# Patient Record
Sex: FEMALE | Race: WHITE | NOT HISPANIC OR LATINO | Employment: FULL TIME | URBAN - METROPOLITAN AREA
[De-identification: names, ages, dates, MRNs, and addresses within clinical notes are randomized per-mention and may not be internally consistent; named-entity substitution may affect disease eponyms.]

---

## 2017-04-06 ENCOUNTER — HOSPITAL ENCOUNTER (OUTPATIENT)
Dept: LAB | Facility: MEDICAL CENTER | Age: 42
End: 2017-04-06
Attending: OBSTETRICS & GYNECOLOGY
Payer: COMMERCIAL

## 2017-04-06 LAB
25(OH)D3 SERPL-MCNC: 16 NG/ML (ref 30–100)
ALBUMIN SERPL BCP-MCNC: 4.5 G/DL (ref 3.2–4.9)
ALBUMIN/GLOB SERPL: 1.4 G/DL
ALP SERPL-CCNC: 82 U/L (ref 30–99)
ALT SERPL-CCNC: 22 U/L (ref 2–50)
ANION GAP SERPL CALC-SCNC: 5 MMOL/L (ref 0–11.9)
AST SERPL-CCNC: 21 U/L (ref 12–45)
BASOPHILS # BLD AUTO: 0.8 % (ref 0–1.8)
BASOPHILS # BLD: 0.05 K/UL (ref 0–0.12)
BILIRUB SERPL-MCNC: 0.4 MG/DL (ref 0.1–1.5)
BUN SERPL-MCNC: 24 MG/DL (ref 8–22)
CALCIUM SERPL-MCNC: 9.7 MG/DL (ref 8.5–10.5)
CHLORIDE SERPL-SCNC: 107 MMOL/L (ref 96–112)
CHOLEST SERPL-MCNC: 227 MG/DL (ref 100–199)
CO2 SERPL-SCNC: 27 MMOL/L (ref 20–33)
CREAT SERPL-MCNC: 0.9 MG/DL (ref 0.5–1.4)
EOSINOPHIL # BLD AUTO: 0.23 K/UL (ref 0–0.51)
EOSINOPHIL NFR BLD: 3.6 % (ref 0–6.9)
ERYTHROCYTE [DISTWIDTH] IN BLOOD BY AUTOMATED COUNT: 43.7 FL (ref 35.9–50)
ESTRADIOL SERPL-MCNC: 23 PG/ML
FSH SERPL-ACNC: 35.5 MIU/ML
GFR SERPL CREATININE-BSD FRML MDRD: >60 ML/MIN/1.73 M 2
GLOBULIN SER CALC-MCNC: 3.3 G/DL (ref 1.9–3.5)
GLUCOSE SERPL-MCNC: 100 MG/DL (ref 65–99)
HCT VFR BLD AUTO: 47.2 % (ref 37–47)
HDLC SERPL-MCNC: 57 MG/DL
HGB BLD-MCNC: 15.1 G/DL (ref 12–16)
IMM GRANULOCYTES # BLD AUTO: 0.03 K/UL (ref 0–0.11)
IMM GRANULOCYTES NFR BLD AUTO: 0.5 % (ref 0–0.9)
LDLC SERPL CALC-MCNC: 148 MG/DL
LYMPHOCYTES # BLD AUTO: 2.08 K/UL (ref 1–4.8)
LYMPHOCYTES NFR BLD: 32.8 % (ref 22–41)
MCH RBC QN AUTO: 30.6 PG (ref 27–33)
MCHC RBC AUTO-ENTMCNC: 32 G/DL (ref 33.6–35)
MCV RBC AUTO: 95.5 FL (ref 81.4–97.8)
MONOCYTES # BLD AUTO: 0.53 K/UL (ref 0–0.85)
MONOCYTES NFR BLD AUTO: 8.3 % (ref 0–13.4)
NEUTROPHILS # BLD AUTO: 3.43 K/UL (ref 2–7.15)
NEUTROPHILS NFR BLD: 54 % (ref 44–72)
NRBC # BLD AUTO: 0 K/UL
NRBC BLD AUTO-RTO: 0 /100 WBC
PLATELET # BLD AUTO: 256 K/UL (ref 164–446)
PMV BLD AUTO: 12.1 FL (ref 9–12.9)
POTASSIUM SERPL-SCNC: 4.4 MMOL/L (ref 3.6–5.5)
PROT SERPL-MCNC: 7.8 G/DL (ref 6–8.2)
RBC # BLD AUTO: 4.94 M/UL (ref 4.2–5.4)
SODIUM SERPL-SCNC: 139 MMOL/L (ref 135–145)
T4 FREE SERPL-MCNC: 0.73 NG/DL (ref 0.53–1.43)
TRIGL SERPL-MCNC: 111 MG/DL (ref 0–149)
TSH SERPL DL<=0.005 MIU/L-ACNC: 1.61 UIU/ML (ref 0.3–3.7)
WBC # BLD AUTO: 6.4 K/UL (ref 4.8–10.8)

## 2017-04-06 PROCEDURE — 36415 COLL VENOUS BLD VENIPUNCTURE: CPT

## 2017-04-06 PROCEDURE — 82306 VITAMIN D 25 HYDROXY: CPT

## 2017-04-06 PROCEDURE — 83001 ASSAY OF GONADOTROPIN (FSH): CPT

## 2017-04-06 PROCEDURE — 80061 LIPID PANEL: CPT

## 2017-04-06 PROCEDURE — 84443 ASSAY THYROID STIM HORMONE: CPT

## 2017-04-06 PROCEDURE — 82670 ASSAY OF TOTAL ESTRADIOL: CPT

## 2017-04-06 PROCEDURE — 84439 ASSAY OF FREE THYROXINE: CPT

## 2017-04-06 PROCEDURE — 80053 COMPREHEN METABOLIC PANEL: CPT

## 2017-04-06 PROCEDURE — 85025 COMPLETE CBC W/AUTO DIFF WBC: CPT

## 2017-04-20 DIAGNOSIS — Z01.812 PRE-PROCEDURAL LABORATORY EXAMINATION: ICD-10-CM

## 2017-04-20 LAB
APPEARANCE UR: CLEAR
BILIRUB UR QL STRIP.AUTO: NEGATIVE
COLOR UR: YELLOW
CULTURE IF INDICATED INDCX: NO UA CULTURE
GLUCOSE UR STRIP.AUTO-MCNC: NEGATIVE MG/DL
HCG SERPL QL: NEGATIVE
KETONES UR STRIP.AUTO-MCNC: NEGATIVE MG/DL
LEUKOCYTE ESTERASE UR QL STRIP.AUTO: NEGATIVE
MICRO URNS: NORMAL
NITRITE UR QL STRIP.AUTO: NEGATIVE
PH UR STRIP.AUTO: 5.5 [PH]
PROT UR QL STRIP: NEGATIVE MG/DL
RBC UR QL AUTO: NEGATIVE
SP GR UR STRIP.AUTO: 1.02

## 2017-04-20 PROCEDURE — 36415 COLL VENOUS BLD VENIPUNCTURE: CPT

## 2017-04-20 PROCEDURE — 84703 CHORIONIC GONADOTROPIN ASSAY: CPT

## 2017-04-20 PROCEDURE — 81003 URINALYSIS AUTO W/O SCOPE: CPT

## 2017-04-20 RX ORDER — ACETAMINOPHEN 500 MG
500-1000 TABLET ORAL EVERY 6 HOURS PRN
Status: ON HOLD | COMMUNITY
End: 2017-04-27

## 2017-04-27 ENCOUNTER — HOSPITAL ENCOUNTER (OUTPATIENT)
Facility: MEDICAL CENTER | Age: 42
End: 2017-04-27
Attending: OBSTETRICS & GYNECOLOGY | Admitting: OBSTETRICS & GYNECOLOGY
Payer: COMMERCIAL

## 2017-04-27 VITALS
RESPIRATION RATE: 16 BRPM | SYSTOLIC BLOOD PRESSURE: 138 MMHG | DIASTOLIC BLOOD PRESSURE: 76 MMHG | BODY MASS INDEX: 37.84 KG/M2 | TEMPERATURE: 98.3 F | HEART RATE: 49 BPM | HEIGHT: 66 IN | OXYGEN SATURATION: 93 % | WEIGHT: 235.45 LBS

## 2017-04-27 PROBLEM — N93.8 DYSFUNCTIONAL UTERINE BLEEDING: Status: ACTIVE | Noted: 2017-04-27

## 2017-04-27 PROBLEM — N93.8 DYSFUNCTIONAL UTERINE BLEEDING: Status: RESOLVED | Noted: 2017-04-27 | Resolved: 2017-04-27

## 2017-04-27 LAB — HCG SERPL QL: NEGATIVE

## 2017-04-27 PROCEDURE — 110371 HCHG SHELL REV 272: Performed by: OBSTETRICS & GYNECOLOGY

## 2017-04-27 PROCEDURE — 160029 HCHG SURGERY MINUTES - 1ST 30 MINS LEVEL 4: Performed by: OBSTETRICS & GYNECOLOGY

## 2017-04-27 PROCEDURE — 700102 HCHG RX REV CODE 250 W/ 637 OVERRIDE(OP)

## 2017-04-27 PROCEDURE — A9270 NON-COVERED ITEM OR SERVICE: HCPCS

## 2017-04-27 PROCEDURE — 160009 HCHG ANES TIME/MIN: Performed by: OBSTETRICS & GYNECOLOGY

## 2017-04-27 PROCEDURE — 84703 CHORIONIC GONADOTROPIN ASSAY: CPT

## 2017-04-27 PROCEDURE — A4606 OXYGEN PROBE USED W OXIMETER: HCPCS | Performed by: OBSTETRICS & GYNECOLOGY

## 2017-04-27 PROCEDURE — 160036 HCHG PACU - EA ADDL 30 MINS PHASE I: Performed by: OBSTETRICS & GYNECOLOGY

## 2017-04-27 PROCEDURE — 700111 HCHG RX REV CODE 636 W/ 250 OVERRIDE (IP)

## 2017-04-27 PROCEDURE — 160048 HCHG OR STATISTICAL LEVEL 1-5: Performed by: OBSTETRICS & GYNECOLOGY

## 2017-04-27 PROCEDURE — 160041 HCHG SURGERY MINUTES - EA ADDL 1 MIN LEVEL 4: Performed by: OBSTETRICS & GYNECOLOGY

## 2017-04-27 PROCEDURE — 160002 HCHG RECOVERY MINUTES (STAT): Performed by: OBSTETRICS & GYNECOLOGY

## 2017-04-27 PROCEDURE — 700101 HCHG RX REV CODE 250

## 2017-04-27 PROCEDURE — 110382 HCHG SHELL REV 271: Performed by: OBSTETRICS & GYNECOLOGY

## 2017-04-27 PROCEDURE — 160035 HCHG PACU - 1ST 60 MINS PHASE I: Performed by: OBSTETRICS & GYNECOLOGY

## 2017-04-27 PROCEDURE — 502560 HCHG KIT, NOVASURE ENDOMETRIAL: Performed by: OBSTETRICS & GYNECOLOGY

## 2017-04-27 PROCEDURE — 502587 HCHG PACK, D&C: Performed by: OBSTETRICS & GYNECOLOGY

## 2017-04-27 PROCEDURE — 502240 HCHG MISC OR SUPPLY RC 0272: Performed by: OBSTETRICS & GYNECOLOGY

## 2017-04-27 RX ORDER — SODIUM CHLORIDE, SODIUM LACTATE, POTASSIUM CHLORIDE, CALCIUM CHLORIDE 600; 310; 30; 20 MG/100ML; MG/100ML; MG/100ML; MG/100ML
INJECTION, SOLUTION INTRAVENOUS ONCE
Status: COMPLETED | OUTPATIENT
Start: 2017-04-27 | End: 2017-04-27

## 2017-04-27 RX ORDER — MIDAZOLAM HYDROCHLORIDE 1 MG/ML
INJECTION INTRAMUSCULAR; INTRAVENOUS
Status: DISCONTINUED
Start: 2017-04-27 | End: 2017-04-27 | Stop reason: HOSPADM

## 2017-04-27 RX ORDER — OXYCODONE HCL 5 MG/5 ML
SOLUTION, ORAL ORAL
Status: COMPLETED
Start: 2017-04-27 | End: 2017-04-27

## 2017-04-27 RX ORDER — OXYCODONE HYDROCHLORIDE 5 MG/1
2.5 TABLET ORAL
Status: DISCONTINUED | OUTPATIENT
Start: 2017-04-27 | End: 2017-04-27 | Stop reason: HOSPADM

## 2017-04-27 RX ORDER — KETOROLAC TROMETHAMINE 30 MG/ML
30 INJECTION, SOLUTION INTRAMUSCULAR; INTRAVENOUS EVERY 6 HOURS
Status: DISCONTINUED | OUTPATIENT
Start: 2017-04-27 | End: 2017-04-27 | Stop reason: HOSPADM

## 2017-04-27 RX ORDER — NORETHINDRONE ACETATE AND ETHINYL ESTRADIOL, AND FERROUS FUMARATE 1MG-20(24)
1 KIT ORAL DAILY
COMMUNITY
End: 2020-01-20

## 2017-04-27 RX ORDER — BUPIVACAINE HYDROCHLORIDE AND EPINEPHRINE 2.5; 5 MG/ML; UG/ML
INJECTION, SOLUTION INFILTRATION; PERINEURAL
Status: DISCONTINUED | OUTPATIENT
Start: 2017-04-27 | End: 2017-04-27 | Stop reason: HOSPADM

## 2017-04-27 RX ORDER — MEPERIDINE HYDROCHLORIDE 25 MG/ML
INJECTION INTRAMUSCULAR; INTRAVENOUS; SUBCUTANEOUS
Status: COMPLETED
Start: 2017-04-27 | End: 2017-04-27

## 2017-04-27 RX ORDER — ACETAMINOPHEN 500 MG
1000 TABLET ORAL EVERY 6 HOURS
Status: DISCONTINUED | OUTPATIENT
Start: 2017-04-27 | End: 2017-04-27 | Stop reason: HOSPADM

## 2017-04-27 RX ORDER — ONDANSETRON 2 MG/ML
4 INJECTION INTRAMUSCULAR; INTRAVENOUS EVERY 6 HOURS PRN
Status: DISCONTINUED | OUTPATIENT
Start: 2017-04-27 | End: 2017-04-27 | Stop reason: HOSPADM

## 2017-04-27 RX ORDER — OXYCODONE HYDROCHLORIDE 5 MG/1
5 TABLET ORAL
Qty: 15 TAB | Refills: 0 | Status: SHIPPED | OUTPATIENT
Start: 2017-04-27 | End: 2019-07-09

## 2017-04-27 RX ADMIN — FENTANYL CITRATE 25 MCG: 50 INJECTION, SOLUTION INTRAMUSCULAR; INTRAVENOUS at 11:21

## 2017-04-27 RX ADMIN — FENTANYL CITRATE 25 MCG: 50 INJECTION, SOLUTION INTRAMUSCULAR; INTRAVENOUS at 11:37

## 2017-04-27 RX ADMIN — MEPERIDINE HYDROCHLORIDE 12.5 MG: 25 INJECTION INTRAMUSCULAR; INTRAVENOUS; SUBCUTANEOUS at 10:55

## 2017-04-27 RX ADMIN — OXYCODONE HYDROCHLORIDE 10 MG: 5 SOLUTION ORAL at 11:10

## 2017-04-27 RX ADMIN — SODIUM CHLORIDE, SODIUM LACTATE, POTASSIUM CHLORIDE, CALCIUM CHLORIDE: 600; 310; 30; 20 INJECTION, SOLUTION INTRAVENOUS at 08:45

## 2017-04-27 ASSESSMENT — PAIN SCALES - GENERAL
PAINLEVEL_OUTOF10: 5
PAINLEVEL_OUTOF10: 4
PAINLEVEL_OUTOF10: 6
PAINLEVEL_OUTOF10: 4

## 2017-04-27 NOTE — DISCHARGE INSTRUCTIONS
ACTIVITY: Rest and take it easy for the first 24 hours.  A responsible adult is recommended to remain with you during that time.  It is normal to feel sleepy.  We encourage you to not do anything that requires balance, judgment or coordination.    MILD FLU-LIKE SYMPTOMS ARE NORMAL. YOU MAY EXPERIENCE GENERALIZED MUSCLE ACHES, THROAT IRRITATION, HEADACHE AND/OR SOME NAUSEA.    FOR 24 HOURS DO NOT:  Drive, operate machinery or run household appliances.  Drink beer or alcoholic beverages.   Make important decisions or sign legal documents.    SPECIAL INSTRUCTIONS: See Attached Instruction Sheet Regarding Hysteroscopy    F/up for a wound check in 2 weeks    Pelvic rest and no heavy lifting more than 20 lb for 6 weeks    Call for heavy bleeding or evidence of wound infection       DIET: To avoid nausea, slowly advance diet as tolerated, avoiding spicy or greasy foods for the first day.  Add more substantial food to your diet according to your physician's instructions.  Babies can be fed formula or breast milk as soon as they are hungry.  INCREASE FLUIDS AND FIBER TO AVOID CONSTIPATION.    SURGICAL DRESSING/BATHING: May shower tomorrow, no hot tubs, baths or swimming until approved by you physician     FOLLOW-UP APPOINTMENT:  A follow-up appointment should be arranged with your doctor, call to schedule.    You should CALL YOUR PHYSICIAN if you develop:  Fever greater than 101 degrees F.  Pain not relieved by medication, or persistent nausea or vomiting.  Excessive bleeding (blood soaking through dressing) or unexpected drainage from the wound.  Extreme redness or swelling around the incision site, drainage of pus or foul smelling drainage.  Inability to urinate or empty your bladder within 8 hours.  Problems with breathing or chest pain.    You should call 911 if you develop problems with breathing or chest pain.  If you are unable to contact your doctor or surgical center, you should go to the nearest emergency room or  urgent care center.  Physician's telephone #: Dr. Webb [phone]    If any questions arise, call your doctor.  If your doctor is not available, please feel free to call the Surgical Center at [phone].  The Center is open Monday through Friday from 7AM to 7PM.  You can also call the HEALTH HOTLINE open 24 hours/day, 7 days/week and speak to a nurse at [phone], or toll free at [phone].    A registered nurse may call you a few days after your surgery to see how you are doing after your procedure.    MEDICATIONS: Resume taking daily medication.  Take prescribed pain medication with food.  If no medication is prescribed, you may take non-aspirin pain medication if needed.  PAIN MEDICATION CAN BE VERY CONSTIPATING.  Take a stool softener or laxative such as senokot, pericolace, or milk of magnesia if needed.    Prescription given for Roxicodone.  Last pain medication given at 11:10 AM, next dose due at 3:10 PM.    If your physician has prescribed pain medication that includes Acetaminophen (Tylenol), do not take additional Acetaminophen (Tylenol) while taking the prescribed medication.    Depression / Suicide Risk    As you are discharged from this [facility] facility, it is important to learn how to keep safe from harming yourself.    Recognize the warning signs:  · Abrupt changes in personality, positive or negative- including increase in energy   · Giving away possessions  · Change in eating patterns- significant weight changes-  positive or negative  · Change in sleeping patterns- unable to sleep or sleeping all the time   · Unwillingness or inability to communicate  · Depression  · Unusual sadness, discouragement and loneliness  · Talk of wanting to die  · Neglect of personal appearance   · Rebelliousness- reckless behavior  · Withdrawal from people/activities they love  · Confusion- inability to concentrate     If you or a loved one observes any of these behaviors or has concerns about  self-harm, here's what you can do:  · Talk about it- your feelings and reasons for harming yourself  · Remove any means that you might use to hurt yourself (examples: pills, rope, extension cords, firearm)  · Get professional help from the community (Mental Health, Substance Abuse, psychological counseling)  · Do not be alone:Call your Safe Contact- someone whom you trust who will be there for you.  · Call your local CRISIS HOTLINE 428-7575 or 432-677-8345  · Call your local Children's Mobile Crisis Response Team [place] (426) 683-7735 or www.[employer]  · Call the toll free National Suicide Prevention Hotlines   · National Suicide Prevention Lifeline 142-990-YJHO (3638)  · National Hope Line Network 800-SUICIDE (845-5602)

## 2017-04-27 NOTE — H&P
CHIEF COMPLAINT:  Dysmenorrhea and menorrhagia.    HISTORY OF PRESENT ILLNESS:  Patient is a 42-year-old parous female.  She has   had one term  section years ago, but has been seeking infertility with   Dr. Rosario for years now.  She has chosen to not pursue her infertility anymore.    She has had history of bowel resection for small bowel obstruction with   lysis of adhesions, appendectomy, multiple laparoscopic surgeries, as well as   hysteroscopies to no avail.  She decides to pursue ablation of her uterus due   to her heavy bleeding and her periods.  Plan to proceed with a hysteroscopy,   NovaSure uterine ablation today.    PAST MEDICAL HISTORY AND PAST SURGICAL HISTORY:  The patient's past medical   history is significant for the above.  She has had a history of chlamydia in    and tubo-ovarian abscess.  She also had a history of an abnormal Pap   smear , had cryotherapy.  She had a small bowel resection for small-bowel   obstruction, and exploratory laparotomy, appendectomy with lysis of adhesions,   and several laparoscopies for adhesions.  She had a Harleen-Parkinson-White   syndrome that resolved.  She had a  in .  She has also had more   recently a hysteroscopy by Dr. Rosario in .  She has had gallbladder surgery,   as well as a colonoscopy in .  Past medical history is significant for the   history of abnormal Pap smear, irregular bleeding, and history of chlamydia.    She also has reflux and GERD, and borderline hypertension.    OBSTETRICAL HISTORY:  She has had two pregnancies, 1 SAB and then one-term    section.    ALLERGIES:  SHE HAS AN ALLERGY TO LATEX AND SULFA.    CURRENT MEDICATIONS:  Prilosec, Pepcid-AC, Claritin as needed, and   multivitamin.    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    FAMILY HISTORY:  Significant for ovarian cancer in her mother.    PHYSICAL EXAMINATION:  VITAL SIGNS:  On physical examination, her  vital signs are stable.  LUNGS:  Clear.  CORONARY:  Regular rate.  ABDOMEN:  Obese, multiple well-healed incisions.  No organosplenomegaly, no   rebound, guarding, or tenderness.  Bimanual examination shows extremely well   supported uterus, nulliparous appearing cervix, parous appearing uterus,   normal, nontender adnexa.  Patient had transvaginal ultrasound at                        within the last year, which was also benign.    IMPRESSION AND PLAN:  My impression patient has dysmenorrhea and menorrhagia.    Currently, she is on Ultram low-dose birth control pill to manage her   bleeding where she was recommended to continue to remain on that until her   surgery and then she will go off.  Patient understands the risks of the   procedure, which include, but are not limited to infection, bleeding,   transfusion, transfusion-related complications, risks of intraabdominal organ   injury, injury to bowel, bladder, need for repair, risk of continued bleeding   and cramping, and need for further therapy in the future.  Also, risk of need   for more definitive therapy in the future since it is a hysterectomy and   oophorectomy.  Patient understands the risks of uterine perforation, which is   unique to this procedure and risk of injury to bowel or bladder and need for   reparative surgery, as well as potential failure of the procedure, as well as   risks of general anesthesia including death.  All her questions were answered   and informed consent was obtained.  She was administered a dose of Cytotec the   night before to help with cervical dilation.  She will remain on her birth   control pills to thin the uterine lining until her procedure then she will go   off.  She is aware that this is not a form of contraception and that she   continues to use condoms as pregnancies after an ablation do not typically do   well.       ____________________________________     MD RAUL BOLIVAR / AKANKSHA    DD:  04/27/2017  07:25:24  DT:  04/27/2017 08:05:17    D#:  [id number]  Job#:  [id number]

## 2017-04-27 NOTE — IP AVS SNAPSHOT
" Home Care Instructions                                                                                                                Name:Balbina Randall  Medical Record Number:[id number]  CSN: [id number]    YOB: 1975   Age: 42 y.o.  Sex: female  HT:1.676 m (5' 5.98\") WT: 106.8 kg (235 lb 7.2 oz)          Admit Date: 4/27/2017     Discharge Date:   Today's Date: 4/27/2017  Attending Doctor:  Lindsay Webb M.D.                  Allergies:  Latex; Other drug; and Sulfa drugs                Discharge Instructions         ACTIVITY: Rest and take it easy for the first 24 hours.  A responsible adult is recommended to remain with you during that time.  It is normal to feel sleepy.  We encourage you to not do anything that requires balance, judgment or coordination.    MILD FLU-LIKE SYMPTOMS ARE NORMAL. YOU MAY EXPERIENCE GENERALIZED MUSCLE ACHES, THROAT IRRITATION, HEADACHE AND/OR SOME NAUSEA.    FOR 24 HOURS DO NOT:  Drive, operate machinery or run household appliances.  Drink beer or alcoholic beverages.   Make important decisions or sign legal documents.    SPECIAL INSTRUCTIONS: See Attached Instruction Sheet Regarding Hysteroscopy    F/up for a wound check in 2 weeks    Pelvic rest and no heavy lifting more than 20 lb for 6 weeks    Call for heavy bleeding or evidence of wound infection       DIET: To avoid nausea, slowly advance diet as tolerated, avoiding spicy or greasy foods for the first day.  Add more substantial food to your diet according to your physician's instructions.  Babies can be fed formula or breast milk as soon as they are hungry.  INCREASE FLUIDS AND FIBER TO AVOID CONSTIPATION.    SURGICAL DRESSING/BATHING: May shower tomorrow, no hot tubs, baths or swimming until approved by you physician     FOLLOW-UP APPOINTMENT:  A follow-up appointment should be arranged with your doctor, call to schedule.    You should CALL YOUR PHYSICIAN if you develop:  Fever greater than 101 degrees F.  Pain " not relieved by medication, or persistent nausea or vomiting.  Excessive bleeding (blood soaking through dressing) or unexpected drainage from the wound.  Extreme redness or swelling around the incision site, drainage of pus or foul smelling drainage.  Inability to urinate or empty your bladder within 8 hours.  Problems with breathing or chest pain.    You should call 911 if you develop problems with breathing or chest pain.  If you are unable to contact your doctor or surgical center, you should go to the nearest emergency room or urgent care center.  Physician's telephone #: Dr. Webb [phone]    If any questions arise, call your doctor.  If your doctor is not available, please feel free to call the Surgical Center at [phone].  The Center is open Monday through Friday from 7AM to 7PM.  You can also call the [facility] HOTLINE open 24 hours/day, 7 days/week and speak to a nurse at [phone], or toll free at [phone].    A registered nurse may call you a few days after your surgery to see how you are doing after your procedure.    MEDICATIONS: Resume taking daily medication.  Take prescribed pain medication with food.  If no medication is prescribed, you may take non-aspirin pain medication if needed.  PAIN MEDICATION CAN BE VERY CONSTIPATING.  Take a stool softener or laxative such as senokot, pericolace, or milk of magnesia if needed.    Prescription given for Roxicodone.  Last pain medication given at 11:10 AM, next dose due at 3:10 PM.    If your physician has prescribed pain medication that includes Acetaminophen (Tylenol), do not take additional Acetaminophen (Tylenol) while taking the prescribed medication.    Depression / Suicide Risk    As you are discharged from this [facility] facility, it is important to learn how to keep safe from harming yourself.    Recognize the warning signs:  · Abrupt changes in personality, positive or negative- including increase in energy   · Giving away  possessions  · Change in eating patterns- significant weight changes-  positive or negative  · Change in sleeping patterns- unable to sleep or sleeping all the time   · Unwillingness or inability to communicate  · Depression  · Unusual sadness, discouragement and loneliness  · Talk of wanting to die  · Neglect of personal appearance   · Rebelliousness- reckless behavior  · Withdrawal from people/activities they love  · Confusion- inability to concentrate     If you or a loved one observes any of these behaviors or has concerns about self-harm, here's what you can do:  · Talk about it- your feelings and reasons for harming yourself  · Remove any means that you might use to hurt yourself (examples: pills, rope, extension cords, firearm)  · Get professional help from the community (Mental Health, Substance Abuse, psychological counseling)  · Do not be alone:Call your Safe Contact- someone whom you trust who will be there for you.  · Call your local CRISIS HOTLINE 871-0355 or 440-430-7166  · Call your local Children's Mobile Crisis Response Team [place] (250) 823-4027 or [employer]  · Call the toll free National Suicide Prevention Hotlines   · National Suicide Prevention Lifeline 016-299-HINJ (6021)  · National Hope Line Network 800-SUICIDE (630-4683)       Medication List      START taking these medications        Instructions    Morning Afternoon Evening Bedtime    oxycodone immediate-release 5 MG Tabs   Commonly known as:  ROXICODONE        Take 1 Tab by mouth every 3 hours as needed (Moderate Pain (NRS Pain Scale 4-6; CPOT Pain Scale 3-5)).   Dose:  5 mg                          CONTINUE taking these medications        Instructions    Morning Afternoon Evening Bedtime    omeprazole 20 MG delayed-release capsule   Commonly known as:  PRILOSEC        Take 1 Cap by mouth 2 Times a Day.   Dose:  20 mg                        TAYTULLA 1-20 MG-MCG(24) Caps   Generic drug:  Norethin Ace-Eth Estrad-FE         Take 1 Tab by mouth every day.   Dose:  1 Tab                          STOP taking these medications     acetaminophen 500 MG Tabs   Commonly known as:  TYLENOL               NON SPECIFIED                    Where to Get Your Medications      Information about where to get these medications is not yet available     ! Ask your nurse or doctor about these medications    - oxycodone immediate-release 5 MG Tabs            Medication Information     Above and/or attached are the medications your physician expects you to take upon discharge. Review all of your home medications and newly ordered medications with your doctor and/or pharmacist. Follow medication instructions as directed by your doctor and/or pharmacist. Please keep your medication list with you and share with your physician. Update the information when medications are discontinued, doses are changed, or new medications (including over-the-counter products) are added; and carry medication information at all times in the event of emergency situations.        Resources     Quit Smoking / Tobacco Use:    I understand the use of any tobacco products increases my chance of suffering from future heart disease or stroke and could cause other illnesses which may shorten my life. Quitting the use of tobacco products is the single most important thing I can do to improve my health. For further information on smoking / tobacco cessation call a Toll Free Quit Line at 1-193.613.3863 (*National Cancer [place]) or 1-894.529.3081 (American Lung Association) or you can access the web based program at www.lungusa.org.    [place] Tobacco Users Help Line:  (905) 805-2706       Toll Free: 1-453.970.7128  Quit Tobacco Program [facility] (164)699-4129    Crisis Hotline:    [place] Crisis Hotline:  2-589-HYIEZKG or 1-118.685.9657    [place] Crisis Hotline:    1-941.562.7401 or 996-788-4126    Discharge Survey:   Thank you for choosing [facility]. We hope  we did everything we could to make your hospital stay a pleasant one. You may be receiving a survey and we would appreciate your time and participation in answering the questions. Your input is very valuable to us in our efforts to improve our service to our patients and their families.            Signatures     My signature on this form indicates that:    1. I acknowledge receipt and understanding of these Home Care Instruction.  2. My questions regarding this information have been answered to my satisfaction.  3. I have formulated a plan with my discharge nurse to obtain my prescribed medications for home.    __________________________________      __________________________________                   Patient Signature                                 Guardian/Responsible Adult Signature      __________________________________                 __________       ________                       Nurse Signature                                               Date                 Time

## 2017-04-27 NOTE — IP AVS SNAPSHOT
4/27/2017    Balbina Randall  [address] [facility] [address]    Dear Balbina:    [facility] wants to ensure your discharge home is safe and you or your loved ones have had all of your questions answered regarding your care after you leave the hospital.    Below is a list of resources and contact information should you have any questions regarding your hospital stay, follow-up instructions, or active medical symptoms.    Questions or Concerns Regarding… Contact   Medical Questions Related to Your Discharge  (7 days a week, 8am-5pm) Contact a Nurse Care Coordinator   [phone]   Medical Questions Not Related to Your Discharge  (24 hours a day / 7 days a week)  Contact the Nurse Health Line   [phone]    Medications or Discharge Instructions Refer to your discharge packet   or contact your [facility] Primary Care Provider   [phone]   Follow-up Appointment(s) Schedule your appointment via [employer]   or contact Scheduling [phone]   Billing Review your statement via [employer]  or contact Billing [phone]   Medical Records Review your records via [employer]   or contact Medical Records [phone]     You may receive a telephone call within two days of discharge. This call is to make certain you understand your discharge instructions and have the opportunity to have any questions answered. You can also easily access your medical information, test results and upcoming appointments via the [employer]sense free online health management tool. You can learn more and sign up at [employer]/Healthsense. For assistance setting up your Healthsense account, please call [phone].    Once again, we want to ensure your discharge home is safe and that you have a clear understanding of any next steps in your care. If you have any questions or concerns, please do not hesitate to contact us, we are here for you. Thank you for choosing [facility] for your healthcare needs.    Sincerely,    Your [facility] Healthcare Team

## 2017-04-27 NOTE — OR NURSING
1050 Received from Prasad NICHOLAS.  Abdomen soft, no bleeding noted on traci pad.      1055 Pt is shivering see mar.     1110 Oral pain medication given see mar     1121 Iv pain medication given see mar  6/10    1122 Pt  brought to bedside.  Pt wants to fill RX on her way home.      1242 Pt voided without difficulty.  Steady gait, no c/o of n/v.      1245 Dc instructions completed with PT and her .     1254 Dc to car via ambulating.  Pt has all belongings.

## 2017-05-03 NOTE — OP REPORT
DATE OF SERVICE:  2017    PREOPERATIVE DIAGNOSES:  1.  Menorrhagia.  2.  Dysmenorrhea.  3.  History of prior  section.  4.  History of endometriosis and adhesions.  5.  History of infertility.    POSTOPERATIVE DIAGNOSES:  1.  Menorrhagia.  2.  Dysmenorrhea.  3.  History of prior  section.  4.  History of endometriosis and adhesions.  5.  History of infertility.    OPERATIONS AND PROCEDURES PERFORMED:  Hysteroscopy and NovaSure uterine   ablation.    SURGEON:  Lindsay Webb MD    ASSISTANT:  None.    ANESTHESIOLOGIST:  Himanshu Mortensen DO    COMPLICATIONS:  None.    ESTIMATED BLOOD LOSS:  Less than 10 mL.    FINDINGS:  Atrophic uterine lining, normal bilateral ostia.    PROCEDURE PERFORMED IN DETAIL:  Patient is a 42-year-old parous female with a   longstanding history of infertility due to endometriosis and pelvic   inflammatory disease resulting in scar tissue who has decided to not pursue   fertility anymore and wishes to undergo a uterine ablation due to dysmenorrhea   and menorrhagia.  Patient understands this is not a form of contraception and   plans to use condoms.  She has tried hormonal therapy, but does not tolerate   it well and gets a rash.  She also has a family history for ovarian cancer.    Plan is to proceed with a NovaSure uterine ablation and hysteroscopy.    Risks, benefits, indications, and alternatives were discussed with the patient   prior, which include, but are not limited to, infection, bleeding,   transfusion, transfusion-related complications, risk of pregnancy if she does   not use any form of contraception and the pregnancy not ending well, in   addition to risk of uterine perforation, injury to bowel, bladder, as well as   failure of the procedure and need for definitive therapy.  All questions were   answered, and informed consent was obtained.    The patient was taken to the operating room with a running IV.  General   anesthesia was administered.  She was  prepped and draped in the usual sterile   fashion.  She received 2 g of Ancef on-call to the OR.  A bivalve speculum was   placed in the vagina.  Cervix was circumferentially injected with 0.25%   Marcaine.  The uterus was sounded to about 6 cm.  The hysteroscope was then   introduced after the cervix was dilated to admit a size 7 Hegar dilator.    Survey of the patient's pelvis revealed atrophic findings ___ lining.  The   hysteroscope was then removed, and the NovaSure device was placed.  The length   was set at 6 cm with a width 4.3 cm, 88 power noted.  It lasted for about 56   seconds ___ passed the cavity inspection.  The instrument was removed.    Excellent hemostasis was noted.  Tenaculum was removed.  The patient went to   recovery room in stable condition.    PATHOLOGY:  None.       ____________________________________     MD RAUL BOLIVAR / AKANKSHA    DD:  05/02/2017 08:32:49  DT:  05/02/2017 10:50:30    D#:  [id number]  Job#:  [id number]

## 2017-06-15 ENCOUNTER — HOSPITAL ENCOUNTER (OUTPATIENT)
Dept: LAB | Facility: MEDICAL CENTER | Age: 42
End: 2017-06-15
Attending: PHYSICIAN ASSISTANT
Payer: COMMERCIAL

## 2017-06-15 LAB
ALBUMIN SERPL BCP-MCNC: 3.8 G/DL (ref 3.2–4.9)
ALBUMIN/GLOB SERPL: 1 G/DL
ALP SERPL-CCNC: 84 U/L (ref 30–99)
ALT SERPL-CCNC: 17 U/L (ref 2–50)
ANION GAP SERPL CALC-SCNC: 7 MMOL/L (ref 0–11.9)
AST SERPL-CCNC: 18 U/L (ref 12–45)
BASOPHILS # BLD AUTO: 1 % (ref 0–1.8)
BASOPHILS # BLD: 0.09 K/UL (ref 0–0.12)
BILIRUB SERPL-MCNC: 0.4 MG/DL (ref 0.1–1.5)
BUN SERPL-MCNC: 15 MG/DL (ref 8–22)
CALCIUM SERPL-MCNC: 9.3 MG/DL (ref 8.5–10.5)
CHLORIDE SERPL-SCNC: 106 MMOL/L (ref 96–112)
CO2 SERPL-SCNC: 25 MMOL/L (ref 20–33)
CREAT SERPL-MCNC: 0.82 MG/DL (ref 0.5–1.4)
EOSINOPHIL # BLD AUTO: 0.33 K/UL (ref 0–0.51)
EOSINOPHIL NFR BLD: 3.6 % (ref 0–6.9)
ERYTHROCYTE [DISTWIDTH] IN BLOOD BY AUTOMATED COUNT: 41.3 FL (ref 35.9–50)
GFR SERPL CREATININE-BSD FRML MDRD: >60 ML/MIN/1.73 M 2
GLOBULIN SER CALC-MCNC: 3.7 G/DL (ref 1.9–3.5)
GLUCOSE SERPL-MCNC: 97 MG/DL (ref 65–99)
HCT VFR BLD AUTO: 45.6 % (ref 37–47)
HGB BLD-MCNC: 14.9 G/DL (ref 12–16)
IMM GRANULOCYTES # BLD AUTO: 0.05 K/UL (ref 0–0.11)
IMM GRANULOCYTES NFR BLD AUTO: 0.5 % (ref 0–0.9)
LIPASE SERPL-CCNC: 20 U/L (ref 11–82)
LYMPHOCYTES # BLD AUTO: 2.27 K/UL (ref 1–4.8)
LYMPHOCYTES NFR BLD: 24.9 % (ref 22–41)
MCH RBC QN AUTO: 30.8 PG (ref 27–33)
MCHC RBC AUTO-ENTMCNC: 32.7 G/DL (ref 33.6–35)
MCV RBC AUTO: 94.2 FL (ref 81.4–97.8)
MONOCYTES # BLD AUTO: 0.66 K/UL (ref 0–0.85)
MONOCYTES NFR BLD AUTO: 7.2 % (ref 0–13.4)
NEUTROPHILS # BLD AUTO: 5.73 K/UL (ref 2–7.15)
NEUTROPHILS NFR BLD: 62.8 % (ref 44–72)
NRBC # BLD AUTO: 0 K/UL
NRBC BLD AUTO-RTO: 0 /100 WBC
PLATELET # BLD AUTO: 263 K/UL (ref 164–446)
PMV BLD AUTO: 12.4 FL (ref 9–12.9)
POTASSIUM SERPL-SCNC: 4.1 MMOL/L (ref 3.6–5.5)
PROT SERPL-MCNC: 7.5 G/DL (ref 6–8.2)
RBC # BLD AUTO: 4.84 M/UL (ref 4.2–5.4)
SODIUM SERPL-SCNC: 138 MMOL/L (ref 135–145)
WBC # BLD AUTO: 9.1 K/UL (ref 4.8–10.8)

## 2017-06-15 PROCEDURE — 83690 ASSAY OF LIPASE: CPT

## 2017-06-15 PROCEDURE — 36415 COLL VENOUS BLD VENIPUNCTURE: CPT

## 2017-06-15 PROCEDURE — 85025 COMPLETE CBC W/AUTO DIFF WBC: CPT

## 2017-06-15 PROCEDURE — 80053 COMPREHEN METABOLIC PANEL: CPT

## 2017-09-28 ENCOUNTER — EH NON-PROVIDER (OUTPATIENT)
Dept: OCCUPATIONAL MEDICINE | Facility: CLINIC | Age: 42
End: 2017-09-28

## 2017-09-28 DIAGNOSIS — Z29.89 NEED FOR ISOLATION: ICD-10-CM

## 2017-09-28 PROCEDURE — 94375 RESPIRATORY FLOW VOLUME LOOP: CPT

## 2018-04-18 ENCOUNTER — HOSPITAL ENCOUNTER (OUTPATIENT)
Dept: LAB | Facility: MEDICAL CENTER | Age: 43
End: 2018-04-18
Attending: PHYSICIAN ASSISTANT
Payer: COMMERCIAL

## 2018-04-18 LAB
ALBUMIN SERPL BCP-MCNC: 4.4 G/DL (ref 3.2–4.9)
ALBUMIN/GLOB SERPL: 1.4 G/DL
ALP SERPL-CCNC: 73 U/L (ref 30–99)
ALT SERPL-CCNC: 40 U/L (ref 2–50)
ANION GAP SERPL CALC-SCNC: 11 MMOL/L (ref 0–11.9)
AST SERPL-CCNC: 36 U/L (ref 12–45)
BASOPHILS # BLD AUTO: 0.9 % (ref 0–1.8)
BASOPHILS # BLD: 0.07 K/UL (ref 0–0.12)
BILIRUB SERPL-MCNC: 0.5 MG/DL (ref 0.1–1.5)
BUN SERPL-MCNC: 16 MG/DL (ref 8–22)
CALCIUM SERPL-MCNC: 9.5 MG/DL (ref 8.5–10.5)
CHLORIDE SERPL-SCNC: 101 MMOL/L (ref 96–112)
CO2 SERPL-SCNC: 26 MMOL/L (ref 20–33)
CREAT SERPL-MCNC: 0.9 MG/DL (ref 0.5–1.4)
EOSINOPHIL # BLD AUTO: 0.28 K/UL (ref 0–0.51)
EOSINOPHIL NFR BLD: 3.4 % (ref 0–6.9)
ERYTHROCYTE [DISTWIDTH] IN BLOOD BY AUTOMATED COUNT: 43.6 FL (ref 35.9–50)
GLOBULIN SER CALC-MCNC: 3.1 G/DL (ref 1.9–3.5)
GLUCOSE SERPL-MCNC: 83 MG/DL (ref 65–99)
HCT VFR BLD AUTO: 43.4 % (ref 37–47)
HGB BLD-MCNC: 14.2 G/DL (ref 12–16)
IMM GRANULOCYTES # BLD AUTO: 0.03 K/UL (ref 0–0.11)
IMM GRANULOCYTES NFR BLD AUTO: 0.4 % (ref 0–0.9)
LIPASE SERPL-CCNC: 18 U/L (ref 11–82)
LYMPHOCYTES # BLD AUTO: 2.74 K/UL (ref 1–4.8)
LYMPHOCYTES NFR BLD: 33.5 % (ref 22–41)
MCH RBC QN AUTO: 30.3 PG (ref 27–33)
MCHC RBC AUTO-ENTMCNC: 32.7 G/DL (ref 33.6–35)
MCV RBC AUTO: 92.7 FL (ref 81.4–97.8)
MONOCYTES # BLD AUTO: 0.63 K/UL (ref 0–0.85)
MONOCYTES NFR BLD AUTO: 7.7 % (ref 0–13.4)
NEUTROPHILS # BLD AUTO: 4.43 K/UL (ref 2–7.15)
NEUTROPHILS NFR BLD: 54.1 % (ref 44–72)
NRBC # BLD AUTO: 0 K/UL
NRBC BLD-RTO: 0 /100 WBC
PLATELET # BLD AUTO: 249 K/UL (ref 164–446)
PMV BLD AUTO: 12.2 FL (ref 9–12.9)
POTASSIUM SERPL-SCNC: 3.9 MMOL/L (ref 3.6–5.5)
PROT SERPL-MCNC: 7.5 G/DL (ref 6–8.2)
RBC # BLD AUTO: 4.68 M/UL (ref 4.2–5.4)
SODIUM SERPL-SCNC: 138 MMOL/L (ref 135–145)
WBC # BLD AUTO: 8.2 K/UL (ref 4.8–10.8)

## 2018-04-18 PROCEDURE — 80053 COMPREHEN METABOLIC PANEL: CPT

## 2018-04-18 PROCEDURE — 85025 COMPLETE CBC W/AUTO DIFF WBC: CPT

## 2018-04-18 PROCEDURE — 36415 COLL VENOUS BLD VENIPUNCTURE: CPT

## 2018-04-18 PROCEDURE — 83690 ASSAY OF LIPASE: CPT

## 2018-05-02 ENCOUNTER — HOSPITAL ENCOUNTER (OUTPATIENT)
Dept: RADIOLOGY | Facility: MEDICAL CENTER | Age: 43
End: 2018-05-02
Attending: PHYSICIAN ASSISTANT
Payer: COMMERCIAL

## 2018-05-02 DIAGNOSIS — R68.81 EARLY SATIETY: ICD-10-CM

## 2018-05-02 DIAGNOSIS — R14.0 ABDOMINAL DISTENTION: ICD-10-CM

## 2018-05-02 DIAGNOSIS — R10.13 ABDOMINAL PAIN, EPIGASTRIC: ICD-10-CM

## 2018-05-02 PROCEDURE — 700117 HCHG RX CONTRAST REV CODE 255: Performed by: PHYSICIAN ASSISTANT

## 2018-05-02 PROCEDURE — 74170 CT ABD WO CNTRST FLWD CNTRST: CPT

## 2018-05-02 RX ADMIN — IOHEXOL 100 ML: 350 INJECTION, SOLUTION INTRAVENOUS at 08:01

## 2018-09-13 ENCOUNTER — DOCUMENTATION (OUTPATIENT)
Dept: OCCUPATIONAL MEDICINE | Facility: CLINIC | Age: 43
End: 2018-09-13

## 2018-09-20 ENCOUNTER — EH NON-PROVIDER (OUTPATIENT)
Dept: OCCUPATIONAL MEDICINE | Facility: CLINIC | Age: 43
End: 2018-09-20
Payer: COMMERCIAL

## 2018-09-20 DIAGNOSIS — Z02.89 ENCOUNTER FOR OCCUPATIONAL HEALTH EXAMINATION INVOLVING RESPIRATOR: Primary | ICD-10-CM

## 2018-09-20 PROCEDURE — 94375 RESPIRATORY FLOW VOLUME LOOP: CPT | Performed by: PREVENTIVE MEDICINE

## 2018-09-27 ENCOUNTER — IMMUNIZATION (OUTPATIENT)
Dept: OCCUPATIONAL MEDICINE | Facility: CLINIC | Age: 43
End: 2018-09-27

## 2018-09-27 DIAGNOSIS — Z23 NEED FOR VACCINATION: ICD-10-CM

## 2018-09-27 PROCEDURE — 90686 IIV4 VACC NO PRSV 0.5 ML IM: CPT | Performed by: PREVENTIVE MEDICINE

## 2019-01-14 ENCOUNTER — HOSPITAL ENCOUNTER (OUTPATIENT)
Dept: LAB | Facility: MEDICAL CENTER | Age: 44
End: 2019-01-14
Attending: OBSTETRICS & GYNECOLOGY
Payer: COMMERCIAL

## 2019-01-14 LAB
25(OH)D3 SERPL-MCNC: 12 NG/ML (ref 30–100)
ESTRADIOL SERPL-MCNC: 145 PG/ML
FSH SERPL-ACNC: 5.9 MIU/ML
LH SERPL-ACNC: 5 IU/L

## 2019-01-14 PROCEDURE — 82306 VITAMIN D 25 HYDROXY: CPT

## 2019-01-14 PROCEDURE — 82670 ASSAY OF TOTAL ESTRADIOL: CPT

## 2019-01-14 PROCEDURE — 36415 COLL VENOUS BLD VENIPUNCTURE: CPT

## 2019-01-14 PROCEDURE — 83002 ASSAY OF GONADOTROPIN (LH): CPT

## 2019-01-14 PROCEDURE — 83001 ASSAY OF GONADOTROPIN (FSH): CPT

## 2019-02-25 ENCOUNTER — HOSPITAL ENCOUNTER (OUTPATIENT)
Dept: RADIOLOGY | Facility: MEDICAL CENTER | Age: 44
End: 2019-02-25
Attending: OBSTETRICS & GYNECOLOGY
Payer: COMMERCIAL

## 2019-02-25 DIAGNOSIS — Z12.31 VISIT FOR SCREENING MAMMOGRAM: ICD-10-CM

## 2019-04-08 ENCOUNTER — HOSPITAL ENCOUNTER (OUTPATIENT)
Dept: RADIOLOGY | Facility: MEDICAL CENTER | Age: 44
End: 2019-04-08
Attending: OBSTETRICS & GYNECOLOGY
Payer: COMMERCIAL

## 2019-04-08 DIAGNOSIS — Z12.39 SCREENING BREAST EXAMINATION: ICD-10-CM

## 2019-04-08 DIAGNOSIS — N60.19 FIBROCYSTIC BREAST CHANGES, UNSPECIFIED LATERALITY: ICD-10-CM

## 2019-04-08 DIAGNOSIS — N64.4 BREAST PAIN: ICD-10-CM

## 2019-04-08 PROCEDURE — G0279 TOMOSYNTHESIS, MAMMO: HCPCS

## 2019-04-08 PROCEDURE — 76642 ULTRASOUND BREAST LIMITED: CPT | Mod: RT

## 2019-06-24 ENCOUNTER — HOSPITAL ENCOUNTER (OUTPATIENT)
Dept: LAB | Facility: MEDICAL CENTER | Age: 44
End: 2019-06-24
Attending: FAMILY MEDICINE
Payer: COMMERCIAL

## 2019-06-24 LAB
25(OH)D3 SERPL-MCNC: 18 NG/ML (ref 30–100)
ALBUMIN SERPL BCP-MCNC: 4.4 G/DL (ref 3.2–4.9)
ALBUMIN/GLOB SERPL: 1.4 G/DL
ALP SERPL-CCNC: 77 U/L (ref 30–99)
ALT SERPL-CCNC: 23 U/L (ref 2–50)
ANION GAP SERPL CALC-SCNC: 5 MMOL/L (ref 0–11.9)
AST SERPL-CCNC: 23 U/L (ref 12–45)
BASOPHILS # BLD AUTO: 0.7 % (ref 0–1.8)
BASOPHILS # BLD: 0.05 K/UL (ref 0–0.12)
BILIRUB SERPL-MCNC: 0.5 MG/DL (ref 0.1–1.5)
BUN SERPL-MCNC: 20 MG/DL (ref 8–22)
CALCIUM SERPL-MCNC: 9.6 MG/DL (ref 8.5–10.5)
CHLORIDE SERPL-SCNC: 105 MMOL/L (ref 96–112)
CHOLEST SERPL-MCNC: 236 MG/DL (ref 100–199)
CO2 SERPL-SCNC: 26 MMOL/L (ref 20–33)
CREAT SERPL-MCNC: 0.82 MG/DL (ref 0.5–1.4)
EOSINOPHIL # BLD AUTO: 0.33 K/UL (ref 0–0.51)
EOSINOPHIL NFR BLD: 4.7 % (ref 0–6.9)
ERYTHROCYTE [DISTWIDTH] IN BLOOD BY AUTOMATED COUNT: 42.2 FL (ref 35.9–50)
GLOBULIN SER CALC-MCNC: 3.2 G/DL (ref 1.9–3.5)
GLUCOSE SERPL-MCNC: 98 MG/DL (ref 65–99)
HCT VFR BLD AUTO: 46.1 % (ref 37–47)
HDLC SERPL-MCNC: 55 MG/DL
HGB BLD-MCNC: 14.8 G/DL (ref 12–16)
IMM GRANULOCYTES # BLD AUTO: 0.03 K/UL (ref 0–0.11)
IMM GRANULOCYTES NFR BLD AUTO: 0.4 % (ref 0–0.9)
LDLC SERPL CALC-MCNC: 141 MG/DL
LYMPHOCYTES # BLD AUTO: 1.74 K/UL (ref 1–4.8)
LYMPHOCYTES NFR BLD: 24.7 % (ref 22–41)
MCH RBC QN AUTO: 30.6 PG (ref 27–33)
MCHC RBC AUTO-ENTMCNC: 32.1 G/DL (ref 33.6–35)
MCV RBC AUTO: 95.4 FL (ref 81.4–97.8)
MONOCYTES # BLD AUTO: 0.43 K/UL (ref 0–0.85)
MONOCYTES NFR BLD AUTO: 6.1 % (ref 0–13.4)
NEUTROPHILS # BLD AUTO: 4.46 K/UL (ref 2–7.15)
NEUTROPHILS NFR BLD: 63.4 % (ref 44–72)
NRBC # BLD AUTO: 0 K/UL
NRBC BLD-RTO: 0 /100 WBC
PLATELET # BLD AUTO: 215 K/UL (ref 164–446)
PMV BLD AUTO: 12.4 FL (ref 9–12.9)
POTASSIUM SERPL-SCNC: 4.1 MMOL/L (ref 3.6–5.5)
PROT SERPL-MCNC: 7.6 G/DL (ref 6–8.2)
RBC # BLD AUTO: 4.83 M/UL (ref 4.2–5.4)
SODIUM SERPL-SCNC: 136 MMOL/L (ref 135–145)
TRIGL SERPL-MCNC: 199 MG/DL (ref 0–149)
TSH SERPL DL<=0.005 MIU/L-ACNC: 2.64 UIU/ML (ref 0.38–5.33)
VIT B12 SERPL-MCNC: 268 PG/ML (ref 211–911)
WBC # BLD AUTO: 7 K/UL (ref 4.8–10.8)

## 2019-06-24 PROCEDURE — 84443 ASSAY THYROID STIM HORMONE: CPT

## 2019-06-24 PROCEDURE — 80053 COMPREHEN METABOLIC PANEL: CPT

## 2019-06-24 PROCEDURE — 85025 COMPLETE CBC W/AUTO DIFF WBC: CPT

## 2019-06-24 PROCEDURE — 82607 VITAMIN B-12: CPT

## 2019-06-24 PROCEDURE — 82306 VITAMIN D 25 HYDROXY: CPT

## 2019-06-24 PROCEDURE — 80061 LIPID PANEL: CPT

## 2019-06-24 PROCEDURE — 36415 COLL VENOUS BLD VENIPUNCTURE: CPT

## 2019-07-09 ENCOUNTER — OFFICE VISIT (OUTPATIENT)
Dept: CARDIOLOGY | Facility: MEDICAL CENTER | Age: 44
End: 2019-07-09
Payer: COMMERCIAL

## 2019-07-09 VITALS
BODY MASS INDEX: 39.05 KG/M2 | HEIGHT: 66 IN | WEIGHT: 243 LBS | SYSTOLIC BLOOD PRESSURE: 140 MMHG | DIASTOLIC BLOOD PRESSURE: 80 MMHG | OXYGEN SATURATION: 97 % | HEART RATE: 66 BPM

## 2019-07-09 DIAGNOSIS — I49.9 IRREGULAR HEART BEAT: ICD-10-CM

## 2019-07-09 DIAGNOSIS — R00.2 PALPITATIONS: ICD-10-CM

## 2019-07-09 DIAGNOSIS — Z91.89 OTHER SPECIFIED PERSONAL RISK FACTORS, NOT ELSEWHERE CLASSIFIED: ICD-10-CM

## 2019-07-09 DIAGNOSIS — I45.6 WOLFF-PARKINSON-WHITE (WPW) PATTERN: ICD-10-CM

## 2019-07-09 LAB — EKG IMPRESSION: NORMAL

## 2019-07-09 PROCEDURE — 99244 OFF/OP CNSLTJ NEW/EST MOD 40: CPT | Performed by: INTERNAL MEDICINE

## 2019-07-09 PROCEDURE — 93000 ELECTROCARDIOGRAM COMPLETE: CPT | Performed by: INTERNAL MEDICINE

## 2019-07-09 RX ORDER — ERGOCALCIFEROL 1.25 MG/1
CAPSULE ORAL
COMMUNITY
Start: 2019-06-27 | End: 2020-01-20

## 2019-07-09 RX ORDER — ESCITALOPRAM OXALATE 5 MG/1
TABLET ORAL
COMMUNITY
Start: 2019-07-06 | End: 2020-01-20

## 2019-07-09 RX ORDER — PANTOPRAZOLE SODIUM 40 MG/1
40 TABLET, DELAYED RELEASE ORAL DAILY
COMMUNITY
Start: 2019-07-06

## 2019-07-09 ASSESSMENT — ENCOUNTER SYMPTOMS
EYE DISCHARGE: 0
PALPITATIONS: 0
VOMITING: 0
INSOMNIA: 0
MYALGIAS: 0
DIZZINESS: 0
FEVER: 0
ABDOMINAL PAIN: 0
WHEEZING: 0
EYE PAIN: 0
LOSS OF CONSCIOUSNESS: 0
CHILLS: 0
BRUISES/BLEEDS EASILY: 0
BLURRED VISION: 0
HEARTBURN: 0
PND: 0
NAUSEA: 0
COUGH: 0
NERVOUS/ANXIOUS: 0
DEPRESSION: 0

## 2019-07-09 NOTE — PROGRESS NOTES
Chief Complaint   Patient presents with   • Irregular Heart Beat       Subjective:   Balbina Randall is a 44 y.o. female who presents today as a new patient.  She was sent in consultation by Dr. Esteban in regards to her palpitations.    She moved here from [place] 12 years ago.  She is a healthy 7-year-old child.  She currently works as an operating room nurse.  She enjoys it.  No setbacks or limitations.    She has been having some perimenopausal symptoms.  She has been noted to have WPW in the past.  I saw her in 2008.  Sometimes she has racing heart.  She is not sure if this is caused by some hormonal symptoms or the opposite.    Her father had open heart surgery at the age of 75.  She often worries about this.  Sometimes her heart races after eating.  No limitations in her job, sometimes has insomnia  Lexapro does not really seem to help    Past Medical History:   Diagnosis Date   • Airway problem 1/19/15    abnormal airway assessment as noted on procedure orders per MD (Dr. Serrato)   • Heart burn    • Indigestion    • Other specified symptom associated with female genital organs     ovarian cyst, adhesions   • WPW (Harleen-Parkinson-White syndrome) since birth    cardiologist, Dr. So     Past Surgical History:   Procedure Laterality Date   • HYSTEROSCOPY NOVASURE-2  4/27/2017    Procedure: HYSTEROSCOPY NOVASURE;  Surgeon: Lindsay Webb M.D.;  Location: SURGERY SAME DAY [facility];  Service:    • WRIST ARTHROSCOPY Right 1/13/2016    Procedure: WRIST ARTHROSCOPY with debridment  ;  Surgeon: Jose Daniel Redmond M.D.;  Location: SURGERY [facility];  Service:    • LIGAMENT REPAIR  1/13/2016    Procedure: LIGAMENT REPAIR;  Surgeon: Jose Daniel Redmond M.D.;  Location: SURGERY [facility];  Service:    • COLONOSCOPY WITH BIOPSY  1/26/2015    Performed by Mauricio Serrato M.D. at [facility]   • GASTROSCOPY WITH BIOPSY  1/26/2015    Performed by Mauricio Serrato M.D. at  "SURGERY [facility] ORS   • HYSTEROSCOPY WITH VIDEO DIAGNOSTIC  5/24/2013    Performed by Robert Rosario M.D. at SURGERY [facility]   • KIRT BY LAPAROSCOPY  9/2/2010    Performed by KIM ZHAO at [facility]   • PRIMARY C SECTION  4/30/2010    Performed by DAXA MILLAN at [facility]   • BOWEL RESECTION  1992    \"adhesions\"   • APPENDECTOMY  1992   • LAPAROSCOPY  1992, 2002   • OTHER      kenalog inj left foot     Family History   Problem Relation Age of Onset   • Hypertension Unknown    • Cancer Unknown      Social History     Social History   • Marital status:      Spouse name: N/A   • Number of children: N/A   • Years of education: N/A     Occupational History   • Not on file.     Social History Main Topics   • Smoking status: Never Smoker   • Smokeless tobacco: Never Used   • Alcohol use 0.0 oz/week      Comment: 1-2 per week   • Drug use: No   • Sexual activity: Not on file     Other Topics Concern   • Not on file     Social History Narrative   • No narrative on file     Allergies   Allergen Reactions   • Latex Rash     DXS=2310   • Other Drug Rash     Allergic to Estrogen IM  [id number]=4299   • Sulfa Drugs Rash and Itching     FXK=4848     Outpatient Encounter Prescriptions as of 7/9/2019   Medication Sig Dispense Refill   • pantoprazole (PROTONIX) 40 MG Tablet Delayed Response      • hyoscyamine (LEVSIN) 0.125 MG SL Tab      • escitalopram (LEXAPRO) 5 MG tablet      • vitamin D, Ergocalciferol, (DRISDOL) 09341 units Cap capsule      • Norethin Ace-Eth Estrad-FE (TAYTULLA) 1-20 MG-MCG(24) Cap Take 1 Tab by mouth every day.     • [DISCONTINUED] oxycodone immediate-release (ROXICODONE) 5 MG Tab Take 1 Tab by mouth every 3 hours as needed (Moderate Pain (NRS Pain Scale 4-6; CPOT Pain Scale 3-5)). 15 Tab 0   • [DISCONTINUED] omeprazole (PRILOSEC) 20 MG delayed-release capsule Take 1 Cap by mouth 2 Times a Day. 60 Cap 1     No facility-administered encounter medications on file " "as of 7/9/2019.      Review of Systems   Constitutional: Negative for chills and fever.   HENT: Negative for congestion and hearing loss.    Eyes: Negative for blurred vision, pain and discharge.   Respiratory: Negative for cough and wheezing.    Cardiovascular: Negative for chest pain, palpitations, leg swelling and PND.   Gastrointestinal: Negative for abdominal pain, heartburn, nausea and vomiting.   Musculoskeletal: Negative for joint pain and myalgias.   Skin: Negative for itching and rash.   Neurological: Negative for dizziness and loss of consciousness.   Endo/Heme/Allergies: Negative for environmental allergies. Does not bruise/bleed easily.   Psychiatric/Behavioral: Negative for depression. The patient is not nervous/anxious and does not have insomnia.    All other systems reviewed and are negative.       Objective:   /80 (BP Location: Left arm, Patient Position: Sitting)   Pulse 66   Ht 1.676 m (5' 6\")   Wt 110.2 kg (243 lb)   SpO2 97%   BMI 39.22 kg/m²     Physical Exam   Constitutional: She is oriented to person, place, and time. She appears well-developed and well-nourished.   HENT:   Head: Normocephalic and atraumatic.   Eyes: Pupils are equal, round, and reactive to light. EOM are normal.   Neck: No JVD present. No thyromegaly present.   Cardiovascular: Normal rate, regular rhythm and intact distal pulses.    No murmur heard.  Pulmonary/Chest: Breath sounds normal. No respiratory distress. She exhibits no tenderness.   Abdominal: Bowel sounds are normal. She exhibits no distension.   Musculoskeletal: She exhibits no edema or tenderness.   Lymphadenopathy:     She has no cervical adenopathy.   Neurological: She is alert and oriented to person, place, and time.   Skin: Skin is warm.   Psychiatric: She has a normal mood and affect. Her behavior is normal.       Assessment:     1. Irregular heart beat  EKG    EC-ECHOCARDIOGRAM COMPLETE W/O CONT    ZIO PATCH MONITOR   2. Other specified personal " risk factors, not elsewhere classified  CT-CARDIAC SCORING   3. Palpitations         Medical Decision Making:  Today's Assessment / Status / Plan:     Twelve-lead EKG done and reviewed by me.  Compared to her EKG in 2008.  Her ND interval is short but I no longer see a delta wave is prominent as we saw in the past.  Sinus rhythm    Palpitations  Discussed at length, recent blood work including thyroid normal and reassuring.  Discussed  Discussed perimenopausal symptoms  Ordered a 3-week monitor to evaluate.  Talked about home monitoring as well  Very easy to prescribe a low-dose AV anton blocker if warranted.  Discussed    Harleen-Parkinson-White  Discussed in lieu of her palpitations and abnormal EKG in the past  Monitoring as above    Family history of coronary artery disease  Reviewed lipid panel which is slightly high, talked about 10-year risk and statin therapy  Ordered calcium coronary score.  Talked about further discussion about statin if significant positive    RTC based on testing sooner with concerns

## 2019-07-15 ENCOUNTER — HOSPITAL ENCOUNTER (OUTPATIENT)
Dept: RADIOLOGY | Facility: MEDICAL CENTER | Age: 44
End: 2019-07-15
Attending: INTERNAL MEDICINE
Payer: COMMERCIAL

## 2019-07-15 DIAGNOSIS — Z91.89 OTHER SPECIFIED PERSONAL RISK FACTORS, NOT ELSEWHERE CLASSIFIED: ICD-10-CM

## 2019-07-15 PROCEDURE — 4410556 CT-CARDIAC SCORING

## 2019-07-29 ENCOUNTER — HOSPITAL ENCOUNTER (OUTPATIENT)
Dept: CARDIOLOGY | Facility: MEDICAL CENTER | Age: 44
End: 2019-07-29
Attending: INTERNAL MEDICINE
Payer: COMMERCIAL

## 2019-07-29 ENCOUNTER — TELEPHONE (OUTPATIENT)
Dept: CARDIOLOGY | Facility: MEDICAL CENTER | Age: 44
End: 2019-07-29

## 2019-07-29 DIAGNOSIS — I49.9 IRREGULAR HEART BEAT: ICD-10-CM

## 2019-07-29 PROCEDURE — 93306 TTE W/DOPPLER COMPLETE: CPT

## 2019-07-30 LAB
LV EJECT FRACT  99904: 65
LV EJECT FRACT MOD 2C 99903: 69.5
LV EJECT FRACT MOD 4C 99902: 61.07
LV EJECT FRACT MOD BP 99901: 63.62

## 2019-07-30 PROCEDURE — 93306 TTE W/DOPPLER COMPLETE: CPT | Mod: 26 | Performed by: INTERNAL MEDICINE

## 2019-08-01 ENCOUNTER — TELEPHONE (OUTPATIENT)
Dept: CARDIOLOGY | Facility: MEDICAL CENTER | Age: 44
End: 2019-08-01

## 2019-08-01 NOTE — TELEPHONE ENCOUNTER
Result Notes for EC-ECHOCARDIOGRAM COMPLETE W/O CONT     Notes recorded by Tania So M.D. on 8/1/2019 at 7:52 AM PDT  Good news -- strong and normal heart  Very mild valve leak - will watch annually       Attempted to call pt, no answer, left VM to call back.    Letter mailed to pt.

## 2019-08-01 NOTE — LETTER
August 1, 2019        Balbina Randall  [address] [facility] [address]        Dear Balbina:    Dr. Tania So reviewed you recent Echocardiogram and she stated    Good news -- strong and normal heart  Very mild valve leak - will watch annually      If you have any questions or concerns, please don't hesitate to call [phone].        Sincerely,    Matilde HELM/Dr. So

## 2019-08-27 PROCEDURE — 0298T PR EXT ECG > 48HR TO 21 DAY REVIEW AND INTERPRETATN: CPT | Performed by: INTERNAL MEDICINE

## 2019-08-27 PROCEDURE — 0296T PR EXT ECG > 48HR TO 21 DAY RCRD W/CONECT INTL RCRD: CPT | Performed by: INTERNAL MEDICINE

## 2019-08-28 ENCOUNTER — TELEPHONE (OUTPATIENT)
Dept: CARDIOLOGY | Facility: MEDICAL CENTER | Age: 44
End: 2019-08-28

## 2019-08-28 NOTE — TELEPHONE ENCOUNTER
Result Notes for ZIO PATCH MONITOR   Notes recorded by Tania So M.D. on 8/28/2019 at 7:36 AM PDT  Normal zio - but she only wore for 5d  No sxs noted     Called pt, discussed Zio per Dr So, pt verbalizes understanding

## 2019-09-04 ENCOUNTER — TELEPHONE (OUTPATIENT)
Dept: CARDIOLOGY | Facility: MEDICAL CENTER | Age: 44
End: 2019-09-04

## 2019-09-24 ENCOUNTER — IMMUNIZATION (OUTPATIENT)
Dept: OCCUPATIONAL MEDICINE | Facility: CLINIC | Age: 44
End: 2019-09-24

## 2019-09-24 DIAGNOSIS — Z23 NEED FOR VACCINATION: ICD-10-CM

## 2019-09-24 PROCEDURE — 90686 IIV4 VACC NO PRSV 0.5 ML IM: CPT | Performed by: PREVENTIVE MEDICINE

## 2019-10-16 ENCOUNTER — EH NON-PROVIDER (OUTPATIENT)
Dept: OCCUPATIONAL MEDICINE | Facility: CLINIC | Age: 44
End: 2019-10-16

## 2019-10-16 DIAGNOSIS — Z02.89 ENCOUNTER FOR OCCUPATIONAL HEALTH EXAMINATION INVOLVING RESPIRATOR: ICD-10-CM

## 2019-10-16 PROCEDURE — 94375 RESPIRATORY FLOW VOLUME LOOP: CPT | Performed by: NURSE PRACTITIONER

## 2019-10-17 ENCOUNTER — DOCUMENTATION (OUTPATIENT)
Dept: OCCUPATIONAL MEDICINE | Facility: CLINIC | Age: 44
End: 2019-10-17

## 2020-01-20 RX ORDER — CELECOXIB 200 MG/1
200 CAPSULE ORAL 2 TIMES DAILY
COMMUNITY
End: 2022-01-06

## 2020-01-20 NOTE — OR NURSING
"Preadmit appointment: \" Preparing for your Procedure information\" sheet given to patient with verbal and written instructions. Patient instructed to continue prescribed medications through the day before surgery, instructed to take the following medications the day of surgery per anesthesia protocol: Levsin, protonix.  "

## 2020-01-29 ENCOUNTER — ANESTHESIA (OUTPATIENT)
Dept: SURGERY | Facility: MEDICAL CENTER | Age: 45
End: 2020-01-29
Payer: COMMERCIAL

## 2020-01-29 ENCOUNTER — ANESTHESIA EVENT (OUTPATIENT)
Dept: SURGERY | Facility: MEDICAL CENTER | Age: 45
End: 2020-01-29
Payer: COMMERCIAL

## 2020-01-29 ENCOUNTER — HOSPITAL ENCOUNTER (OUTPATIENT)
Facility: MEDICAL CENTER | Age: 45
End: 2020-01-29
Attending: ORTHOPAEDIC SURGERY | Admitting: ORTHOPAEDIC SURGERY
Payer: COMMERCIAL

## 2020-01-29 VITALS
RESPIRATION RATE: 22 BRPM | HEIGHT: 66 IN | SYSTOLIC BLOOD PRESSURE: 113 MMHG | BODY MASS INDEX: 41.35 KG/M2 | TEMPERATURE: 97.3 F | WEIGHT: 257.28 LBS | HEART RATE: 64 BPM | OXYGEN SATURATION: 95 % | DIASTOLIC BLOOD PRESSURE: 70 MMHG

## 2020-01-29 DIAGNOSIS — S46.011A TRAUMATIC TEAR OF RIGHT ROTATOR CUFF, INITIAL ENCOUNTER: ICD-10-CM

## 2020-01-29 LAB — HCG UR QL: NEGATIVE

## 2020-01-29 PROCEDURE — 160025 RECOVERY II MINUTES (STATS): Performed by: ORTHOPAEDIC SURGERY

## 2020-01-29 PROCEDURE — 700101 HCHG RX REV CODE 250: Performed by: ANESTHESIOLOGY

## 2020-01-29 PROCEDURE — 160002 HCHG RECOVERY MINUTES (STAT): Performed by: ORTHOPAEDIC SURGERY

## 2020-01-29 PROCEDURE — 160029 HCHG SURGERY MINUTES - 1ST 30 MINS LEVEL 4: Performed by: ORTHOPAEDIC SURGERY

## 2020-01-29 PROCEDURE — 81025 URINE PREGNANCY TEST: CPT

## 2020-01-29 PROCEDURE — 700111 HCHG RX REV CODE 636 W/ 250 OVERRIDE (IP): Performed by: ANESTHESIOLOGY

## 2020-01-29 PROCEDURE — 160046 HCHG PACU - 1ST 60 MINS PHASE II: Performed by: ORTHOPAEDIC SURGERY

## 2020-01-29 PROCEDURE — 64415 NJX AA&/STRD BRCH PLXS IMG: CPT | Performed by: ORTHOPAEDIC SURGERY

## 2020-01-29 PROCEDURE — C1713 ANCHOR/SCREW BN/BN,TIS/BN: HCPCS | Performed by: ORTHOPAEDIC SURGERY

## 2020-01-29 PROCEDURE — 502581 HCHG PACK, SHOULDER ARTHROSCOPY: Performed by: ORTHOPAEDIC SURGERY

## 2020-01-29 PROCEDURE — 160048 HCHG OR STATISTICAL LEVEL 1-5: Performed by: ORTHOPAEDIC SURGERY

## 2020-01-29 PROCEDURE — 500152 HCHG CANNULA, TIB TUNNEL: Performed by: ORTHOPAEDIC SURGERY

## 2020-01-29 PROCEDURE — 501835 HCHG SUTURE PLASTIC: Performed by: ORTHOPAEDIC SURGERY

## 2020-01-29 PROCEDURE — 160041 HCHG SURGERY MINUTES - EA ADDL 1 MIN LEVEL 4: Performed by: ORTHOPAEDIC SURGERY

## 2020-01-29 PROCEDURE — 700105 HCHG RX REV CODE 258: Performed by: ORTHOPAEDIC SURGERY

## 2020-01-29 PROCEDURE — 160036 HCHG PACU - EA ADDL 30 MINS PHASE I: Performed by: ORTHOPAEDIC SURGERY

## 2020-01-29 PROCEDURE — 500028 HCHG ARTHROWAND TURBOVAC 3.5/90 SUCT.: Performed by: ORTHOPAEDIC SURGERY

## 2020-01-29 PROCEDURE — 160035 HCHG PACU - 1ST 60 MINS PHASE I: Performed by: ORTHOPAEDIC SURGERY

## 2020-01-29 PROCEDURE — 160009 HCHG ANES TIME/MIN: Performed by: ORTHOPAEDIC SURGERY

## 2020-01-29 PROCEDURE — 700105 HCHG RX REV CODE 258: Performed by: ANESTHESIOLOGY

## 2020-01-29 PROCEDURE — 700101 HCHG RX REV CODE 250: Performed by: ORTHOPAEDIC SURGERY

## 2020-01-29 DEVICE — SUTURE ANCHOR HEALICOIL REGENESORB 5.5MM: Type: IMPLANTABLE DEVICE | Site: SHOULDER | Status: FUNCTIONAL

## 2020-01-29 RX ORDER — ONDANSETRON 2 MG/ML
4 INJECTION INTRAMUSCULAR; INTRAVENOUS
Status: DISCONTINUED | OUTPATIENT
Start: 2020-01-29 | End: 2020-01-29 | Stop reason: HOSPADM

## 2020-01-29 RX ORDER — ROCURONIUM BROMIDE 10 MG/ML
INJECTION, SOLUTION INTRAVENOUS PRN
Status: DISCONTINUED | OUTPATIENT
Start: 2020-01-29 | End: 2020-01-29 | Stop reason: SURG

## 2020-01-29 RX ORDER — MIDAZOLAM HYDROCHLORIDE 1 MG/ML
INJECTION INTRAMUSCULAR; INTRAVENOUS PRN
Status: DISCONTINUED | OUTPATIENT
Start: 2020-01-29 | End: 2020-01-29 | Stop reason: SURG

## 2020-01-29 RX ORDER — HALOPERIDOL 5 MG/ML
1 INJECTION INTRAMUSCULAR
Status: DISCONTINUED | OUTPATIENT
Start: 2020-01-29 | End: 2020-01-29 | Stop reason: HOSPADM

## 2020-01-29 RX ORDER — SODIUM CHLORIDE, SODIUM GLUCONATE, SODIUM ACETATE, POTASSIUM CHLORIDE AND MAGNESIUM CHLORIDE 526; 502; 368; 37; 30 MG/100ML; MG/100ML; MG/100ML; MG/100ML; MG/100ML
500 INJECTION, SOLUTION INTRAVENOUS CONTINUOUS
Status: DISCONTINUED | OUTPATIENT
Start: 2020-01-29 | End: 2020-01-29 | Stop reason: HOSPADM

## 2020-01-29 RX ORDER — SODIUM CHLORIDE, SODIUM LACTATE, POTASSIUM CHLORIDE, CALCIUM CHLORIDE 600; 310; 30; 20 MG/100ML; MG/100ML; MG/100ML; MG/100ML
INJECTION, SOLUTION INTRAVENOUS CONTINUOUS
Status: DISCONTINUED | OUTPATIENT
Start: 2020-01-29 | End: 2020-01-29 | Stop reason: HOSPADM

## 2020-01-29 RX ORDER — SCOLOPAMINE TRANSDERMAL SYSTEM 1 MG/1
PATCH, EXTENDED RELEASE TRANSDERMAL
Status: DISCONTINUED
Start: 2020-01-29 | End: 2020-01-29 | Stop reason: HOSPADM

## 2020-01-29 RX ORDER — LIDOCAINE HYDROCHLORIDE 40 MG/ML
SOLUTION TOPICAL PRN
Status: DISCONTINUED | OUTPATIENT
Start: 2020-01-29 | End: 2020-01-29 | Stop reason: SURG

## 2020-01-29 RX ORDER — HYDROMORPHONE HYDROCHLORIDE 1 MG/ML
0.2 INJECTION, SOLUTION INTRAMUSCULAR; INTRAVENOUS; SUBCUTANEOUS
Status: DISCONTINUED | OUTPATIENT
Start: 2020-01-29 | End: 2020-01-29 | Stop reason: HOSPADM

## 2020-01-29 RX ORDER — HYDROMORPHONE HYDROCHLORIDE 1 MG/ML
0.4 INJECTION, SOLUTION INTRAMUSCULAR; INTRAVENOUS; SUBCUTANEOUS
Status: DISCONTINUED | OUTPATIENT
Start: 2020-01-29 | End: 2020-01-29 | Stop reason: HOSPADM

## 2020-01-29 RX ORDER — OXYCODONE HCL 5 MG/5 ML
5 SOLUTION, ORAL ORAL
Status: DISCONTINUED | OUTPATIENT
Start: 2020-01-29 | End: 2020-01-29 | Stop reason: HOSPADM

## 2020-01-29 RX ORDER — MEPERIDINE HYDROCHLORIDE 25 MG/ML
12.5 INJECTION INTRAMUSCULAR; INTRAVENOUS; SUBCUTANEOUS
Status: DISCONTINUED | OUTPATIENT
Start: 2020-01-29 | End: 2020-01-29 | Stop reason: HOSPADM

## 2020-01-29 RX ORDER — BUPIVACAINE HYDROCHLORIDE AND EPINEPHRINE 2.5; 5 MG/ML; UG/ML
INJECTION, SOLUTION EPIDURAL; INFILTRATION; INTRACAUDAL; PERINEURAL
Status: DISCONTINUED | OUTPATIENT
Start: 2020-01-29 | End: 2020-01-29 | Stop reason: HOSPADM

## 2020-01-29 RX ORDER — HYDROMORPHONE HYDROCHLORIDE 1 MG/ML
1 INJECTION, SOLUTION INTRAMUSCULAR; INTRAVENOUS; SUBCUTANEOUS
Status: DISCONTINUED | OUTPATIENT
Start: 2020-01-29 | End: 2020-01-29 | Stop reason: HOSPADM

## 2020-01-29 RX ORDER — OXYCODONE HYDROCHLORIDE AND ACETAMINOPHEN 5; 325 MG/1; MG/1
1-2 TABLET ORAL EVERY 6 HOURS PRN
Qty: 40 TAB | Refills: 0 | Status: SHIPPED | OUTPATIENT
Start: 2020-01-29 | End: 2020-02-05

## 2020-01-29 RX ORDER — OXYCODONE HCL 5 MG/5 ML
10 SOLUTION, ORAL ORAL
Status: DISCONTINUED | OUTPATIENT
Start: 2020-01-29 | End: 2020-01-29 | Stop reason: HOSPADM

## 2020-01-29 RX ORDER — ROPIVACAINE HYDROCHLORIDE 5 MG/ML
INJECTION, SOLUTION EPIDURAL; INFILTRATION; PERINEURAL PRN
Status: DISCONTINUED | OUTPATIENT
Start: 2020-01-29 | End: 2020-01-29 | Stop reason: SURG

## 2020-01-29 RX ORDER — ONDANSETRON 2 MG/ML
INJECTION INTRAMUSCULAR; INTRAVENOUS PRN
Status: DISCONTINUED | OUTPATIENT
Start: 2020-01-29 | End: 2020-01-29 | Stop reason: SURG

## 2020-01-29 RX ORDER — SODIUM CHLORIDE, SODIUM GLUCONATE, SODIUM ACETATE, POTASSIUM CHLORIDE AND MAGNESIUM CHLORIDE 526; 502; 368; 37; 30 MG/100ML; MG/100ML; MG/100ML; MG/100ML; MG/100ML
INJECTION, SOLUTION INTRAVENOUS
Status: DISCONTINUED | OUTPATIENT
Start: 2020-01-29 | End: 2020-01-29 | Stop reason: SURG

## 2020-01-29 RX ORDER — KETOROLAC TROMETHAMINE 30 MG/ML
INJECTION, SOLUTION INTRAMUSCULAR; INTRAVENOUS PRN
Status: DISCONTINUED | OUTPATIENT
Start: 2020-01-29 | End: 2020-01-29 | Stop reason: SURG

## 2020-01-29 RX ORDER — DIPHENHYDRAMINE HYDROCHLORIDE 50 MG/ML
12.5 INJECTION INTRAMUSCULAR; INTRAVENOUS
Status: DISCONTINUED | OUTPATIENT
Start: 2020-01-29 | End: 2020-01-29 | Stop reason: HOSPADM

## 2020-01-29 RX ORDER — IPRATROPIUM BROMIDE AND ALBUTEROL SULFATE 2.5; .5 MG/3ML; MG/3ML
3 SOLUTION RESPIRATORY (INHALATION)
Status: DISCONTINUED | OUTPATIENT
Start: 2020-01-29 | End: 2020-01-29 | Stop reason: HOSPADM

## 2020-01-29 RX ORDER — ONDANSETRON 4 MG/1
4 TABLET, FILM COATED ORAL EVERY 4 HOURS PRN
Qty: 12 TAB | Refills: 0 | Status: SHIPPED | OUTPATIENT
Start: 2020-01-29 | End: 2022-01-06

## 2020-01-29 RX ORDER — CEFAZOLIN SODIUM 1 G/3ML
INJECTION, POWDER, FOR SOLUTION INTRAMUSCULAR; INTRAVENOUS PRN
Status: DISCONTINUED | OUTPATIENT
Start: 2020-01-29 | End: 2020-01-29 | Stop reason: SURG

## 2020-01-29 RX ORDER — LIDOCAINE HYDROCHLORIDE 20 MG/ML
INJECTION, SOLUTION EPIDURAL; INFILTRATION; INTRACAUDAL; PERINEURAL PRN
Status: DISCONTINUED | OUTPATIENT
Start: 2020-01-29 | End: 2020-01-29 | Stop reason: SURG

## 2020-01-29 RX ORDER — MIDAZOLAM HYDROCHLORIDE 1 MG/ML
1 INJECTION INTRAMUSCULAR; INTRAVENOUS
Status: DISCONTINUED | OUTPATIENT
Start: 2020-01-29 | End: 2020-01-29 | Stop reason: HOSPADM

## 2020-01-29 RX ORDER — DEXAMETHASONE SODIUM PHOSPHATE 4 MG/ML
INJECTION, SOLUTION INTRA-ARTICULAR; INTRALESIONAL; INTRAMUSCULAR; INTRAVENOUS; SOFT TISSUE PRN
Status: DISCONTINUED | OUTPATIENT
Start: 2020-01-29 | End: 2020-01-29 | Stop reason: SURG

## 2020-01-29 RX ADMIN — ROPIVACAINE HYDROCHLORIDE 20 ML: 5 INJECTION, SOLUTION EPIDURAL; INFILTRATION; PERINEURAL at 12:08

## 2020-01-29 RX ADMIN — PROPOFOL 150 MG: 10 INJECTION, EMULSION INTRAVENOUS at 12:26

## 2020-01-29 RX ADMIN — PROPOFOL 50 MG: 10 INJECTION, EMULSION INTRAVENOUS at 13:35

## 2020-01-29 RX ADMIN — FENTANYL CITRATE 50 MCG: 50 INJECTION, SOLUTION INTRAMUSCULAR; INTRAVENOUS at 13:38

## 2020-01-29 RX ADMIN — ROCURONIUM BROMIDE 80 MG: 10 INJECTION, SOLUTION INTRAVENOUS at 12:26

## 2020-01-29 RX ADMIN — KETOROLAC TROMETHAMINE 30 MG: 30 INJECTION, SOLUTION INTRAMUSCULAR at 13:30

## 2020-01-29 RX ADMIN — SODIUM CHLORIDE, POTASSIUM CHLORIDE, SODIUM LACTATE AND CALCIUM CHLORIDE: 600; 310; 30; 20 INJECTION, SOLUTION INTRAVENOUS at 09:39

## 2020-01-29 RX ADMIN — MIDAZOLAM HYDROCHLORIDE 2 MG: 1 INJECTION, SOLUTION INTRAMUSCULAR; INTRAVENOUS at 12:03

## 2020-01-29 RX ADMIN — ONDANSETRON 4 MG: 2 INJECTION INTRAMUSCULAR; INTRAVENOUS at 13:30

## 2020-01-29 RX ADMIN — LIDOCAINE HYDROCHLORIDE 40 MG: 20 INJECTION, SOLUTION EPIDURAL; INFILTRATION; INTRACAUDAL; PERINEURAL at 12:26

## 2020-01-29 RX ADMIN — SODIUM CHLORIDE, SODIUM GLUCONATE, SODIUM ACETATE, POTASSIUM CHLORIDE AND MAGNESIUM CHLORIDE: 526; 502; 368; 37; 30 INJECTION, SOLUTION INTRAVENOUS at 13:09

## 2020-01-29 RX ADMIN — SUGAMMADEX 200 MG: 100 INJECTION, SOLUTION INTRAVENOUS at 13:30

## 2020-01-29 RX ADMIN — DEXAMETHASONE SODIUM PHOSPHATE 8 MG: 4 INJECTION, SOLUTION INTRAMUSCULAR; INTRAVENOUS at 12:31

## 2020-01-29 RX ADMIN — LIDOCAINE HYDROCHLORIDE 4 ML: 40 SOLUTION TOPICAL at 12:26

## 2020-01-29 RX ADMIN — CEFAZOLIN 2 G: 1 INJECTION, POWDER, FOR SOLUTION INTRAVENOUS at 12:18

## 2020-01-29 RX ADMIN — FENTANYL CITRATE 100 MCG: 50 INJECTION, SOLUTION INTRAMUSCULAR; INTRAVENOUS at 12:03

## 2020-01-29 ASSESSMENT — PAIN SCALES - GENERAL: PAIN_LEVEL: 0

## 2020-01-29 NOTE — OR NURSING
1350 received from or  resp spont with et tube intact r shoulder dressing c/d/i  Fingers warm  Cap refill<3 sec  Brace intact  Ice pack applied 1405 awake  Et tube dc'd  resp spont  Good movement r hand dressing good movement 1430 remains painfree  1500 meets discharge criteria

## 2020-01-29 NOTE — ANESTHESIA PROCEDURE NOTES
Peripheral Block  Date/Time: 1/29/2020 12:04 PM  Performed by: Nick Caceres III, M.D.  Authorized by: Nick Caceres III, M.D.     Patient Location:  Pre-op  Start Time:  1/29/2020 12:04 PM  End Time:  1/29/2020 12:08 PM  Reason for Block: at surgeon's request and post-op pain management    patient identified, IV checked, site marked, risks and benefits discussed, surgical consent, monitors and equipment checked, pre-op evaluation and timeout performed    Patient Position:  Supine  Prep: ChloraPrep    Monitoring:  Heart rate, continuous pulse ox and cardiac monitor  Block Region:  Upper Extremity  Upper Extremity - Block Type:  BRACHIAL PLEXUS block, Interscalene approach    Laterality:  Right  Procedures: ultrasound guided  Image captured, interpreted and electronically stored.  Local Infiltration:  Lidocaine  Strength:  1 %  Dose:  3 ml  Block Type:  Single-shot  Needle Length:  100mm  Needle Gauge:  21 G  Needle Localization:  Ultrasound guidance  Injection Assessment:  Negative aspiration for heme, no paresthesia on injection, incremental injection and local visualized surrounding nerve on ultrasound  Evidence of intravascular injection: No     US Guided Interscalene Brachial Plexus Block   US transducer placed on the neck in oblique plane approximately at the level of C6.  Anterior and Middle Scalene (MSM) muscles identified with nerve trunks identified between the muscles.  Needle inserted lateral to probe and advanced under direct visualization through the MSM into a perineural position.  After negative aspiration, LA injected with ease and visualized surrounding the nerve trunks.

## 2020-01-29 NOTE — ANESTHESIA PREPROCEDURE EVALUATION
Relevant Problems   CARDIAC   (+) Harleen-Parkinson-White (WPW) pattern       Physical Exam    Airway   Mallampati: II  TM distance: >3 FB  Neck ROM: full       Cardiovascular - normal exam  Rhythm: regular  Rate: normal  (-) murmur     Dental - normal exam         Pulmonary - normal exam  Breath sounds clear to auscultation     Abdominal   (+) obese     Neurological - normal exam         Other findings: Inc. BMI, WPW, GERD            Anesthesia Plan    ASA 3   ASA physical status 3 criteria: morbid obesity - BMI greater than or equal to 40    Plan - general       Airway plan will be ETT  (Interscalene nerve block for post op pain control)      Induction: intravenous    Postoperative Plan: Postoperative administration of opioids is intended.    Pertinent diagnostic labs and testing reviewed    Informed Consent:    Anesthetic plan and risks discussed with patient.    Use of blood products discussed with: patient whom consented to blood products.

## 2020-01-29 NOTE — ANESTHESIA PROCEDURE NOTES
Airway  Date/Time: 1/29/2020 12:27 PM  Performed by: Nick Caceres III, M.D.  Authorized by: Nick Caceres III, M.D.     Location:  OR  Urgency:  Elective  Difficult Airway: No    Indications for Airway Management:  Anesthesia  Spontaneous Ventilation: absent    Sedation Level:  Deep  Preoxygenated: Yes    Patient Position:  Sniffing  Final Airway Type:  Endotracheal airway  Final Endotracheal Airway:  ETT  Cuffed: Yes    Technique Used for Successful ETT Placement:  Direct laryngoscopy  Insertion Site:  Oral  Blade Type:  Deluna  Laryngoscope Blade/Videolaryngoscope Blade Size:  2  ETT Size (mm):  7.5  Measured from:  Lips  ETT to Lips (cm):  23  Placement Verified by: auscultation and capnometry    Cormack-Lehane Classification:  Grade IIb - view of arytenoids or posterior of glottis only  Number of Attempts at Approach:  1

## 2020-01-29 NOTE — OP REPORT
DATE OF SERVICE:  01/29/2020    SURGEON:  Robert Park MD    ASSISTANT:  Stuart Hanna PA-C    PREOPERATIVE DIAGNOSES:  1.  Right shoulder partial-thickness rotator cuff tear.  2.  Right shoulder subacromial impingement.  3.  Right shoulder acromioclavicular joint arthrosis.  4.  Right shoulder superior labral anterior to posterior tear.    POSTOPERATIVE DIAGNOSES:  1.  Right shoulder partial-thickness rotator cuff tear.  2.  Right shoulder subacromial impingement.  3.  Right shoulder acromioclavicular joint arthrosis.  4.  Right shoulder superior labral anterior to posterior tear.  5.  Right shoulder partial thickness subscapularis tear.    PROCEDURES PERFORMED:  1.  Right shoulder arthroscopy with rotator cuff repair.  2.  Right shoulder arthroscopy with subacromial decompression.  3.  Right shoulder arthroscopy with distal clavicle excision.  4.  Right shoulder arthroscopy with extensive debridement of glenohumeral   joint.    ANESTHESIOLOGIST:  Asif Caceres MD    ANESTHETIC:  General endotracheal anesthesia along with interscalene block for   postoperative pain control.    INDICATIONS:  The patient has had right shoulder pain for quite some time.    She has failed nonoperative treatment and elected to proceed with operative   intervention.  She was explained the risks, benefits, and alternatives to the   procedure and recovery in detail.  All questions were answered, informed   consent was obtained.  Site verification per protocol was done in the   preoperative holding area in the operating room.  The patient received   appropriate preoperative antibiotics.    DESCRIPTION OF OPERATION:  After successful anesthesia, the patient's right   shoulder was examined.  She had good range of motion, no evidence of   instability.  She was then carefully placed in modified beachchair position.    All bony prominences were well padded and supported.  Head and neck were   maintained in neutral position.  Eyes were  protected throughout the case.  A   posterior portal was then established with a knife and blunt trocar into the   glenohumeral space.  A diagnostic arthroscopy revealed normal articular   cartilage on the glenoid.  The humerus just had some minor cobblestoning, but   no loose edges or flaps.  The remainder of the humeral head was normal.  That   was just a fairly small area.  The subscapularis had a partial thickness tear   on the posterior aspect superiorly, but no detachment.  This was debrided with   a shaver, probed and felt to be stable.  Biceps was normal to visual   inspection and probing.  The rotator cuff had a fairly large undersurface tear   of the anterior aspect of the supraspinatus.  This was debrided and measured   about 8 mm.  It was then marked with a PDS.  The remainder of the undersurface   of the rotator cuff was normal.  I then lavaged out the joint.  She also did   have SLAP tear and this was debrided intraarticularly from the anterior and   posterior aspects and a little bit superiorly, but the biceps anchor was   stable.  I then lavaged out the joint with the subacromial space.  I did a   thorough bursectomy of a very thickened bursa anteriorly, posteriorly and   laterally.  After a thorough bursectomy, she had a hooked acromion and   acromioplasty with a 5.5 resector from both lateral and posterior portals to a   type 1.  Then, I took 8 mm of distal clavicle circumferentially using a 5.5   resector preserving the posterior and superior capsule.  I then lavaged all   the bony debris, found the PDS and was easily able to complete the tear.    There was just a few bursal-sided fibers holding on.  I completed the tear,   roughened up the tuberosity to bleeding bone, leaving the cortical shell.  I   did some marrow venting.  I then placed a Healicoil 5.5 triple loaded anchor.    I passed 3 sutures well spaced, protecting the biceps.  These were all tied   securely, which really repaired the  rotator cuff onto the tuberosity well.  At   that point, I probed it, put it through range of motion and was happy with   the procedure.  I lavaged out the joint, suctioned out the fluid, closed the   portals with 3-0 Prolene in interrupted fashion.  Xeroform, 4x4s, Medipore   tape and UltraSling was applied.    ESTIMATED BLOOD LOSS:  Minimal.    COMPLICATIONS:  None.    Stuart Hanna PA-C, was medically necessary for the case.  He helped with   instrumentation, retraction, and positioning.  Without his help, the case   would not have been as technically successful.    COMPONENTS USED:  One [employer] 5.5 Healicoil.       ____________________________________     MD DAYANA VINCENT / AKANKSHA    DD:  01/29/2020 13:49:50  DT:  01/29/2020 14:09:29    D#:  [id number]  Job#:  [id number]

## 2020-01-29 NOTE — ANESTHESIA POSTPROCEDURE EVALUATION
Patient: Balbina Randall    Procedure Summary     Date:  01/29/20 Room / Location:   OR 06 / SURGERY [facility]    Anesthesia Start:  1218 Anesthesia Stop:  1354    Procedures:       DECOMPRESSION, SHOULDER, ARTHROSCOPIC - SUBACROMIAL, EXTENSIVE DEBRIDEMENT (Right Shoulder)      EXCISION, CLAVICLE, DISTAL (Right Shoulder)      REPAIR, ROTATOR CUFF (Right Shoulder) Diagnosis:  (SHOULDER IMPINGEMENT SYNDROME RIGHT, OA OF RIGHT SHOULDER)    Surgeon:  Robert Park M.D. Responsible Provider:  Nick Caceres III, M.D.    Anesthesia Type:  general ASA Status:  3          Final Anesthesia Type: general  Last vitals  BP   Blood Pressure: 113/70    Temp   36.2 °C (97.2 °F)    Pulse   Pulse: 68   Resp   16    SpO2   90 %      Anesthesia Post Evaluation    Patient location during evaluation: PACU  Patient participation: complete - patient participated  Level of consciousness: awake and alert  Pain score: 0    Airway patency: patent  Anesthetic complications: no  Cardiovascular status: hemodynamically stable  Respiratory status: acceptable  Hydration status: euvolemic    PONV: none           Nurse Pain Score: 0 (NPRS)

## 2020-01-29 NOTE — ANESTHESIA TIME REPORT
Anesthesia Start and Stop Event Times     Date Time Event    1/29/2020 0950 Ready for Procedure     1218 Anesthesia Start     1354 Anesthesia Stop        Responsible Staff  01/29/20    Name Role Begin End    Nick Caceres III, M.D. Anesth 1218 1354        Preop Diagnosis (Free Text):  Pre-op Diagnosis     SHOULDER IMPINGEMENT SYNDROME RIGHT, OA OF RIGHT SHOULDER        Preop Diagnosis (Codes):    Post op Diagnosis  Impingement syndrome of right shoulder      Premium Reason  Non-Premium    Comments:

## 2020-01-29 NOTE — DISCHARGE INSTRUCTIONS
ACTIVITY: Rest and take it easy for the first 24 hours.  A responsible adult is recommended to remain with you during that time.  It is normal to feel sleepy.  We encourage you to not do anything that requires balance, judgment or coordination.    MILD FLU-LIKE SYMPTOMS ARE NORMAL. YOU MAY EXPERIENCE GENERALIZED MUSCLE ACHES, THROAT IRRITATION, HEADACHE AND/OR SOME NAUSEA.    FOR 24 HOURS DO NOT:  Drive, operate machinery or run household appliances.  Drink beer or alcoholic beverages.   Make important decisions or sign legal documents.    SPECIAL INSTRUCTIONS Ice and elevate extremity     DIET: To avoid nausea, slowly advance diet as tolerated, avoiding spicy or greasy foods for the first day.  Add more substantial food to your diet according to your physician's instructions.  INCREASE FLUIDS AND FIBER TO AVOID CONSTIPATION.    SURGICAL DRESSING/BATHING: May remove dressing post-op day number 2 {Friday} and shower with incision uncovered  Then apply bandaids.  Do not soak or submerge incisions for two weeks.    FOLLOW-UP APPOINTMENT:  A follow-up appointment should be arranged with your doctor in 7-10 days ; call to schedule.    You should CALL YOUR PHYSICIAN if you develop:  Fever greater than 101 degrees F.  Pain not relieved by medication, or persistent nausea or vomiting.  Excessive bleeding (blood soaking through dressing) or unexpected drainage from the wound.  Extreme redness or swelling around the incision site, drainage of pus or foul smelling drainage.  Inability to urinate or empty your bladder within 8 hours.  Problems with breathing or chest pain.    You should call 911 if you develop problems with breathing or chest pain.  If you are unable to contact your doctor or surgical center, you should go to the nearest emergency room or urgent care center.  Physician's telephone #: [phone]    If any questions arise, call your doctor.  If your doctor is not available, please feel free to call the Surgical  Center at [phone].  The Center is open Monday through Friday from 7AM to 7PM.  You can also call the HEALTH HOTLINE open 24 hours/day, 7 days/week and speak to a nurse at [phone], or toll free at [phone].    A registered nurse may call you a few days after your surgery to see how you are doing after your procedure.    MEDICATIONS: Resume taking daily medication.  Take prescribed pain medication with food.  If no medication is prescribed, you may take non-aspirin pain medication if needed.  PAIN MEDICATION CAN BE VERY CONSTIPATING.  Take a stool softener or laxative such as senokot, pericolace, or milk of magnesia if needed.    Prescription given for Zofran and Percocet.  Last pain medication given at .    If your physician has prescribed pain medication that includes Acetaminophen (Tylenol), do not take additional Acetaminophen (Tylenol) while taking the prescribed medication.    Depression / Suicide Risk    As you are discharged from this [facility] facility, it is important to learn how to keep safe from harming yourself.    Recognize the warning signs:  · Abrupt changes in personality, positive or negative- including increase in energy   · Giving away possessions  · Change in eating patterns- significant weight changes-  positive or negative  · Change in sleeping patterns- unable to sleep or sleeping all the time   · Unwillingness or inability to communicate  · Depression  · Unusual sadness, discouragement and loneliness  · Talk of wanting to die  · Neglect of personal appearance   · Rebelliousness- reckless behavior  · Withdrawal from people/activities they love  · Confusion- inability to concentrate     If you or a loved one observes any of these behaviors or has concerns about self-harm, here's what you can do:  · Talk about it- your feelings and reasons for harming yourself  · Remove any means that you might use to hurt yourself (examples: pills, rope, extension cords, firearm)  · Get  "professional help from the community (Mental Health, Substance Abuse, psychological counseling)  · Do not be alone:Call your Safe Contact- someone whom you trust who will be there for you.  · Call your local CRISIS HOTLINE 456-5690 or 984-542-6748  · Call your local Children's Mobile Crisis Response Team [place] (489) 479-4422 or www.Globitel  · Call the toll free National Suicide Prevention Hotlines   · National Suicide Prevention Lifeline 573-059-PJZN (8214)  [employer] 800-SUICIDE (438-8844)    Peripheral Nerve Block Discharge Instructions from Same Day Surgery and Inpatient :    What to Expect - Upper Extremity  · You may experience numbness and weakness in shoulder, arm and hand  on the same side as your surgery  · This is normal. For some people, this may be an unpleasant sensation. Be very careful with your numb limb  · Ask for help when you need it  Shoulder Surgery Side Effects  · In addition to numbness and weakness you may experience other symptoms  · Other nerves that are close to those nerves injected can also be affected by local anesthesia  · You may experience a hoarseness in your voice  · Your breathing may feel different  · You may also notice drooping of your eyelid, pupil constriction, and decreased sweating, on the side of your surgery  · All of these side effects are normal and will resolve when the local anesthetic wears off   Prevent Injury  · Protect the limb like a baby  · Beware of exposing your limb to extreme heat or cold or trauma  · The limb may be injured without you noticing because it is numb  · Keep the limb elevated whenever possible  · Do not sleep on the limb  · Change the position of the limb regularly  · Avoid putting pressure on your surgical limb  Pain Control  · The initial block on the day of surgery will make your extremity feel \"numb\"  · Any consecutive injection including prior to discharge from the hospital will make your extremity feel " "\"numb\"  · You may feel an aching or burning when the local anesthesia starts to wear off  · Take pain pills as prescribed by your surgeon  · Call your surgeon or anesthesiologist if you do not have adequate pain control    "

## 2020-01-29 NOTE — OR NURSING
"1507: Settled in recliner post short ambulation from bathroom. Pt slightly dyspneic, verbalizes \"hard to take a deep breath\". BBS diminished but clear t/o.  Educated this can be side effect of block. Encouraged to DB&C (efffective) and use IS (reaching approx volume of 1000cc). Will continue to monitor.  1520: Family at chairside, straps of immobilizer adjusted for pt comfort.   1550: Pt verbalizes readiness for d/c, Eupneic.  1557: D/Misael to care of family post pt  speaking with both anesthesiologist and surgeon      "

## 2020-04-20 ENCOUNTER — OFFICE VISIT (OUTPATIENT)
Dept: URGENT CARE | Facility: CLINIC | Age: 45
End: 2020-04-20
Payer: COMMERCIAL

## 2020-04-20 ENCOUNTER — APPOINTMENT (OUTPATIENT)
Dept: URGENT CARE | Facility: PHYSICIAN GROUP | Age: 45
End: 2020-04-20
Payer: COMMERCIAL

## 2020-04-20 VITALS
TEMPERATURE: 96.7 F | HEIGHT: 66 IN | BODY MASS INDEX: 41.08 KG/M2 | OXYGEN SATURATION: 97 % | SYSTOLIC BLOOD PRESSURE: 126 MMHG | RESPIRATION RATE: 16 BRPM | DIASTOLIC BLOOD PRESSURE: 72 MMHG | WEIGHT: 255.6 LBS | HEART RATE: 71 BPM

## 2020-04-20 DIAGNOSIS — H92.02 OTALGIA, LEFT: ICD-10-CM

## 2020-04-20 DIAGNOSIS — H66.92 ACUTE OTITIS MEDIA, LEFT: ICD-10-CM

## 2020-04-20 DIAGNOSIS — R51.9 SINUS HEADACHE: ICD-10-CM

## 2020-04-20 DIAGNOSIS — Z86.19 HISTORY OF CANDIDIASIS: ICD-10-CM

## 2020-04-20 PROCEDURE — 99203 OFFICE O/P NEW LOW 30 MIN: CPT | Performed by: PHYSICIAN ASSISTANT

## 2020-04-20 RX ORDER — FLUCONAZOLE 150 MG/1
150 TABLET ORAL DAILY
Qty: 2 TAB | Refills: 0 | Status: SHIPPED | OUTPATIENT
Start: 2020-04-20 | End: 2020-04-22

## 2020-04-20 RX ORDER — CELECOXIB 200 MG/1
200 CAPSULE ORAL 2 TIMES DAILY
Qty: 60 CAP | Refills: 0 | Status: SHIPPED | OUTPATIENT
Start: 2020-04-20 | End: 2020-05-20

## 2020-04-20 RX ORDER — AMOXICILLIN 875 MG/1
875 TABLET, COATED ORAL 2 TIMES DAILY
Qty: 20 TAB | Refills: 0 | Status: SHIPPED | OUTPATIENT
Start: 2020-04-20 | End: 2022-01-06

## 2020-04-20 RX ORDER — KETOROLAC TROMETHAMINE 30 MG/ML
30 INJECTION, SOLUTION INTRAMUSCULAR; INTRAVENOUS ONCE
Status: COMPLETED | OUTPATIENT
Start: 2020-04-20 | End: 2020-04-20

## 2020-04-20 RX ADMIN — KETOROLAC TROMETHAMINE 30 MG: 30 INJECTION, SOLUTION INTRAMUSCULAR; INTRAVENOUS at 16:07

## 2020-04-20 ASSESSMENT — ENCOUNTER SYMPTOMS
ABDOMINAL PAIN: 0
COUGH: 0
SHORTNESS OF BREATH: 0
HEADACHES: 1
NAUSEA: 0
SENSORY CHANGE: 0
VOMITING: 0
DIARRHEA: 0
CHILLS: 0
SORE THROAT: 1
FEVER: 0
NECK PAIN: 0
TINGLING: 0
STRIDOR: 0
DIZZINESS: 0
EYES NEGATIVE: 1
BRUISES/BLEEDS EASILY: 0
RHINORRHEA: 1

## 2020-04-20 ASSESSMENT — FIBROSIS 4 INDEX: FIB4 SCORE: 1.05

## 2020-04-20 NOTE — PROGRESS NOTES
Subjective:      Balbina Randall is a 45 y.o. female who presents with Otalgia (x2 days, L ear pain, L throat drainage)    PMH:  has a past medical history of Airway problem (1/19/15), Heart burn, Indigestion, Other specified symptom associated with female genital organs, Harleen-Parkinson-White syndrome, and WPW (Harleen-Parkinson-White syndrome) (since birth).  MEDS:   Current Outpatient Medications:   •  amoxicillin (AMOXIL) 875 MG tablet, Take 1 Tab by mouth 2 times a day., Disp: 20 Tab, Rfl: 0  •  fluconazole (DIFLUCAN) 150 MG tablet, Take 1 Tab by mouth every day for 2 days., Disp: 2 Tab, Rfl: 0  •  celecoxib (CELEBREX) 200 MG Cap, Take 1 Cap by mouth 2 times a day for 30 days., Disp: 60 Cap, Rfl: 0  •  celecoxib (CELEBREX) 200 MG Cap, Take 200 mg by mouth 2 times a day., Disp: , Rfl:   •  pantoprazole (PROTONIX) 40 MG Tablet Delayed Response, , Disp: , Rfl:   •  hyoscyamine (LEVSIN) 0.125 MG SL Tab, , Disp: , Rfl:   •  ondansetron (ZOFRAN) 4 MG Tab tablet, Take 1 Tab by mouth every four hours as needed. (Patient not taking: Reported on 4/20/2020), Disp: 12 Tab, Rfl: 0    Current Facility-Administered Medications:   •  ketorolac (TORADOL) injection 30 mg, 30 mg, Intramuscular, Once, Yelena Acosta, P.A.-C.  ALLERGIES:   Allergies   Allergen Reactions   • Latex Rash     [id number]   • Other Drug Rash     Allergic to Estrogen IM - preservative within medication  [id number]=9803   • Sulfa Drugs Rash and Itching     [id number]=6287     SURGHX:   Past Surgical History:   Procedure Laterality Date   • PB SHLDR ARTHROSCOP,PART ACROMIOPLAS Right 1/29/2020    Procedure: DECOMPRESSION, SHOULDER, ARTHROSCOPIC - SUBACROMIAL, EXTENSIVE DEBRIDEMENT;  Surgeon: Robert Park M.D.;  Location: [facility];  Service: Orthopedics   • CLAVICLE DISTAL EXCISION Right 1/29/2020    Procedure: EXCISION, CLAVICLE, DISTAL;  Surgeon: Robert Park M.D.;  Location: [facility];  Service: Orthopedics   • ROTATOR CUFF REPAIR  "Right 1/29/2020    Procedure: REPAIR, ROTATOR CUFF;  Surgeon: Robert Park M.D.;  Location: SURGERY [facility];  Service: Orthopedics   • HYSTEROSCOPY NOVASURE-2  4/27/2017    Procedure: HYSTEROSCOPY NOVASURE;  Surgeon: Daxa Webb M.D.;  Location: SURGERY SAME DAY [facility];  Service:    • WRIST ARTHROSCOPY Right 1/13/2016    Procedure: WRIST ARTHROSCOPY with debridment  ;  Surgeon: Jose Daniel Redmond M.D.;  Location: SURGERY [facility];  Service:    • LIGAMENT REPAIR  1/13/2016    Procedure: LIGAMENT REPAIR;  Surgeon: Jose Daniel Redmond M.D.;  Location: SURGERY [facility];  Service:    • COLONOSCOPY WITH BIOPSY  1/26/2015    Performed by Mauricio Serrato M.D. at [facility]   • GASTROSCOPY WITH BIOPSY  1/26/2015    Performed by Mauricio Serrato M.D. at [facility]   • HYSTEROSCOPY WITH VIDEO DIAGNOSTIC  5/24/2013    Performed by Robert Rosario M.D. at [facility]   • KIRT BY LAPAROSCOPY  9/2/2010    Performed by KIM ZHAO at [facility]   • PRIMARY C SECTION  4/30/2010    Performed by DAXA WEBB at [facility]OR AND DELIVERY   • BOWEL RESECTION  1992    \"adhesions\"   • APPENDECTOMY  1992   • LAPAROSCOPY  1992, 2002   • OTHER      kenalog inj left foot     SOCHX:  reports that she has never smoked. She has never used smokeless tobacco. She reports current alcohol use. She reports that she does not use drugs.  FH: Reviewed with patient, not pertinent to this visit.           Patient presents with:  Otalgia: x2 days, L ear pain, L sided sore throat. Post nasal drainage for the last week. Pt has history of TMJ which always causes a little pain, but this feels different, ear feels clogged/pressure especially when lying down.  Pt denies fever chills or cough, but has some nasal congestion and post nasal drip due to seasonal allergies.         Otalgia    There is pain in the left ear. This is a new problem. The current episode " "started in the past 7 days. The problem occurs constantly. The problem has been gradually worsening. There has been no fever. The pain is at a severity of 7/10. The pain is moderate. Associated symptoms include headaches, rhinorrhea and a sore throat. Pertinent negatives include no abdominal pain, coughing, diarrhea, ear discharge, hearing loss, neck pain, rash or vomiting. She has tried acetaminophen (position changes, warm compress, bite guard) for the symptoms. The treatment provided no relief. There is no history of a chronic ear infection, hearing loss or a tympanostomy tube.       Review of Systems   Constitutional: Negative for chills and fever.   HENT: Positive for ear pain, rhinorrhea and sore throat. Negative for congestion, ear discharge and hearing loss.         Dental pain   Eyes: Negative.    Respiratory: Negative for cough, shortness of breath and stridor.    Cardiovascular: Negative for chest pain.   Gastrointestinal: Negative for abdominal pain, diarrhea, nausea and vomiting.   Musculoskeletal: Negative for neck pain.   Skin: Negative for rash.   Neurological: Positive for headaches. Negative for dizziness, tingling and sensory change.   Endo/Heme/Allergies: Positive for environmental allergies. Does not bruise/bleed easily.   All other systems reviewed and are negative.         Objective:     /72   Pulse 71   Temp 35.9 °C (96.7 °F) (Temporal)   Resp 16   Ht 1.676 m (5' 6\")   Wt 115.9 kg (255 lb 9.6 oz)   SpO2 97%   BMI 41.25 kg/m²      Physical Exam  Vitals signs and nursing note reviewed.   Constitutional:       General: She is not in acute distress.     Appearance: Normal appearance. She is well-developed. She is obese. She is not ill-appearing or toxic-appearing.   HENT:      Head: Normocephalic and atraumatic.      Right Ear: Tympanic membrane is retracted.      Left Ear: A middle ear effusion is present. Tympanic membrane is injected, erythematous and retracted.      Nose: Mucosal " edema, congestion and rhinorrhea present. Rhinorrhea is clear.      Mouth/Throat:      Lips: Pink.      Mouth: Mucous membranes are moist.      Pharynx: Uvula midline.   Eyes:      Extraocular Movements: Extraocular movements intact.      Conjunctiva/sclera: Conjunctivae normal.      Pupils: Pupils are equal, round, and reactive to light.   Neck:      Musculoskeletal: Normal range of motion and neck supple.   Cardiovascular:      Rate and Rhythm: Normal rate and regular rhythm.      Heart sounds: Normal heart sounds.   Pulmonary:      Effort: Pulmonary effort is normal.   Abdominal:      Palpations: Abdomen is soft.   Musculoskeletal: Normal range of motion.   Skin:     General: Skin is warm and dry.      Capillary Refill: Capillary refill takes less than 2 seconds.   Neurological:      Mental Status: She is alert and oriented to person, place, and time.      Gait: Gait normal.   Psychiatric:         Mood and Affect: Mood normal.                 Assessment/Plan:     1. Acute otitis media, left  ketorolac (TORADOL) injection 30 mg    amoxicillin (AMOXIL) 875 MG tablet    fluconazole (DIFLUCAN) 150 MG tablet    celecoxib (CELEBREX) 200 MG Cap   2. Otalgia, left  ketorolac (TORADOL) injection 30 mg    amoxicillin (AMOXIL) 875 MG tablet    fluconazole (DIFLUCAN) 150 MG tablet    celecoxib (CELEBREX) 200 MG Cap   3. Sinus headache  ketorolac (TORADOL) injection 30 mg    amoxicillin (AMOXIL) 875 MG tablet    fluconazole (DIFLUCAN) 150 MG tablet    celecoxib (CELEBREX) 200 MG Cap   4. History of candidiasis  amoxicillin (AMOXIL) 875 MG tablet    fluconazole (DIFLUCAN) 150 MG tablet     PT can continue OTC medications, increase fluids and rest until symptoms improve.     PT to begin medications today as prescribed.    PT was instructed to begin a daily OTC allergy medication for relief of allergy symptoms.  Ex: allegra, claritin, zyrtec, allerclear, etc.   Pt can also take OTC benadryl at bedtime if symptoms are keeping  them awake at night.     PT should follow up with PCP in 1-2 days for re-evaluation if symptoms have not improved.  Discussed red flags and reasons to return to UC or ED.  Pt and/or family verbalized understanding of diagnosis and follow up instructions and was offered informational handout on diagnosis.  PT discharged.

## 2020-09-25 ENCOUNTER — IMMUNIZATION (OUTPATIENT)
Dept: OCCUPATIONAL MEDICINE | Facility: CLINIC | Age: 45
End: 2020-09-25

## 2020-09-25 DIAGNOSIS — Z23 NEED FOR VACCINATION: ICD-10-CM

## 2020-09-25 PROCEDURE — 90686 IIV4 VACC NO PRSV 0.5 ML IM: CPT | Performed by: NURSE PRACTITIONER

## 2020-12-20 DIAGNOSIS — Z23 NEED FOR VACCINATION: ICD-10-CM

## 2020-12-21 ENCOUNTER — APPOINTMENT (OUTPATIENT)
Dept: FAMILY PLANNING/WOMEN'S HEALTH CLINIC | Facility: IMMUNIZATION CENTER | Age: 45
End: 2020-12-21
Attending: FAMILY MEDICINE
Payer: COMMERCIAL

## 2020-12-21 ENCOUNTER — IMMUNIZATION (OUTPATIENT)
Dept: FAMILY PLANNING/WOMEN'S HEALTH CLINIC | Facility: IMMUNIZATION CENTER | Age: 45
End: 2020-12-21
Payer: COMMERCIAL

## 2020-12-21 DIAGNOSIS — Z23 ENCOUNTER FOR VACCINATION: Primary | ICD-10-CM

## 2020-12-21 DIAGNOSIS — Z23 NEED FOR VACCINATION: ICD-10-CM

## 2020-12-21 PROCEDURE — 91300 PFIZER SARS-COV-2 VACCINE: CPT

## 2020-12-21 PROCEDURE — 0001A PFIZER SARS-COV-2 VACCINE: CPT

## 2021-01-11 ENCOUNTER — IMMUNIZATION (OUTPATIENT)
Dept: FAMILY PLANNING/WOMEN'S HEALTH CLINIC | Facility: IMMUNIZATION CENTER | Age: 46
End: 2021-01-11
Attending: FAMILY MEDICINE
Payer: COMMERCIAL

## 2021-01-11 DIAGNOSIS — Z23 ENCOUNTER FOR VACCINATION: Primary | ICD-10-CM

## 2021-01-11 PROCEDURE — 0002A PFIZER SARS-COV-2 VACCINE: CPT

## 2021-01-11 PROCEDURE — 91300 PFIZER SARS-COV-2 VACCINE: CPT

## 2021-03-15 ENCOUNTER — HOSPITAL ENCOUNTER (OUTPATIENT)
Dept: LAB | Facility: MEDICAL CENTER | Age: 46
End: 2021-03-15
Attending: FAMILY MEDICINE
Payer: COMMERCIAL

## 2021-03-15 ENCOUNTER — HOSPITAL ENCOUNTER (OUTPATIENT)
Dept: LAB | Facility: MEDICAL CENTER | Age: 46
End: 2021-03-15
Attending: OBSTETRICS & GYNECOLOGY
Payer: COMMERCIAL

## 2021-03-15 LAB
ALBUMIN SERPL BCP-MCNC: 4.2 G/DL (ref 3.2–4.9)
ALBUMIN/GLOB SERPL: 1.4 G/DL
ALP SERPL-CCNC: 98 U/L (ref 30–99)
ALT SERPL-CCNC: 41 U/L (ref 2–50)
ANION GAP SERPL CALC-SCNC: 10 MMOL/L (ref 7–16)
AST SERPL-CCNC: 37 U/L (ref 12–45)
BASOPHILS # BLD AUTO: 1.2 % (ref 0–1.8)
BASOPHILS # BLD: 0.07 K/UL (ref 0–0.12)
BILIRUB SERPL-MCNC: 0.4 MG/DL (ref 0.1–1.5)
BUN SERPL-MCNC: 20 MG/DL (ref 8–22)
CALCIUM SERPL-MCNC: 9.8 MG/DL (ref 8.5–10.5)
CHLORIDE SERPL-SCNC: 104 MMOL/L (ref 96–112)
CHOLEST SERPL-MCNC: 206 MG/DL (ref 100–199)
CO2 SERPL-SCNC: 25 MMOL/L (ref 20–33)
CREAT SERPL-MCNC: 0.73 MG/DL (ref 0.5–1.4)
EOSINOPHIL # BLD AUTO: 0.35 K/UL (ref 0–0.51)
EOSINOPHIL NFR BLD: 5.8 % (ref 0–6.9)
ERYTHROCYTE [DISTWIDTH] IN BLOOD BY AUTOMATED COUNT: 44.2 FL (ref 35.9–50)
GLOBULIN SER CALC-MCNC: 3.1 G/DL (ref 1.9–3.5)
GLUCOSE SERPL-MCNC: 92 MG/DL (ref 65–99)
HCT VFR BLD AUTO: 44.9 % (ref 37–47)
HDLC SERPL-MCNC: 54 MG/DL
HGB BLD-MCNC: 14.8 G/DL (ref 12–16)
IMM GRANULOCYTES # BLD AUTO: 0.02 K/UL (ref 0–0.11)
IMM GRANULOCYTES NFR BLD AUTO: 0.3 % (ref 0–0.9)
LDLC SERPL CALC-MCNC: 126 MG/DL
LYMPHOCYTES # BLD AUTO: 2.27 K/UL (ref 1–4.8)
LYMPHOCYTES NFR BLD: 37.9 % (ref 22–41)
MCH RBC QN AUTO: 31 PG (ref 27–33)
MCHC RBC AUTO-ENTMCNC: 33 G/DL (ref 33.6–35)
MCV RBC AUTO: 93.9 FL (ref 81.4–97.8)
MONOCYTES # BLD AUTO: 0.43 K/UL (ref 0–0.85)
MONOCYTES NFR BLD AUTO: 7.2 % (ref 0–13.4)
NEUTROPHILS # BLD AUTO: 2.85 K/UL (ref 2–7.15)
NEUTROPHILS NFR BLD: 47.6 % (ref 44–72)
NRBC # BLD AUTO: 0 K/UL
NRBC BLD-RTO: 0 /100 WBC
PLATELET # BLD AUTO: 228 K/UL (ref 164–446)
PMV BLD AUTO: 12.6 FL (ref 9–12.9)
POTASSIUM SERPL-SCNC: 4.4 MMOL/L (ref 3.6–5.5)
PROT SERPL-MCNC: 7.3 G/DL (ref 6–8.2)
RBC # BLD AUTO: 4.78 M/UL (ref 4.2–5.4)
RHEUMATOID FACT SER IA-ACNC: <10 IU/ML (ref 0–14)
SODIUM SERPL-SCNC: 139 MMOL/L (ref 135–145)
TRIGL SERPL-MCNC: 132 MG/DL (ref 0–149)
WBC # BLD AUTO: 6 K/UL (ref 4.8–10.8)

## 2021-03-15 PROCEDURE — 86038 ANTINUCLEAR ANTIBODIES: CPT

## 2021-03-15 PROCEDURE — 85025 COMPLETE CBC W/AUTO DIFF WBC: CPT

## 2021-03-15 PROCEDURE — 84443 ASSAY THYROID STIM HORMONE: CPT

## 2021-03-15 PROCEDURE — 86431 RHEUMATOID FACTOR QUANT: CPT

## 2021-03-15 PROCEDURE — 36415 COLL VENOUS BLD VENIPUNCTURE: CPT

## 2021-03-15 PROCEDURE — 86431 RHEUMATOID FACTOR QUANT: CPT | Mod: 91

## 2021-03-15 PROCEDURE — 86038 ANTINUCLEAR ANTIBODIES: CPT | Mod: 91

## 2021-03-15 PROCEDURE — 82306 VITAMIN D 25 HYDROXY: CPT

## 2021-03-15 PROCEDURE — 86200 CCP ANTIBODY: CPT

## 2021-03-15 PROCEDURE — 80053 COMPREHEN METABOLIC PANEL: CPT

## 2021-03-15 PROCEDURE — 80061 LIPID PANEL: CPT

## 2021-03-16 LAB
25(OH)D3 SERPL-MCNC: 23 NG/ML (ref 30–100)
RHEUMATOID FACT SER IA-ACNC: <10 IU/ML (ref 0–14)
TSH SERPL DL<=0.005 MIU/L-ACNC: 1.5 UIU/ML (ref 0.38–5.33)

## 2021-03-17 LAB
CCP IGG SERPL-ACNC: 3 UNITS (ref 0–19)
NUCLEAR IGG SER QL IA: NORMAL
NUCLEAR IGG SER QL IA: NORMAL

## 2021-04-05 ENCOUNTER — HOSPITAL ENCOUNTER (OUTPATIENT)
Dept: RADIOLOGY | Facility: MEDICAL CENTER | Age: 46
End: 2021-04-05
Attending: FAMILY MEDICINE
Payer: COMMERCIAL

## 2021-04-05 DIAGNOSIS — Z12.31 VISIT FOR SCREENING MAMMOGRAM: ICD-10-CM

## 2021-04-05 PROCEDURE — 77063 BREAST TOMOSYNTHESIS BI: CPT

## 2021-04-23 ENCOUNTER — HOSPITAL ENCOUNTER (OUTPATIENT)
Dept: RADIOLOGY | Facility: MEDICAL CENTER | Age: 46
End: 2021-04-23
Attending: FAMILY MEDICINE
Payer: COMMERCIAL

## 2021-04-23 DIAGNOSIS — R07.2 PRECORDIAL PAIN: ICD-10-CM

## 2021-04-23 PROCEDURE — 71260 CT THORAX DX C+: CPT

## 2021-04-23 PROCEDURE — 700117 HCHG RX CONTRAST REV CODE 255: Performed by: FAMILY MEDICINE

## 2021-04-23 RX ADMIN — IOHEXOL 75 ML: 350 INJECTION, SOLUTION INTRAVENOUS at 14:10

## 2021-05-11 ENCOUNTER — HOSPITAL ENCOUNTER (OUTPATIENT)
Dept: LAB | Facility: MEDICAL CENTER | Age: 46
End: 2021-05-11
Attending: FAMILY MEDICINE
Payer: COMMERCIAL

## 2021-05-11 LAB
ALBUMIN SERPL BCP-MCNC: 4.2 G/DL (ref 3.2–4.9)
ALBUMIN/GLOB SERPL: 1.3 G/DL
ALP SERPL-CCNC: 114 U/L (ref 30–99)
ALT SERPL-CCNC: 38 U/L (ref 2–50)
AMYLASE SERPL-CCNC: 33 U/L (ref 20–103)
ANION GAP SERPL CALC-SCNC: 9 MMOL/L (ref 7–16)
AST SERPL-CCNC: 32 U/L (ref 12–45)
BASOPHILS # BLD AUTO: 0.7 % (ref 0–1.8)
BASOPHILS # BLD: 0.06 K/UL (ref 0–0.12)
BILIRUB SERPL-MCNC: 0.2 MG/DL (ref 0.1–1.5)
BUN SERPL-MCNC: 25 MG/DL (ref 8–22)
CALCIUM SERPL-MCNC: 9.7 MG/DL (ref 8.5–10.5)
CHLORIDE SERPL-SCNC: 109 MMOL/L (ref 96–112)
CO2 SERPL-SCNC: 24 MMOL/L (ref 20–33)
CREAT SERPL-MCNC: 0.97 MG/DL (ref 0.5–1.4)
EOSINOPHIL # BLD AUTO: 0.35 K/UL (ref 0–0.51)
EOSINOPHIL NFR BLD: 4 % (ref 0–6.9)
ERYTHROCYTE [DISTWIDTH] IN BLOOD BY AUTOMATED COUNT: 43 FL (ref 35.9–50)
GLOBULIN SER CALC-MCNC: 3.3 G/DL (ref 1.9–3.5)
GLUCOSE SERPL-MCNC: 109 MG/DL (ref 65–99)
HCT VFR BLD AUTO: 43.6 % (ref 37–47)
HGB BLD-MCNC: 14 G/DL (ref 12–16)
IMM GRANULOCYTES # BLD AUTO: 0.04 K/UL (ref 0–0.11)
IMM GRANULOCYTES NFR BLD AUTO: 0.5 % (ref 0–0.9)
LIPASE SERPL-CCNC: 17 U/L (ref 11–82)
LYMPHOCYTES # BLD AUTO: 2.96 K/UL (ref 1–4.8)
LYMPHOCYTES NFR BLD: 33.9 % (ref 22–41)
MCH RBC QN AUTO: 30.4 PG (ref 27–33)
MCHC RBC AUTO-ENTMCNC: 32.1 G/DL (ref 33.6–35)
MCV RBC AUTO: 94.8 FL (ref 81.4–97.8)
MONOCYTES # BLD AUTO: 0.69 K/UL (ref 0–0.85)
MONOCYTES NFR BLD AUTO: 7.9 % (ref 0–13.4)
NEUTROPHILS # BLD AUTO: 4.63 K/UL (ref 2–7.15)
NEUTROPHILS NFR BLD: 53 % (ref 44–72)
NRBC # BLD AUTO: 0 K/UL
NRBC BLD-RTO: 0 /100 WBC
PLATELET # BLD AUTO: 246 K/UL (ref 164–446)
PMV BLD AUTO: 12.3 FL (ref 9–12.9)
POTASSIUM SERPL-SCNC: 4.1 MMOL/L (ref 3.6–5.5)
PROT SERPL-MCNC: 7.5 G/DL (ref 6–8.2)
RBC # BLD AUTO: 4.6 M/UL (ref 4.2–5.4)
SODIUM SERPL-SCNC: 142 MMOL/L (ref 135–145)
WBC # BLD AUTO: 8.7 K/UL (ref 4.8–10.8)

## 2021-05-11 PROCEDURE — 36415 COLL VENOUS BLD VENIPUNCTURE: CPT

## 2021-05-11 PROCEDURE — 82150 ASSAY OF AMYLASE: CPT

## 2021-05-11 PROCEDURE — 80053 COMPREHEN METABOLIC PANEL: CPT

## 2021-05-11 PROCEDURE — 83690 ASSAY OF LIPASE: CPT

## 2021-05-11 PROCEDURE — 85025 COMPLETE CBC W/AUTO DIFF WBC: CPT

## 2021-08-16 ENCOUNTER — HOSPITAL ENCOUNTER (OUTPATIENT)
Dept: RADIOLOGY | Facility: MEDICAL CENTER | Age: 46
End: 2021-08-16
Attending: PHYSICIAN ASSISTANT
Payer: COMMERCIAL

## 2021-08-16 DIAGNOSIS — R07.89 OTHER CHEST PAIN: ICD-10-CM

## 2021-08-16 PROCEDURE — A9270 NON-COVERED ITEM OR SERVICE: HCPCS | Performed by: PHYSICIAN ASSISTANT

## 2021-08-16 PROCEDURE — 700112 HCHG RX REV CODE 229: Performed by: PHYSICIAN ASSISTANT

## 2021-08-16 PROCEDURE — 74246 X-RAY XM UPR GI TRC 2CNTRST: CPT

## 2021-08-16 RX ADMIN — ANTACID/ANTIFLATULENT 1 PACKET: 380; 550; 10; 10 GRANULE, EFFERVESCENT ORAL at 08:30

## 2021-09-23 ENCOUNTER — IMMUNIZATION (OUTPATIENT)
Dept: OCCUPATIONAL MEDICINE | Facility: CLINIC | Age: 46
End: 2021-09-23
Payer: COMMERCIAL

## 2021-09-23 DIAGNOSIS — Z23 NEED FOR VACCINATION: Primary | ICD-10-CM

## 2021-10-01 PROCEDURE — 90686 IIV4 VACC NO PRSV 0.5 ML IM: CPT | Performed by: PREVENTIVE MEDICINE

## 2021-12-08 ENCOUNTER — PHARMACY VISIT (OUTPATIENT)
Dept: PHARMACY | Facility: MEDICAL CENTER | Age: 46
End: 2021-12-08
Payer: COMMERCIAL

## 2021-12-08 PROCEDURE — RXMED WILLOW AMBULATORY MEDICATION CHARGE: Performed by: INTERNAL MEDICINE

## 2021-12-08 RX ORDER — RNA INGREDIENT BNT-162B2 0.23 G/1.8ML
0.3 INJECTION, SUSPENSION INTRAMUSCULAR
Qty: 0.3 ML | Refills: 0 | Status: SHIPPED | OUTPATIENT
Start: 2021-12-08 | End: 2022-04-12

## 2022-01-06 ENCOUNTER — OFFICE VISIT (OUTPATIENT)
Dept: URGENT CARE | Facility: PHYSICIAN GROUP | Age: 47
End: 2022-01-06
Payer: COMMERCIAL

## 2022-01-06 ENCOUNTER — HOSPITAL ENCOUNTER (OUTPATIENT)
Facility: MEDICAL CENTER | Age: 47
End: 2022-01-06
Attending: FAMILY MEDICINE
Payer: COMMERCIAL

## 2022-01-06 VITALS
HEIGHT: 66 IN | RESPIRATION RATE: 18 BRPM | WEIGHT: 260 LBS | TEMPERATURE: 97.1 F | BODY MASS INDEX: 41.78 KG/M2 | HEART RATE: 116 BPM | OXYGEN SATURATION: 97 % | DIASTOLIC BLOOD PRESSURE: 96 MMHG | SYSTOLIC BLOOD PRESSURE: 126 MMHG

## 2022-01-06 DIAGNOSIS — T36.95XA ANTIBIOTIC-INDUCED YEAST INFECTION: ICD-10-CM

## 2022-01-06 DIAGNOSIS — B37.9 ANTIBIOTIC-INDUCED YEAST INFECTION: ICD-10-CM

## 2022-01-06 DIAGNOSIS — Z20.822 SUSPECTED COVID-19 VIRUS INFECTION: ICD-10-CM

## 2022-01-06 DIAGNOSIS — H66.002 NON-RECURRENT ACUTE SUPPURATIVE OTITIS MEDIA OF LEFT EAR WITHOUT SPONTANEOUS RUPTURE OF TYMPANIC MEMBRANE: ICD-10-CM

## 2022-01-06 PROCEDURE — U0003 INFECTIOUS AGENT DETECTION BY NUCLEIC ACID (DNA OR RNA); SEVERE ACUTE RESPIRATORY SYNDROME CORONAVIRUS 2 (SARS-COV-2) (CORONAVIRUS DISEASE [COVID-19]), AMPLIFIED PROBE TECHNIQUE, MAKING USE OF HIGH THROUGHPUT TECHNOLOGIES AS DESCRIBED BY CMS-2020-01-R: HCPCS

## 2022-01-06 PROCEDURE — U0005 INFEC AGEN DETEC AMPLI PROBE: HCPCS

## 2022-01-06 PROCEDURE — 99213 OFFICE O/P EST LOW 20 MIN: CPT | Mod: CS | Performed by: FAMILY MEDICINE

## 2022-01-06 RX ORDER — FLUCONAZOLE 100 MG/1
100 TABLET ORAL DAILY
Qty: 2 TABLET | Refills: 0 | Status: SHIPPED | OUTPATIENT
Start: 2022-01-06 | End: 2022-04-12

## 2022-01-06 RX ORDER — AMOXICILLIN 875 MG/1
875 TABLET, COATED ORAL 2 TIMES DAILY
Qty: 20 TABLET | Refills: 0 | Status: SHIPPED | OUTPATIENT
Start: 2022-01-06 | End: 2022-01-16

## 2022-01-06 ASSESSMENT — ENCOUNTER SYMPTOMS
VOMITING: 0
COUGH: 1
FEVER: 0

## 2022-01-06 ASSESSMENT — FIBROSIS 4 INDEX: FIB4 SCORE: 1.01

## 2022-01-06 NOTE — LETTER
January 6, 2022    To Whom It May Concern:         This is confirmation that Balbina Randall attended her scheduled appointment with Ronn Zuleta M.D. on 1/06/22.  Patient tested for COVID-19 with PCR testing.  If test results were to be positive, patient must remain on quarantine until 5 days have passed since onset of symptoms and symptoms are also resolving/patient is asymptomatic (cleared to go back to work starting 1/8/22 if feeling better).  Thank you for making appropriate accommodations as they recover.         If you have any questions please do not hesitate to call me at the phone number listed below.    Sincerely,          Ronn Zuleta M.D.  [phone]

## 2022-01-07 DIAGNOSIS — Z20.822 SUSPECTED COVID-19 VIRUS INFECTION: ICD-10-CM

## 2022-01-07 LAB — COVID ORDER STATUS COVID19: NORMAL

## 2022-01-07 NOTE — PROGRESS NOTES
Subjective:     Balbina Randall is a 46 y.o. female who presents for Otalgia (x 2 days) and Sinus Problem    HPI  Pt presents for evaluation of an acute problem  Patient with sinus pressure and ear pain for the past 2 days  Started with nasal congestion and symptoms were mild   Progressed and now having more sinus pressure   Having mild cough   Has not had any fevers   No N/V/D   Multiple contacts with COVID recently     Review of Systems   Constitutional: Negative for fever.   HENT: Positive for congestion and ear pain.    Respiratory: Positive for cough.    Gastrointestinal: Negative for vomiting.   Skin: Negative for rash.     PMH:  has a past medical history of Airway problem (1/19/15), Heart burn, Indigestion, Other specified symptom associated with female genital organs, Harleen-Parkinson-White syndrome, and WPW (Harleen-Parkinson-White syndrome) (since birth).  MEDS:   Current Outpatient Medications:   •  COVID-19 mRNA Vaccine, Pfizer, (PFIZER-BIONTECH COVID-19 VACC) 30 MCG/0.3ML Suspension injection, Inject 0.3 mL into the shoulder, thigh, or buttocks., Disp: 0.3 mL, Rfl: 0  •  gabapentin (NEURONTIN) 100 MG Cap, , Disp: , Rfl:   •  ketorolac (TORADOL) 10 MG Tab, , Disp: , Rfl:   •  meloxicam (MOBIC) 15 MG tablet, , Disp: , Rfl:   •  sucralfate (CARAFATE) 1 GM/10ML Suspension, , Disp: , Rfl:   •  predniSONE 10 MG (21) Tablet Therapy Pack, Take 60 mg by mouth 1 time a day as needed. 6 day taper - begin with 60 mg daily, decrease by 10 mg every day until completed, Disp: 21 Each, Rfl: 0  •  amoxicillin (AMOXIL) 875 MG tablet, Take 1 Tab by mouth 2 times a day., Disp: 20 Tab, Rfl: 0  •  ondansetron (ZOFRAN) 4 MG Tab tablet, Take 1 Tab by mouth every four hours as needed. (Patient not taking: Reported on 4/20/2020), Disp: 12 Tab, Rfl: 0  •  celecoxib (CELEBREX) 200 MG Cap, Take 200 mg by mouth 2 times a day., Disp: , Rfl:   •  pantoprazole (PROTONIX) 40 MG Tablet Delayed Response, , Disp: , Rfl:   •   hyoscyamine (LEVSIN) 0.125 MG SL Tab, , Disp: , Rfl:   ALLERGIES:   Allergies   Allergen Reactions   • Latex Rash     LTH=9770   • Other Drug Rash     Allergic to Estrogen IM - preservative within medication  WSS=3261   • Sulfa Drugs Rash and Itching     WXP=1965   • Sulfadiazine      Reaction: ITCHING Create Date: [id number] Create User Name: Lindsay Alvarez Update Date: [id number] Update User Name: Lindsay Alvarez     SURGHX:   Past Surgical History:   Procedure Laterality Date   • IA SHLDR ARTHROSCOP,PART ACROMIOPLAS Right 1/29/2020    Procedure: DECOMPRESSION, SHOULDER, ARTHROSCOPIC - SUBACROMIAL, EXTENSIVE DEBRIDEMENT;  Surgeon: Robert Park M.D.;  Location: [facility];  Service: Orthopedics   • CLAVICLE DISTAL EXCISION Right 1/29/2020    Procedure: EXCISION, CLAVICLE, DISTAL;  Surgeon: Robert Park M.D.;  Location: [facility];  Service: Orthopedics   • ROTATOR CUFF REPAIR Right 1/29/2020    Procedure: REPAIR, ROTATOR CUFF;  Surgeon: Robert Park M.D.;  Location: [facility];  Service: Orthopedics   • HYSTEROSCOPY NOVASURE-2  4/27/2017    Procedure: HYSTEROSCOPY NOVASURE;  Surgeon: Lindsay Webb M.D.;  Location: SURGERY SAME DAY [facility];  Service:    • WRIST ARTHROSCOPY Right 1/13/2016    Procedure: WRIST ARTHROSCOPY with debridment  ;  Surgeon: Jose Daniel Redmond M.D.;  Location: SURGERY [facility];  Service:    • LIGAMENT REPAIR  1/13/2016    Procedure: LIGAMENT REPAIR;  Surgeon: Jose Daniel Redmond M.D.;  Location: SURGERY [facility];  Service:    • COLONOSCOPY WITH BIOPSY  1/26/2015    Performed by Mauricio Serrato M.D. at [facility]   • GASTROSCOPY WITH BIOPSY  1/26/2015    Performed by Mauricio Serrato M.D. at [facility]   • HYSTEROSCOPY WITH VIDEO DIAGNOSTIC  5/24/2013    Performed by Robert Rosario M.D. at [facility]   • KIRT BY LAPAROSCOPY  9/2/2010    Performed by KIM ZHAO  "at SURGERY [facility]   • PRIMARY C SECTION  4/30/2010    Performed by DAXA MILLAN at [facility]OR [facility] DELIVERY   • BOWEL RESECTION  1992    \"adhesions\"   • APPENDECTOMY  1992   • LAPAROSCOPY  1992, 2002   • OTHER      kenalog inj left foot     SOCHX:  reports that she has never smoked. She has never used smokeless tobacco. She reports current alcohol use. She reports that she does not use drugs.     Objective:   /96   Pulse (!) 116   Temp 36.2 °C (97.1 °F) (Temporal)   Resp 18   Ht 1.676 m (5' 6\")   Wt 118 kg (260 lb)   SpO2 97%   BMI 41.97 kg/m²     Physical Exam  Constitutional:       General: She is not in acute distress.     Appearance: She is well-developed. She is not diaphoretic.   HENT:      Head: Normocephalic and atraumatic.      Right Ear: Tympanic membrane, ear canal and external ear normal.      Left Ear: Ear canal and external ear normal.      Ears:      Comments: Left tympanic membrane erythematous with large purulent effusion present, no perforation appreciated     Nose: Congestion and rhinorrhea present.      Mouth/Throat:      Mouth: Mucous membranes are moist.      Pharynx: Oropharynx is clear. No oropharyngeal exudate or posterior oropharyngeal erythema.   Neck:      Trachea: No tracheal deviation.   Cardiovascular:      Rate and Rhythm: Normal rate and regular rhythm.   Pulmonary:      Effort: Pulmonary effort is normal. No respiratory distress.      Breath sounds: Normal breath sounds. No wheezing or rales.   Musculoskeletal:      Cervical back: Normal range of motion and neck supple. No tenderness.   Lymphadenopathy:      Cervical: No cervical adenopathy.   Skin:     General: Skin is warm and dry.      Findings: No rash.   Neurological:      Mental Status: She is alert.       Assessment/Plan:   Assessment    1. Non-recurrent acute suppurative otitis media of left ear without spontaneous rupture of tympanic membrane  - amoxicillin (AMOXIL) 875 MG tablet; Take 1 Tablet by mouth 2 " times a day for 10 days.  Dispense: 20 Tablet; Refill: 0    2. Antibiotic-induced yeast infection  - fluconazole (DIFLUCAN) 100 MG Tab; Take 1 Tablet by mouth every day.  Dispense: 2 Tablet; Refill: 0    3. Suspected COVID-19 virus infection  - SARS-CoV-2 PCR (24 hour In-House): Collect NP swab in VTM; Future    Patient with COVID-19 versus viral URI.  Also has left-sided ear infection.  Will treat with amoxicillin.  Patient does not have any findings which indicate need for hospitalization, and will be managed on outpatient basis.  Will send COVID-19 PCR testing. Reviewed home isolation protocol, medication use for symptomatic management, and follow-up precautions if symptoms should worsen.

## 2022-01-08 LAB
SARS-COV-2 RNA RESP QL NAA+PROBE: DETECTED
SPECIMEN SOURCE: ABNORMAL

## 2022-04-12 ENCOUNTER — OFFICE VISIT (OUTPATIENT)
Dept: URGENT CARE | Facility: PHYSICIAN GROUP | Age: 47
End: 2022-04-12
Payer: COMMERCIAL

## 2022-04-12 VITALS
DIASTOLIC BLOOD PRESSURE: 72 MMHG | RESPIRATION RATE: 18 BRPM | BODY MASS INDEX: 41.78 KG/M2 | HEART RATE: 88 BPM | TEMPERATURE: 97.5 F | HEIGHT: 66 IN | SYSTOLIC BLOOD PRESSURE: 114 MMHG | WEIGHT: 260 LBS | OXYGEN SATURATION: 99 %

## 2022-04-12 DIAGNOSIS — M54.42 ACUTE LEFT-SIDED LOW BACK PAIN WITH LEFT-SIDED SCIATICA: ICD-10-CM

## 2022-04-12 PROCEDURE — 99213 OFFICE O/P EST LOW 20 MIN: CPT | Performed by: NURSE PRACTITIONER

## 2022-04-12 RX ORDER — OXYCODONE HYDROCHLORIDE AND ACETAMINOPHEN 5; 325 MG/1; MG/1
1 TABLET ORAL EVERY 4 HOURS PRN
Qty: 10 TABLET | Refills: 0 | Status: SHIPPED | OUTPATIENT
Start: 2022-04-12 | End: 2022-04-14

## 2022-04-12 RX ORDER — TIZANIDINE 4 MG/1
4 TABLET ORAL EVERY 6 HOURS PRN
Qty: 30 TABLET | Refills: 3 | Status: SHIPPED | OUTPATIENT
Start: 2022-04-12 | End: 2022-05-16

## 2022-04-12 RX ORDER — PREDNISONE 20 MG/1
40 TABLET ORAL DAILY
Qty: 10 TABLET | Refills: 0 | Status: SHIPPED | OUTPATIENT
Start: 2022-04-12 | End: 2022-04-18

## 2022-04-12 ASSESSMENT — FIBROSIS 4 INDEX: FIB4 SCORE: 1.03

## 2022-04-12 ASSESSMENT — PAIN SCALES - GENERAL: PAINLEVEL: 8=MODERATE-SEVERE PAIN

## 2022-04-13 ASSESSMENT — ENCOUNTER SYMPTOMS
FALLS: 0
MYALGIAS: 1
ABDOMINAL PAIN: 0
FLANK PAIN: 0
CHILLS: 0
NECK PAIN: 0
NAUSEA: 0
FEVER: 0
DIARRHEA: 0
SENSORY CHANGE: 0
BACK PAIN: 1
WEAKNESS: 1
VOMITING: 0

## 2022-04-13 NOTE — PROGRESS NOTES
Subjective:     Balbina Randall is a 47 y.o. female who presents for Back Pain (For 2 days. )      Back Pain  Associated symptoms include weakness. Pertinent negatives include no abdominal pain, dysuria or fever.     Pt presents for evaluation of a new problem. Balbina is a pleasant 47 year old female who presents to  today with complaints of left lower back pain that started yesterday. She states she awoke in the morning yesterday with complaints of mild back pain that significantly worsened today. Her pain increases to a 8/10 with certain movements, but rates it as a constant 5/10. She notes severe pain that radiates down to her left thigh. She does have a history of low back pain with sciatica that she has treated with Toradol and Meloxicam. She did take both antiinflammatories today. This provided mild relief. She notes she has also had steroid injections with her last in 2020. She notes no recent mechanism of injury to cause this flare up.     Review of Systems   Constitutional: Negative for chills and fever.   Gastrointestinal: Negative for abdominal pain, diarrhea, nausea and vomiting.   Genitourinary: Negative for dysuria, flank pain, frequency, hematuria and urgency.   Musculoskeletal: Positive for back pain and myalgias. Negative for falls, joint pain and neck pain.   Neurological: Positive for weakness. Negative for sensory change.       PMH:   Past Medical History:   Diagnosis Date   • Airway problem 1/19/15    abnormal airway assessment as noted on procedure orders per MD (Dr. Serrato)   • Heart burn    • Indigestion    • Other specified symptom associated with female genital organs     ovarian cyst, adhesions   • Harleen-Parkinson-White syndrome    • WPW (Harleen-Parkinson-White syndrome) since birth    cardiologist, Dr. So     ALLERGIES:   Allergies   Allergen Reactions   • Latex Rash     [id number]   • Other Drug Rash     Allergic to Estrogen IM - preservative within medication  [id number]  Subjective: Lolis Jara is a 37 y.o. male presenting for annual exam and complete physical.    There is no problem list on file for this patient. There are no active problems to display for this patient.       Allergies   Allergen Reactions    Codeine Rash     Past Medical History:   Diagnosis Date    GERD (gastroesophageal reflux disease)     Mitral valve prolapse      Past Surgical History:   Procedure Laterality Date    HX ORTHOPAEDIC      right wrist pinning     Family History   Problem Relation Age of Onset    Arthritis-osteo Mother     Hypertension Father     Elevated Lipids Father     Arthritis-osteo Sister      Social History   Substance Use Topics    Smoking status: Not on file    Smokeless tobacco: Never Used    Alcohol use No      Comment: occ             Review of Systems  Constitutional: negative  Eyes: negative  Ears, nose, mouth, throat, and face: negative  Respiratory: positive for cough or sputum  Cardiovascular: negative  Gastrointestinal: negative  Genitourinary:positive for frequency  Integument/breast: negative  Hematologic/lymphatic: negative  Musculoskeletal:positive for bone pain and hand deformity  Neurological: negative  Behavioral/Psych: negative    Objective:     Visit Vitals    /82 (BP 1 Location: Left arm, BP Patient Position: Sitting)    Pulse 90    Temp 98.8 °F (37.1 °C) (Oral)    Resp 28    Ht 5' 10\" (1.778 m)    Wt 162 lb (73.5 kg)    SpO2 99%    BMI 23.24 kg/m2     Physical exam:   General appearance - alert, well appearing, and in no distress  Mental status - alert, oriented to person, place, and time  Eyes - pupils equal and reactive, extraocular eye movements intact  Ears - bilateral TM's and external ear canals normal  Nose - normal and patent, no erythema, discharge or polyps  Mouth - mucous membranes moist, pharynx normal without lesions  Neck - supple, no significant adenopathy, carotids upstroke normal bilaterally, no bruits, thyroid "  • Sulfa Drugs Rash and Itching     [id number]   • Sulfadiazine      Reaction: ITCHING Create Date: [id number] Create User Name: Lindsay Alvarez Update Date: [id number] Update User Name: Lindsay Alvarez     SURGHX:   Past Surgical History:   Procedure Laterality Date   • HI SHLDR ARTHROSCOP,PART ACROMIOPLAS Right 1/29/2020    Procedure: DECOMPRESSION, SHOULDER, ARTHROSCOPIC - SUBACROMIAL, EXTENSIVE DEBRIDEMENT;  Surgeon: Robert Park M.D.;  Location: [facility];  Service: Orthopedics   • CLAVICLE DISTAL EXCISION Right 1/29/2020    Procedure: EXCISION, CLAVICLE, DISTAL;  Surgeon: Robert Park M.D.;  Location: [facility];  Service: Orthopedics   • ROTATOR CUFF REPAIR Right 1/29/2020    Procedure: REPAIR, ROTATOR CUFF;  Surgeon: Robert Park M.D.;  Location: [facility];  Service: Orthopedics   • HYSTEROSCOPY NOVASURE-2  4/27/2017    Procedure: HYSTEROSCOPY NOVASURE;  Surgeon: Daxa Webb M.D.;  Location: SURGERY [facility] DAY [facility];  Service:    • WRIST ARTHROSCOPY Right 1/13/2016    Procedure: WRIST ARTHROSCOPY with debridment  ;  Surgeon: Jose aDniel Redmond M.D.;  Location: [facility];  Service:    • LIGAMENT REPAIR  1/13/2016    Procedure: LIGAMENT REPAIR;  Surgeon: Jose Daniel Redmond M.D.;  Location: [facility];  Service:    • COLONOSCOPY WITH BIOPSY  1/26/2015    Performed by Mauricio Serrato M.D. at [facility]   • GASTROSCOPY WITH BIOPSY  1/26/2015    Performed by Mauricio Serrato M.D. at [facility]   • HYSTEROSCOPY WITH VIDEO DIAGNOSTIC  5/24/2013    Performed by Robert Rosario M.D. at [facility]   • KIRT BY LAPAROSCOPY  9/2/2010    Performed by KIM ZHAO at [facility]   • PRIMARY C SECTION  4/30/2010    Performed by DAXA WEBB at [facility]   • BOWEL RESECTION  1992    \"adhesions\"   • APPENDECTOMY  1992   • LAPAROSCOPY  1992, 2002   • OTHER   " exam: thyroid is normal in size without nodules or tenderness  Chest - clear to auscultation, no wheezes, rales or rhonchi, symmetric air entry  Heart - normal rate, regular rhythm, normal S1, S2, no murmurs, rubs, clicks or gallops  Abdomen - soft, nontender, nondistended, no masses or organomegaly  Rectal - negative without mass, lesions or tenderness, stool guaiac negative, PROSTATE EXAM: smooth and symmetric without nodules or tenderness  Musculoskeletal - no joint tenderness,posttraumatic deformity of ring and small fingers rt hand noted or swelling  Extremities - peripheral pulses normal, no pedal edema, no clubbing or cyanosis  Skin - normal coloration and turgor, no rashes, no suspicious skin lesions noted     Assessment/Plan:     Well Male Exam    continue present plan, routine labs ordered, call if any problems. Diagnoses and all orders for this visit:    1. Annual physical exam  -     METABOLIC PANEL, COMPREHENSIVE  -     CBC WITH AUTOMATED DIFF  -     CHOLESTEROL, TOTAL  -     AMB POC URINALYSIS DIP STICK AUTO W/O MICRO    2. Chronic cough,excellent Peak Flow  -     fluticasone (FLONASE) 50 mcg/actuation nasal spray; 2 Sprays by Both Nostrils route daily for 360 days. -     cetirizine (ZYRTEC) 10 mg tablet; Take 1 Tab by mouth nightly. -     XR CHEST PA LAT; Future      Follow-up Disposition: Not on File. "   kenalog inj left foot     SOCHX:   Social History     Socioeconomic History   • Marital status:    Tobacco Use   • Smoking status: Never Smoker   • Smokeless tobacco: Never Used   Vaping Use   • Vaping Use: Never used   Substance and Sexual Activity   • Alcohol use: Yes     Alcohol/week: 0.0 oz     Comment: 3-4 month   • Drug use: No     FH:   Family History   Problem Relation Age of Onset   • Hypertension Other    • Cancer Other          Objective:   /72   Pulse 88   Temp 36.4 °C (97.5 °F) (Temporal)   Resp 18   Ht 1.676 m (5' 6\")   Wt 118 kg (260 lb)   SpO2 99%   BMI 41.97 kg/m²     Physical Exam  Vitals and nursing note reviewed.   Constitutional:       General: She is not in acute distress.     Appearance: Normal appearance. She is not ill-appearing.   HENT:      Head: Normocephalic and atraumatic.      Right Ear: External ear normal.      Left Ear: External ear normal.      Nose: No congestion or rhinorrhea.      Mouth/Throat:      Mouth: Mucous membranes are moist.   Eyes:      Extraocular Movements: Extraocular movements intact.      Pupils: Pupils are equal, round, and reactive to light.   Cardiovascular:      Rate and Rhythm: Normal rate and regular rhythm.      Pulses: Normal pulses.      Heart sounds: Normal heart sounds.   Pulmonary:      Effort: Pulmonary effort is normal.      Breath sounds: Normal breath sounds.   Abdominal:      General: Abdomen is flat. Bowel sounds are normal.      Palpations: Abdomen is soft.      Tenderness: There is no abdominal tenderness. There is no right CVA tenderness or left CVA tenderness.   Musculoskeletal:      Cervical back: Normal range of motion and neck supple.      Lumbar back: Spasms and tenderness present. No swelling, edema, deformity, signs of trauma, lacerations or bony tenderness. Decreased range of motion. No scoliosis.        Back:       Comments: Pt unable to get on exam table for PE. Positive for antalgic gait.    Skin:     General: " Skin is warm and dry.      Capillary Refill: Capillary refill takes less than 2 seconds.   Neurological:      General: No focal deficit present.      Mental Status: She is alert and oriented to person, place, and time. Mental status is at baseline.   Psychiatric:         Mood and Affect: Mood normal.         Behavior: Behavior normal.         Thought Content: Thought content normal.         Judgment: Judgment normal.       POCT urine: normal  Assessment/Plan:   Assessment    1. Acute left-sided low back pain with left-sided sciatica  tizanidine (ZANAFLEX) 4 MG Tab    Referral to Pain Clinic    predniSONE (DELTASONE) 20 MG Tab    oxyCODONE-acetaminophen (PERCOCET) 5-325 MG Tab   Unable to give Toradol injection in clinic today as she did take both Meloxicam and Toradol PO. Pt educated on harmful effects of excessive antiinflammatories. She was prescribed both Zanaflex and percocet for relief of her pain. She was strongly recommended not to take these medications concurrently. She verbalizes understanding.Pt urgently referred to follow up with physiatry.   Rest, ice, heat an d massage encouraged.   AVS handout given and reviewed with patient. Pt educated on red flags and when to seek treatment back in ER or UC.

## 2022-04-14 ENCOUNTER — HOSPITAL ENCOUNTER (OUTPATIENT)
Dept: RADIOLOGY | Facility: MEDICAL CENTER | Age: 47
End: 2022-04-14
Attending: PHYSICAL MEDICINE & REHABILITATION
Payer: COMMERCIAL

## 2022-04-14 ENCOUNTER — OFFICE VISIT (OUTPATIENT)
Dept: PHYSICAL MEDICINE AND REHAB | Facility: MEDICAL CENTER | Age: 47
End: 2022-04-14
Payer: COMMERCIAL

## 2022-04-14 ENCOUNTER — TELEPHONE (OUTPATIENT)
Dept: PHYSICAL MEDICINE AND REHAB | Facility: MEDICAL CENTER | Age: 47
End: 2022-04-14

## 2022-04-14 VITALS
DIASTOLIC BLOOD PRESSURE: 84 MMHG | BODY MASS INDEX: 42.55 KG/M2 | OXYGEN SATURATION: 95 % | HEIGHT: 66 IN | WEIGHT: 264.77 LBS | TEMPERATURE: 97.3 F | HEART RATE: 74 BPM | SYSTOLIC BLOOD PRESSURE: 138 MMHG

## 2022-04-14 DIAGNOSIS — M25.559 CHRONIC HIP PAIN, UNSPECIFIED LATERALITY: ICD-10-CM

## 2022-04-14 DIAGNOSIS — Z78.9 IMPAIRED MOBILITY AND ADLS: ICD-10-CM

## 2022-04-14 DIAGNOSIS — M54.16 LUMBAR RADICULOPATHY: ICD-10-CM

## 2022-04-14 DIAGNOSIS — Z74.09 IMPAIRED PHYSICAL MOBILITY: ICD-10-CM

## 2022-04-14 DIAGNOSIS — Z74.09 IMPAIRED MOBILITY AND ADLS: ICD-10-CM

## 2022-04-14 DIAGNOSIS — G89.29 CHRONIC HIP PAIN, UNSPECIFIED LATERALITY: ICD-10-CM

## 2022-04-14 PROCEDURE — 72148 MRI LUMBAR SPINE W/O DYE: CPT

## 2022-04-14 PROCEDURE — 73522 X-RAY EXAM HIPS BI 3-4 VIEWS: CPT

## 2022-04-14 PROCEDURE — 99204 OFFICE O/P NEW MOD 45 MIN: CPT | Performed by: PHYSICAL MEDICINE & REHABILITATION

## 2022-04-14 PROCEDURE — 72100 X-RAY EXAM L-S SPINE 2/3 VWS: CPT

## 2022-04-14 RX ORDER — ESTRADIOL 10 UG/1
INSERT VAGINAL
COMMUNITY
Start: 2022-03-21

## 2022-04-14 RX ORDER — ACETAMINOPHEN 500 MG
1000 TABLET ORAL 3 TIMES DAILY PRN
Qty: 180 TABLET | Refills: 6 | Status: SHIPPED | OUTPATIENT
Start: 2022-04-14

## 2022-04-14 RX ORDER — GABAPENTIN 300 MG/1
300-600 CAPSULE ORAL 3 TIMES DAILY PRN
Qty: 180 CAPSULE | Refills: 3 | Status: SHIPPED | OUTPATIENT
Start: 2022-04-14 | End: 2022-05-16

## 2022-04-14 ASSESSMENT — PAIN SCALES - GENERAL: PAINLEVEL: 6=MODERATE PAIN

## 2022-04-14 ASSESSMENT — PATIENT HEALTH QUESTIONNAIRE - PHQ9: CLINICAL INTERPRETATION OF PHQ2 SCORE: 0

## 2022-04-14 ASSESSMENT — FIBROSIS 4 INDEX: FIB4 SCORE: 1.03

## 2022-04-14 NOTE — LETTER
April 14, 2022         Patient: Balbina Randall   YOB: 1975   Date of Visit: 4/14/2022           To Whom it May Concern:    Balbina Randall was seen in my clinic on 4/14/2022. Patient is to stay off work for 2 weeks starting 4/14/22 and return on 4/28/22.    If you have any questions or concerns, please don't hesitate to call.        Sincerely,           Davide Bartholomew M.D.  Electronically Signed

## 2022-04-14 NOTE — PROGRESS NOTES
New patient note    Interventional spine and Pain  Physiatry (physical medicine and  Rehabilitation)     Date of service: See epic    Chief complaint:   Chief Complaint   Patient presents with   • New Patient     Back pain        Referring provider: Cece Goodwin A.P.R.    HISTORY    HPI: Balbina Randall 47 y.o.  who presents today with Diagnoses of Lumbar radiculopathy, Impaired mobility and ADLs, Impaired physical mobility, and Chronic hip pain were pertinent to this visit.    HPI    Starting on 4/11/2022 the patient had acute onset pain in the left low back rating to left hip as well as to the left anterior thigh which started upon waking up.  She denies trauma or injury to the area.  This pain is severe in intensity.  Worse with prolonged sitting.  Worse with twisting.  Aching in quality.  Sharp in quality.  Associated numbness.  Constant pain.  10 out of 10 in intensity, the patient was teary-eyed during the exam and history.  There is also worse pain with walking. She has had back pain in the past but not this severe.        Medications tried include toradol, zanaflex, percocet, oral steroids, mobic.       Medical records review:  I reviewed the note from the referring provider Cece Goodwin A.P.R.* including the note dated 4/12/2022.           ROS:   Red Flags ROS:   Fever, Chills, Sweats: Denies  Involuntary Weight Loss: Denies  Bladder Incontinence: Denies  Bowel Incontinence: denies  Saddle Anesthesia: Denies    All other systems reviewed and negative.       PMHx:   Past Medical History:   Diagnosis Date   • Airway problem 1/19/15    abnormal airway assessment as noted on procedure orders per MD (Dr. Serrato)   • Heart burn    • Indigestion    • Other specified symptom associated with female genital organs     ovarian cyst, adhesions   • Harleen-Parkinson-White syndrome    • WPW (Harleen-Parkinson-White syndrome) since birth    cardiologist, Dr. So         Current Outpatient Medications on  File Prior to Visit   Medication Sig Dispense Refill   • estradiol (VAGIFEM) 10 MCG Tab insert one tablet vaginally two times a week     • tizanidine (ZANAFLEX) 4 MG Tab Take 1 Tablet by mouth every 6 hours as needed. 30 Tablet 3   • predniSONE (DELTASONE) 20 MG Tab Take 2 Tablets by mouth every day for 5 days. 10 Tablet 0   • meloxicam (MOBIC) 15 MG tablet      • sucralfate (CARAFATE) 1 GM/10ML Suspension      • pantoprazole (PROTONIX) 40 MG Tablet Delayed Response      • ketorolac (TORADOL) 10 MG Tab  (Patient not taking: Reported on 4/14/2022)       No current facility-administered medications on file prior to visit.        PSHx:   Past Surgical History:   Procedure Laterality Date   • NC SHLDR ARTHROSCOP,PART ACROMIOPLAS Right 1/29/2020    Procedure: DECOMPRESSION, SHOULDER, ARTHROSCOPIC - SUBACROMIAL, EXTENSIVE DEBRIDEMENT;  Surgeon: Robert Park M.D.;  Location: [facility];  Service: Orthopedics   • CLAVICLE DISTAL EXCISION Right 1/29/2020    Procedure: EXCISION, CLAVICLE, DISTAL;  Surgeon: Robert Park M.D.;  Location: [facility];  Service: Orthopedics   • ROTATOR CUFF REPAIR Right 1/29/2020    Procedure: REPAIR, ROTATOR CUFF;  Surgeon: Robert Park M.D.;  Location: [facility];  Service: Orthopedics   • HYSTEROSCOPY NOVASURE-2  4/27/2017    Procedure: HYSTEROSCOPY NOVASURE;  Surgeon: Lindsay Webb M.D.;  Location: [facility] DAY [facility];  Service:    • WRIST ARTHROSCOPY Right 1/13/2016    Procedure: WRIST ARTHROSCOPY with debridment  ;  Surgeon: Jose Daniel Redmond M.D.;  Location: [facility];  Service:    • LIGAMENT REPAIR  1/13/2016    Procedure: LIGAMENT REPAIR;  Surgeon: Jose Daniel Redmond M.D.;  Location: [facility];  Service:    • COLONOSCOPY WITH BIOPSY  1/26/2015    Performed by Mauricio Serrato M.D. at [facility]   • GASTROSCOPY WITH BIOPSY  1/26/2015    Performed by Mauricio Serrato M.D. at  "SURGERY [facility] ORS   • HYSTEROSCOPY WITH VIDEO DIAGNOSTIC  5/24/2013    Performed by Robert Rosario M.D. at SURGERY [facility]   • KIRT BY LAPAROSCOPY  9/2/2010    Performed by KIM ZHAO at SURGERY [facility]   • PRIMARY C SECTION  4/30/2010    Performed by DAXA MILLAN at LABOR AND DELIVERY   • BOWEL RESECTION  1992    \"adhesions\"   • APPENDECTOMY  1992   • LAPAROSCOPY  1992, 2002   • OTHER      kenalog inj left foot       Family history   Family History   Problem Relation Age of Onset   • Hypertension Other    • Cancer Other          Medications: reviewed on epic.   Outpatient Medications Marked as Taking for the 4/14/22 encounter (Office Visit) with Davide Bartholomew M.D.   Medication Sig Dispense Refill   • estradiol (VAGIFEM) 10 MCG Tab insert one tablet vaginally two times a week     • gabapentin (NEURONTIN) 300 MG Cap Take 1-2 Capsules by mouth 3 times a day as needed (pain). 180 Capsule 3   • acetaminophen (TYLENOL) 500 MG Tab Take 2 Tablets by mouth 3 times a day as needed (pain.  Do not exceed 3000 mg of total acetaminophen per day). 180 Tablet 6   • tizanidine (ZANAFLEX) 4 MG Tab Take 1 Tablet by mouth every 6 hours as needed. 30 Tablet 3   • predniSONE (DELTASONE) 20 MG Tab Take 2 Tablets by mouth every day for 5 days. 10 Tablet 0   • meloxicam (MOBIC) 15 MG tablet      • sucralfate (CARAFATE) 1 GM/10ML Suspension      • pantoprazole (PROTONIX) 40 MG Tablet Delayed Response           Allergies:   Allergies   Allergen Reactions   • Latex Rash     [id number]   • Other Drug Rash     Allergic to Estrogen IM - preservative within medication  [id number]   • Sulfa Drugs Rash and Itching     [id number]   • Sulfadiazine      Reaction: ITCHING Create Date: [id number] Create User Name: Lindsay Alvarez Update Date: [id number] Update User Name: Lindsay Alvarez       Social Hx:   Social History     Socioeconomic History   • Marital status:      Spouse name: Not on file   • Number of " "children: Not on file   • Years of education: Not on file   • Highest education level: Not on file   Occupational History   • Not on file   Tobacco Use   • Smoking status: Never Smoker   • Smokeless tobacco: Never Used   Vaping Use   • Vaping Use: Never used   Substance and Sexual Activity   • Alcohol use: Yes     Alcohol/week: 0.0 oz     Comment: 3-4 month   • Drug use: No   • Sexual activity: Not on file   Other Topics Concern   • Not on file   Social History Narrative   • Not on file     Social Determinants of Health     Financial Resource Strain: Not on file   Food Insecurity: Not on file   Transportation Needs: Not on file   Physical Activity: Not on file   Stress: Not on file   Social Connections: Not on file   Intimate Partner Violence: Not on file   Housing Stability: Not on file         EXAMINATION     Physical Exam:   Vitals: /84 (BP Location: Left leg, Patient Position: Sitting, BP Cuff Size: Large adult)   Pulse 74   Temp 36.3 °C (97.3 °F) (Temporal)   Ht 1.676 m (5' 6\")   Wt 120 kg (264 lb 12.4 oz)   SpO2 95%     Constitutional:   Body Habitus: Body mass index is 42.74 kg/m².  Cooperation: Fully cooperates with exam  Appearance: Well-groomed, well-nourished, not disheveled     Eyes: No scleral icterus to suggest severe liver disease, no proptosis to suggest severe hyperthyroid    ENT -no obvious auditory deficits, no obvious tongue lesions, tongue midline, no facial droop     Skin -no rashes or lesions noted     Respiratory-  breathing comfortable on room air, no audible wheezing    Cardiovascular- capillary refills less than 2 seconds.     Psychiatric- alert and oriented ×3. Normal affect.     Musculoskeletal and Neuro -     The exam is overall limited given the patient's severe pain and decreased mobility.    Thoracic/Lumbar Spine/Sacral Spine/Hips   Inspection: No evidence of atrophy in bilateral lower extremities throughout     ROM: decreased active range of motion with flexion, lateral " flexion, and rotation bilaterally.   There is decreased active range of motion with lumbar extension with pain.    There is pain with facet loading maneuver (extension rotation) with axial low back pain on the BILATERAL side(s)    Palpation:   Palpatory exam is challenging given body habitus.  No areas of high-grade tenderness.    Lumbar spine Special tests  Neuro tension  Straight leg raise and slump test are severely positive even at 5 degrees with causing severe pain radiating down the leg.    HIP  FAIR test negative right, positive left    Range of motion in the RIGHT hip is full  in flexion, extension, abduction, internal rotation, external rotation.  Range of motion in the LEFT hip is decreased in flexion, extension, abduction, internal rotation, external rotation.      SI joint tests  Unable to evaluate SI joint maneuvers because of severity of patient's pain        Key points for the international standards for neurological classification of spinal cord injury (ISNCSCI) to light touch.     Dermatome R L                                      L2 2 2   L3 2 2   L4 2 2   L5 2 2   S1 2 2   S2 2 2       Motor Exam Lower Extremities    ? Myotome R L   Hip flexion L2 5 *   Knee extension L3 5 *   Ankle dorsiflexion L4 5 *   Toe extension L5 5 *   Ankle plantarflexion S1 5 *     Unable to complete the strength exam given the severity of the patient's pain however there may be neurological weakness    Reflexes  Unable to test for reflexes given the severity of the patient's pain.      MEDICAL DECISION MAKING    Medical records review: see under HPI section.     DATA    Labs:   Lab Results   Component Value Date/Time    SODIUM 142 05/11/2021 05:10 PM    POTASSIUM 4.1 05/11/2021 05:10 PM    CHLORIDE 109 05/11/2021 05:10 PM    CO2 24 05/11/2021 05:10 PM    ANION 9.0 05/11/2021 05:10 PM    GLUCOSE 109 (H) 05/11/2021 05:10 PM    BUN 25 (H) 05/11/2021 05:10 PM    CREATININE 0.97 05/11/2021 05:10 PM    CALCIUM 9.7 05/11/2021  05:10 PM    ASTSGOT 32 05/11/2021 05:10 PM    ALTSGPT 38 05/11/2021 05:10 PM    TBILIRUBIN 0.2 05/11/2021 05:10 PM    ALBUMIN 4.2 05/11/2021 05:10 PM    TOTPROTEIN 7.5 05/11/2021 05:10 PM    GLOBULIN 3.3 05/11/2021 05:10 PM    AGRATIO 1.3 05/11/2021 05:10 PM   ]    Lab Results   Component Value Date/Time    PROTHROMBTM 12.8 05/18/2012 08:57 AM    INR 0.95 05/18/2012 08:57 AM        Lab Results   Component Value Date/Time    WBC 8.7 05/11/2021 05:10 PM    RBC 4.60 05/11/2021 05:10 PM    HEMOGLOBIN 14.0 05/11/2021 05:10 PM    HEMATOCRIT 43.6 05/11/2021 05:10 PM    MCV 94.8 05/11/2021 05:10 PM    MCH 30.4 05/11/2021 05:10 PM    MCHC 32.1 (L) 05/11/2021 05:10 PM    MPV 12.3 05/11/2021 05:10 PM    NEUTSPOLYS 53.00 05/11/2021 05:10 PM    LYMPHOCYTES 33.90 05/11/2021 05:10 PM    MONOCYTES 7.90 05/11/2021 05:10 PM    EOSINOPHILS 4.00 05/11/2021 05:10 PM    BASOPHILS 0.70 05/11/2021 05:10 PM        No results found for: HBA1C     Imaging:   I personally reviewed following images, these are my reads    MRI lumbar spine 7/27/2015  No areas of high-grade central stenosis.  Tiny disc protrusions at L4-5 and L5-S1.      IMAGING radiology reads. I reviewed the following radiology reads   MRI left hip 7/27/2015  IMPRESSION:   1.  Gluteal tendinopathy and greater trochanteric bursitis.  2.  Ischia femoral impingement.   3.  3.9 cm left ovarian cyst.                         Results for orders placed in visit on 07/27/15    MR-LUMBAR SPINE-W/O    Impression  1.  L4-5 tiny central disc bulge with underlying annular fissure. No central stenosis or significant foraminal stenosis.  2.  L5-S1 tiny central disc protrusion with underlying annular fissure. No central stenosis or significant foraminal stenosis.         Results for orders placed during the hospital encounter of 10/03/14    DX-ANKLE 3+ VIEWS    Impression  1.  Soft tissue swelling with no acute fracture.  2.  Mild degenerative changes talonavicular joint and possible focal area  of osteonecrosis articular surface talus.        Results for orders placed in visit on 11/08/21    DX-ELBOW-LIMITED 2- LEFT             Results for orders placed during the hospital encounter of 04/07/15    DX-HIP-COMPLETE - UNILATERAL 2+    Impression  Unremarkable examination of the left hip.   Results for orders placed during the hospital encounter of 01/29/09    DX-HIPS-COMPLETE - BILATERAL 3+    Impression  IMPRESSION:    NEGATIVE EXAMINATION OF BOTH HIPS.    SST:dxm      Read By MISHA FIGUEROA MD on Jan 29 2009  4:37PM  : DXM Transcription Date: Jan 30 2009  8:27PM  THIS DOCUMENT HAS BEEN ELECTRONICALLY SIGNED BY: MISHA FIGUEROA MD on  Jan 31 2009  2:34PM        Results for orders placed during the hospital encounter of 01/29/09    DX-LUMBAR SPINE-2 OR 3 VIEWS    Impression  IMPRESSION:    1. MINIMAL ANTERIOR ENDPLATE OSTEOPHYTOSIS AT L1-2 AND L2-3.    2. MINIMAL LEVOCONVEX LUMBAR SCOLIOSIS.    SST:dxm              Results for orders placed during the hospital encounter of 01/29/09    DX-THORACIC SPINE-WITH SWIMMERS VIEW    Impression  IMPRESSION:    1. MINIMAL ANTERIOR ENDPLATE OSTEOPHYTOSIS INFERIORLY.  OTHERWISE  NEGATIVE EXAMINATION OF THE THORACIC SPINE.    SST:dxm       Results for orders placed during the hospital encounter of 04/20/15    DX-WRIST-LIMITED 2-    Impression  No acute osseous abnormality is identified.        Diagnosis   Visit Diagnoses     ICD-10-CM   1. Lumbar radiculopathy  M54.16   2. Impaired mobility and ADLs  Z74.09    Z78.9   3. Impaired physical mobility  Z74.09   4. Chronic hip pain  M25.559    G89.29           ASSESSMENT AND PLAN:  Balbina Brito Prakash 47 y.o. female      Balbina was seen today for new patient.    Diagnoses and all orders for this visit:    Lumbar radiculopathy  -     MR-LUMBAR SPINE-W/O; Future  -     DX-LUMBAR SPINE-2 OR 3 VIEWS; Future  -     gabapentin (NEURONTIN) 300 MG Cap; Take 1-2 Capsules by mouth 3 times a day as needed (pain).  -      acetaminophen (TYLENOL) 500 MG Tab; Take 2 Tablets by mouth 3 times a day as needed (pain.  Do not exceed 3000 mg of total acetaminophen per day).    Impaired mobility and ADLs  -     MR-LUMBAR SPINE-W/O; Future  -     DX-LUMBAR SPINE-2 OR 3 VIEWS; Future  -     gabapentin (NEURONTIN) 300 MG Cap; Take 1-2 Capsules by mouth 3 times a day as needed (pain).  -     acetaminophen (TYLENOL) 500 MG Tab; Take 2 Tablets by mouth 3 times a day as needed (pain.  Do not exceed 3000 mg of total acetaminophen per day).    Impaired physical mobility    Chronic hip pain  -     DX-HIP-BILATERAL-WITH PELVIS-3/4 VIEWS; Future  -     gabapentin (NEURONTIN) 300 MG Cap; Take 1-2 Capsules by mouth 3 times a day as needed (pain).  -     acetaminophen (TYLENOL) 500 MG Tab; Take 2 Tablets by mouth 3 times a day as needed (pain.  Do not exceed 3000 mg of total acetaminophen per day).          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  She is unable to sit or stand.  Most of the exam was difficult to evaluate because of the severity of the patient's pain with all motions.  We discussed emergency precautions.  We discussed signs of cauda equina syndrome.  The patient has none of the signs right now.  I do not believe the patient would tolerate physical therapy or home exercise program at this point.  The patient likely has a disc herniation with lumbar radiculopathy and potential spinal stenosis.  Imaging is necessary at this point.    Diagnostic workup: As above    Medications:   As above.  Continue Zanaflex as needed.  Stop NSAIDs in anticipation of likely epidural steroid injection after the imaging    Interventional program: I would consider the patient for an epidural steroid injection depending on the results of the above     Outside records requested:  The patient signed outside records request form for her outside  records including outside images. This includes the records from [place]      Follow-up: After the above diagnostic studies       Please note that this dictation was created using voice recognition software. I have made every reasonable attempt to correct obvious errors but there may be errors of grammar and content that I may have overlooked prior to finalization of this note.      Davide Bartholomew MD  Physical Medicine and Rehabilitation  Interventional Spine and Sports Physiatry  [facility]           CC Cece Goodwin A.P.R.*   CC Bryson FISCHER M.D.

## 2022-04-18 ENCOUNTER — OFFICE VISIT (OUTPATIENT)
Dept: PHYSICAL MEDICINE AND REHAB | Facility: MEDICAL CENTER | Age: 47
End: 2022-04-18
Payer: COMMERCIAL

## 2022-04-18 VITALS
TEMPERATURE: 97 F | OXYGEN SATURATION: 97 % | WEIGHT: 266.1 LBS | SYSTOLIC BLOOD PRESSURE: 120 MMHG | HEIGHT: 66 IN | BODY MASS INDEX: 42.77 KG/M2 | DIASTOLIC BLOOD PRESSURE: 72 MMHG | HEART RATE: 87 BPM

## 2022-04-18 DIAGNOSIS — Z74.09 IMPAIRED MOBILITY AND ADLS: ICD-10-CM

## 2022-04-18 DIAGNOSIS — M25.559 CHRONIC HIP PAIN, UNSPECIFIED LATERALITY: ICD-10-CM

## 2022-04-18 DIAGNOSIS — M54.16 LUMBAR RADICULOPATHY: ICD-10-CM

## 2022-04-18 DIAGNOSIS — M47.816 LUMBAR SPONDYLOSIS: ICD-10-CM

## 2022-04-18 DIAGNOSIS — G89.29 CHRONIC HIP PAIN, UNSPECIFIED LATERALITY: ICD-10-CM

## 2022-04-18 DIAGNOSIS — Z78.9 IMPAIRED MOBILITY AND ADLS: ICD-10-CM

## 2022-04-18 DIAGNOSIS — Z74.09 IMPAIRED PHYSICAL MOBILITY: ICD-10-CM

## 2022-04-18 PROCEDURE — 99214 OFFICE O/P EST MOD 30 MIN: CPT | Performed by: PHYSICAL MEDICINE & REHABILITATION

## 2022-04-18 ASSESSMENT — FIBROSIS 4 INDEX: FIB4 SCORE: 1.03

## 2022-04-18 ASSESSMENT — PAIN SCALES - GENERAL: PAINLEVEL: 4=SLIGHT-MODERATE PAIN

## 2022-04-18 ASSESSMENT — PATIENT HEALTH QUESTIONNAIRE - PHQ9: CLINICAL INTERPRETATION OF PHQ2 SCORE: 0

## 2022-04-18 NOTE — LETTER
April 18, 2022         Patient: Balbina Randall   YOB: 1975   Date of Visit: 4/18/2022           To Whom it May Concern:    Balbina Randall was seen in my clinic on 4/18/2022. She may return to work on 04/26/2022 and no restrictions.      If you have any questions or concerns, please don't hesitate to call.        Sincerely,           Davide Bartholomew M.D.  Electronically Signed

## 2022-04-18 NOTE — PROGRESS NOTES
Follow up patient note  Interventional spine and Pain  Physiatry (physical medicine and  Rehabilitation)       Chief complaint:   Chief Complaint   Patient presents with   • Follow-Up     Back pain          HISTORY    Please see new patient note by Dr Bartholomew,  for more details.     HPI  Patient identification: Balbina Randall ,  1975,   With Diagnoses of Lumbar radiculopathy, Lumbar spondylosis, Chronic hip pain, Impaired mobility and ADLs, and Impaired physical mobility were pertinent to this visit.     Acute on chronic left-sided low back pain rating down the left leg moderate to severe in intensity constant with difficulty with ADLs.  She denies recent trauma or injuries.  She has tried her home exercise program with no improvement.  She has difficulty with lifting.    Medications tried include toradol, zanaflex, percocet, oral steroids, mobic.          ROS Red Flags :   Fever, Chills, Sweats: Denies  Involuntary Weight Loss: Denies  Bowel/Bladder Incontinence: Denies  Saddle Anesthesia: Denies        PMHx:   Past Medical History:   Diagnosis Date   • Airway problem 1/19/15    abnormal airway assessment as noted on procedure orders per MD (Dr. Serrato)   • Heart burn    • Indigestion    • Other specified symptom associated with female genital organs     ovarian cyst, adhesions   • Harleen-Parkinson-White syndrome    • WPW (Harleen-Parkinson-White syndrome) since birth    cardiologist, Dr. So       PSHx:   Past Surgical History:   Procedure Laterality Date   • AL SHLDR ARTHROSCOP,PART ACROMIOPLAS Right 2020    Procedure: DECOMPRESSION, SHOULDER, ARTHROSCOPIC - SUBACROMIAL, EXTENSIVE DEBRIDEMENT;  Surgeon: Robert Park M.D.;  Location: SURGERY [facility];  Service: Orthopedics   • CLAVICLE DISTAL EXCISION Right 2020    Procedure: EXCISION, CLAVICLE, DISTAL;  Surgeon: Robert Park M.D.;  Location: SURGERY [facility];  Service: Orthopedics   • ROTATOR CUFF REPAIR Right  "1/29/2020    Procedure: REPAIR, ROTATOR CUFF;  Surgeon: Robert Park M.D.;  Location: SURGERY [facility];  Service: Orthopedics   • HYSTEROSCOPY NOVASURE-2  4/27/2017    Procedure: HYSTEROSCOPY NOVASURE;  Surgeon: Daxa Webb M.D.;  Location: SURGERY SAME DAY [facility];  Service:    • WRIST ARTHROSCOPY Right 1/13/2016    Procedure: WRIST ARTHROSCOPY with debridment  ;  Surgeon: Jose Daniel Redmond M.D.;  Location: SURGERY [facility];  Service:    • LIGAMENT REPAIR  1/13/2016    Procedure: LIGAMENT REPAIR;  Surgeon: Jose Daniel Redmond M.D.;  Location: SURGERY [facility];  Service:    • COLONOSCOPY WITH BIOPSY  1/26/2015    Performed by Mauricio Serrato M.D. at [facility]   • GASTROSCOPY WITH BIOPSY  1/26/2015    Performed by Mauricio Serarto M.D. at [facility]   • HYSTEROSCOPY WITH VIDEO DIAGNOSTIC  5/24/2013    Performed by Robert Rosario M.D. at [facility]   • KIRT BY LAPAROSCOPY  9/2/2010    Performed by KIM ZHAO at [facility]   • PRIMARY C SECTION  4/30/2010    Performed by DAXA WEBB at [facility]   • BOWEL RESECTION  1992    \"adhesions\"   • APPENDECTOMY  1992   • LAPAROSCOPY  1992, 2002   • OTHER      kenalog inj left foot       Family history   Family History   Problem Relation Age of Onset   • Hypertension Other    • Cancer Other          Medications:   Outpatient Medications Marked as Taking for the 4/18/22 encounter (Office Visit) with Davide Bartholomew M.D.   Medication Sig Dispense Refill   • estradiol (VAGIFEM) 10 MCG Tab insert one tablet vaginally two times a week     • gabapentin (NEURONTIN) 300 MG Cap Take 1-2 Capsules by mouth 3 times a day as needed (pain). 180 Capsule 3   • acetaminophen (TYLENOL) 500 MG Tab Take 2 Tablets by mouth 3 times a day as needed (pain.  Do not exceed 3000 mg of total acetaminophen per day). 180 Tablet 6   • tizanidine (ZANAFLEX) 4 MG Tab Take 1 Tablet by mouth " every 6 hours as needed. 30 Tablet 3   • pantoprazole (PROTONIX) 40 MG Tablet Delayed Response           Current Outpatient Medications on File Prior to Visit   Medication Sig Dispense Refill   • estradiol (VAGIFEM) 10 MCG Tab insert one tablet vaginally two times a week     • gabapentin (NEURONTIN) 300 MG Cap Take 1-2 Capsules by mouth 3 times a day as needed (pain). 180 Capsule 3   • acetaminophen (TYLENOL) 500 MG Tab Take 2 Tablets by mouth 3 times a day as needed (pain.  Do not exceed 3000 mg of total acetaminophen per day). 180 Tablet 6   • tizanidine (ZANAFLEX) 4 MG Tab Take 1 Tablet by mouth every 6 hours as needed. 30 Tablet 3   • pantoprazole (PROTONIX) 40 MG Tablet Delayed Response      • ketorolac (TORADOL) 10 MG Tab  (Patient not taking: Reported on 4/14/2022)     • meloxicam (MOBIC) 15 MG tablet  (Patient not taking: Reported on 4/18/2022)     • sucralfate (CARAFATE) 1 GM/10ML Suspension        No current facility-administered medications on file prior to visit.         Allergies:   Allergies   Allergen Reactions   • Latex Rash     [id number]   • Other Drug Rash     Allergic to Estrogen IM - preservative within medication  [id number]   • Sulfa Drugs Rash and Itching     [id number]   • Sulfadiazine      Reaction: ITCHING Create Date: [id number] Create User Name: Lindsay Alvarez Update Date: [id number] Update User Name: Lindsay Alvarez       Social Hx:   Social History     Socioeconomic History   • Marital status:      Spouse name: Not on file   • Number of children: Not on file   • Years of education: Not on file   • Highest education level: Not on file   Occupational History   • Not on file   Tobacco Use   • Smoking status: Never Smoker   • Smokeless tobacco: Never Used   Vaping Use   • Vaping Use: Never used   Substance and Sexual Activity   • Alcohol use: Yes     Alcohol/week: 0.0 oz     Comment: 3-4 month   • Drug use: No   • Sexual activity: Not on file   Other Topics Concern   • Not on file  "  Social History Narrative   • Not on file     Social Determinants of Health     Financial Resource Strain: Not on file   Food Insecurity: Not on file   Transportation Needs: Not on file   Physical Activity: Not on file   Stress: Not on file   Social Connections: Not on file   Intimate Partner Violence: Not on file   Housing Stability: Not on file         EXAMINATION     Physical Exam:   Vitals: /72 (BP Location: Right arm, Patient Position: Sitting, BP Cuff Size: Adult)   Pulse 87   Temp 36.1 °C (97 °F) (Temporal)   Ht 1.676 m (5' 6\")   Wt 121 kg (266 lb 1.5 oz)   SpO2 97%     Constitutional:   Body Habitus: Body mass index is 42.95 kg/m².  Cooperation: Fully cooperates with exam  Appearance: Well-groomed no disheveled    Respiratory-  breathing comfortable on room air, no audible wheezing  Cardiovascular- capillary refills less than 2 seconds. No lower extremity edema is noted.   Psychiatric- alert and oriented ×3. Normal affect.    MSK and Neuro: -      Thoracic/Lumbar Spine/Sacral Spine/Hips   decreased active range of motion with flexion, lateral flexion, and rotation bilaterally.   There is decreased active range of motion with lumbar extension.    There is  pain with lumbar extension.   There is pain with facet loading maneuver (extension rotation) with axial low back pain on the LEFT side(s)    Palpation:   No tenderness to palpation in midline at T1-T12 levels. No tenderness to palpation in the left and right of the midline T1-L5, NEGATIVE for tenderness to palpation to the para-midline region in the lower lumbar levels.  palpation over SI joint: negative bilaterally    palpation in hip or over the gluteus medius tendon insertion: negative bilaterally      Lumbar spine Special tests  Neuro tension    Slump test negative right, positive left      Key points for the international standards for neurological classification of spinal cord injury (ISNCSCI) to light touch.     Dermatome R L              "                         L2 2 2   L3 2 2   L4 2 2   L5 2 2   S1 2 2   S2 2 2         Motor Exam Lower Extremities    ? Myotome R L   Hip flexion L2 5 4   Knee extension L3 5 4   Ankle dorsiflexion L4 5 5-   Toe extension L5 5 5   Ankle plantarflexion S1 5 5                 MEDICAL DECISION MAKING    DATA    Labs:   Lab Results   Component Value Date/Time    SODIUM 142 05/11/2021 05:10 PM    POTASSIUM 4.1 05/11/2021 05:10 PM    CHLORIDE 109 05/11/2021 05:10 PM    CO2 24 05/11/2021 05:10 PM    GLUCOSE 109 (H) 05/11/2021 05:10 PM    BUN 25 (H) 05/11/2021 05:10 PM    CREATININE 0.97 05/11/2021 05:10 PM        Lab Results   Component Value Date/Time    PROTHROMBTM 12.8 05/18/2012 08:57 AM    INR 0.95 05/18/2012 08:57 AM        Lab Results   Component Value Date/Time    WBC 8.7 05/11/2021 05:10 PM    RBC 4.60 05/11/2021 05:10 PM    HEMOGLOBIN 14.0 05/11/2021 05:10 PM    HEMATOCRIT 43.6 05/11/2021 05:10 PM    MCV 94.8 05/11/2021 05:10 PM    MCH 30.4 05/11/2021 05:10 PM    MCHC 32.1 (L) 05/11/2021 05:10 PM    MPV 12.3 05/11/2021 05:10 PM    NEUTSPOLYS 53.00 05/11/2021 05:10 PM    LYMPHOCYTES 33.90 05/11/2021 05:10 PM    MONOCYTES 7.90 05/11/2021 05:10 PM    EOSINOPHILS 4.00 05/11/2021 05:10 PM    BASOPHILS 0.70 05/11/2021 05:10 PM        No results found for: HBA1C       Imaging:   I personally reviewed following images    MRI lumbar spine 4/14/2022  There are no areas of high-grade neuroforaminal or central canal stenosis.  There is facet arthropathy worst on the left at L3-4, L4-5, L5-S1.  Degenerative disc disease at L4-5 and L5-S1.  High intensity zone's at the left paracentric region at L4-5 and L5-S1 which can be consistent with an annular tear.      MRI lumbar spine 7/27/2015  No areas of high-grade central stenosis.  Tiny disc protrusions at L4-5 and L5-S1.    I reviewed the following radiology reports    MRI left hip 7/27/2015  IMPRESSION:   1.  Gluteal tendinopathy and greater trochanteric bursitis.  2.  Ischia  femoral impingement.   3.  3.9 cm left ovarian cyst.           Results for orders placed during the hospital encounter of 04/14/22    MR-LUMBAR SPINE-W/O    Impression  1.  Mild degenerative disc disease at L4-5 and L5-S1 as described. Central annular fissure posterior disc is unchanged at both levels.    2.  Facet arthropathy at L3-L4, left greater than right.                                                        Results for orders placed during the hospital encounter of 04/23/21    CT-CHEST (THORAX) WITH    Impression  1.  No evidence of focal consolidation or pleural effusion.    2.  No evidence of mediastinal or hilar adenopathy.    3.  Fatty liver.    4.  Prior cholecystectomy.            Results for orders placed during the hospital encounter of 05/02/18    CT-ABDOMEN WITH & W/O    Impression  Unremarkable CT scan of the abdomen with attention to the pancreas.    Status post cholecystectomy.       Results for orders placed during the hospital encounter of 07/01/16    CT-ABDOMEN-PELVIS WITH    Impression  Unremarkable contrast-enhanced CT of the abdomen and pelvis though appendix not visualized.                   Results for orders placed during the hospital encounter of 10/03/14    DX-ANKLE 3+ VIEWS    Impression  1.  Soft tissue swelling with no acute fracture.  2.  Mild degenerative changes talonavicular joint and possible focal area of osteonecrosis articular surface talus.        Results for orders placed in visit on 11/08/21    DX-ELBOW-LIMITED 2- LEFT             Results for orders placed during the hospital encounter of 04/07/15    DX-HIP-COMPLETE - UNILATERAL 2+    Impression  Unremarkable examination of the left hip.   Results for orders placed during the hospital encounter of 01/29/09    DX-HIPS-COMPLETE - BILATERAL 3+    Impression  IMPRESSION:    NEGATIVE EXAMINATION OF BOTH HIPS.    SST:angel      Read By MISHA FIGUEROA MD on Jan 29 2009  4:37PM  : ANGEL Transcription Date: Jan 30    8:27PM  THIS DOCUMENT HAS BEEN ELECTRONICALLY SIGNED BY: MISHA FIGUEROA MD on  2009  2:34PM        Results for orders placed during the hospital encounter of 22    DX-LUMBAR SPINE-2 OR 3 VIEWS    Impression  1.  Minimal retrolisthesis of L1 on L2.  2.  Mild degenerative changes at L5/S1.  3.  Mild facet arthropathy in the lower lumbar spine.              Results for orders placed during the hospital encounter of 09    DX-THORACIC SPINE-WITH SWIMMERS VIEW    Impression  IMPRESSION:    1. MINIMAL ANTERIOR ENDPLATE OSTEOPHYTOSIS INFERIORLY.  OTHERWISE  NEGATIVE EXAMINATION OF THE THORACIC SPINE.    SST:dxm       Results for orders placed during the hospital encounter of 04/20/15    DX-WRIST-LIMITED 2-    Impression  No acute osseous abnormality is identified.        DIAGNOSIS   Visit Diagnoses     ICD-10-CM   1. Lumbar radiculopathy  M54.16   2. Lumbar spondylosis  M47.816   3. Chronic hip pain  M25.559    G89.29   4. Impaired mobility and ADLs  Z74.09    Z78.9   5. Impaired physical mobility  Z74.09         ASSESSMENT and PLAN:     Balbina Randall  1975 female      Balbina was seen today for follow-up.    Diagnoses and all orders for this visit:    Lumbar radiculopathy  -     Referral to Physical Medicine Rehab  -     Referral to Physical Therapy    Lumbar spondylosis  -     Referral to Physical Therapy    Chronic hip pain  -     Referral to Physical Therapy    Impaired mobility and ADLs  -     Referral to Physical Therapy    Impaired physical mobility  -     Referral to Physical Therapy        The patient continues to have significant left low back pain rating down the left leg likely secondary to her lumbar radiculopathy.  Overall there were no signs of large disc herniations or severe stenosis on imaging.  However they were high intensity zones which could be consistent with the annular tear and consistent with the patient's symptoms.  She also likely has pain from lumbar  spondylosis and facet arthropathy on the left side worse than the right.    I have ordered a left L3-4 and L4-5 transforaminal epidural steroid injection    The risks benefits and alternatives to this procedure were discussed and the patient wishes to proceed with the procedure. Risks include but are not limited to damage to surrounding structures, infection, bleeding, worsening of pain which can be permanent, weakness which can be permanent. Benefits include pain relief, improved function. Alternatives includes not doing the procedure.      Medications: The patient can continue gabapentin and Zanaflex.  Stop NSAIDs including Toradol and meloxicam for 5 days prior to the procedure above.        Follow up: After the above procedure    Thank you for allowing me to participate in the care of this patient. If you have any questions please not hesitate to contact me.             Please note that this dictation was created using voice recognition software. I have made every reasonable attempt to correct obvious errors but there may be errors of grammar and content that I may have overlooked prior to finalization of this note.      Davide Bartholomew MD  Interventional Spine and Sports Physiatry  Physical Medicine and Rehabilitation  [facility]

## 2022-04-18 NOTE — PATIENT INSTRUCTIONS
Your procedure will be at the [facility] special procedure suite.    [facility] [place] [address]       PRE-PROCEDURE INSTRUCTIONS  You may take your regular medications except:   · No Anti-inflammatories 5 days prior to your procedure. Anti-inflammatories include medicines such as  ibuprofen (Motrin, Advil), Excedrin, Naproxen (Aleve, Anaprox, Naprelan, Naprosyn), Celecoxib (Celebrex), Diclofenac (Voltaren-XR tab), and Meloxicam (Mobic).   · You can take the remainder of your pain medications as prescribed.   · If you are having a diagnostic procedure such as a medial branch block, do not use her pain meds on the day of the procedure  · No Glucophage or Metformin 24 hours before your procedure. You may resume next day after your procedure.  · Call the physiatry office if you are taking or prescribed anti-biotics within five days of procedure.  · Please ask provider if you are taking any new diabetes medication.  · CONTINUE TAKING BLOOD PRESSURE MEDICATIONS AS PRESCRIBED.  · Pain medications will not be prescribed on the procedure day. Procedural pain medication may be used by your provider   · Call your doctor's office performing the procedure if you have a fever, chills, rash or new illness prior to your procedure    Anticoagulation/antiplatelet medications  No Blood thinning medications such as Coumadin, Xarelto, aspirin or Plavix 5 days prior to procedure unless your doctor said to continue these medications. Call your doctor if a new medication is prescribed in this class.     Restrictions for eating before procedure:   · If you are getting procedural sedation, then do not eat to for 8 hours prior to procedure appointment time. Do not drink fluids for four hours prior to your procedure time.   · If you are not having procedural sedation, then Skip the meal prior to your procedure. If you have a morning procedure then skip breakfast. If you have an afternoon procedure then skip lunch.   · You  may drink clear liquids up to 2 hours prior to your procedure  · You must have a  the day of procedure to accompany you home.      POST PROCEDURE INSTRUCTIONS   · No heavy lifting, strenuous bending or strenuous exercise for 3 days after your procedure.  · No hot tubs, baths, swimming for 3 days after your procedure  · You can remove the bandage the day after the procedure.  · IF YOU RECEIVED A STEROID INJECTION. PLEASE NOTE THAT THERE MAY BE A DELAY FOR THE INJECTION TO START WORKING, THE DELAY MAY BE UP TO TWO WEEKS. IF YOU HAVE DIABETES, PLEASE NOTE THAT YOUR SUGAR LEVELS MAY BE ELEVATED FOR 1-2 DAYS AFTER A STEROID INJECTION.  THE STEROID MAY CAUSE TEMPORARY SYMPTOMS WHICH USUALLY RESOLVE ON THEIR OWN WITHIN 1 TO 2 DAYS INCLUDING FACIAL FLUSHING OR A FEELING OF WARMTH ON THE FACE, TEMPORARY INCREASES IN BLOOD SUGAR, INSOMNIA, INCREASED HUNGER  · IF YOU EXPERIENCE PROLONGED WEAKNESS LONGER THAN ONE DAY, BOWEL OR BLADDER INCONTINENCE THEN PLEASE CALL THE PHYSIATRY OFFICE.  · Your leg may feel heavy, weak and numb for up to 1-2 days. Be very careful walking.   ·  You may resume normal activities 3 days after procedure.

## 2022-04-18 NOTE — H&P (VIEW-ONLY)
Follow up patient note  Interventional spine and Pain  Physiatry (physical medicine and  Rehabilitation)       Chief complaint:   Chief Complaint   Patient presents with   • Follow-Up     Back pain          HISTORY    Please see new patient note by Dr Bartholomew,  for more details.     HPI  Patient identification: Balbina Randall ,  1975,   With Diagnoses of Lumbar radiculopathy, Lumbar spondylosis, Chronic hip pain, Impaired mobility and ADLs, and Impaired physical mobility were pertinent to this visit.     Acute on chronic left-sided low back pain rating down the left leg moderate to severe in intensity constant with difficulty with ADLs.  She denies recent trauma or injuries.  She has tried her home exercise program with no improvement.  She has difficulty with lifting.    Medications tried include toradol, zanaflex, percocet, oral steroids, mobic.          ROS Red Flags :   Fever, Chills, Sweats: Denies  Involuntary Weight Loss: Denies  Bowel/Bladder Incontinence: Denies  Saddle Anesthesia: Denies        PMHx:   Past Medical History:   Diagnosis Date   • Airway problem 1/19/15    abnormal airway assessment as noted on procedure orders per MD (Dr. Serrato)   • Heart burn    • Indigestion    • Other specified symptom associated with female genital organs     ovarian cyst, adhesions   • Harleen-Parkinson-White syndrome    • WPW (Harleen-Parkinson-White syndrome) since birth    cardiologist, Dr. So       PSHx:   Past Surgical History:   Procedure Laterality Date   • KS SHLDR ARTHROSCOP,PART ACROMIOPLAS Right 2020    Procedure: DECOMPRESSION, SHOULDER, ARTHROSCOPIC - SUBACROMIAL, EXTENSIVE DEBRIDEMENT;  Surgeon: Robert Park M.D.;  Location: SURGERY [facility];  Service: Orthopedics   • CLAVICLE DISTAL EXCISION Right 2020    Procedure: EXCISION, CLAVICLE, DISTAL;  Surgeon: Robert Park M.D.;  Location: SURGERY [facility];  Service: Orthopedics   • ROTATOR CUFF REPAIR Right  "1/29/2020    Procedure: REPAIR, ROTATOR CUFF;  Surgeon: Robert Park M.D.;  Location: SURGERY [facility];  Service: Orthopedics   • HYSTEROSCOPY NOVASURE-2  4/27/2017    Procedure: HYSTEROSCOPY NOVASURE;  Surgeon: Daxa Webb M.D.;  Location: SURGERY SAME DAY [facility];  Service:    • WRIST ARTHROSCOPY Right 1/13/2016    Procedure: WRIST ARTHROSCOPY with debridment  ;  Surgeon: Jose Danile Redmond M.D.;  Location: SURGERY [facility];  Service:    • LIGAMENT REPAIR  1/13/2016    Procedure: LIGAMENT REPAIR;  Surgeon: Jose Daniel Redmond M.D.;  Location: SURGERY [facility];  Service:    • COLONOSCOPY WITH BIOPSY  1/26/2015    Performed by Mauricio Serrato M.D. at [facility]   • GASTROSCOPY WITH BIOPSY  1/26/2015    Performed by Mauricio Serrato M.D. at [facility]   • HYSTEROSCOPY WITH VIDEO DIAGNOSTIC  5/24/2013    Performed by Robert Rosario M.D. at [facility]   • KIRT BY LAPAROSCOPY  9/2/2010    Performed by KIM ZHAO at [facility]   • PRIMARY C SECTION  4/30/2010    Performed by DAXA WEBB at [facility]   • BOWEL RESECTION  1992    \"adhesions\"   • APPENDECTOMY  1992   • LAPAROSCOPY  1992, 2002   • OTHER      kenalog inj left foot       Family history   Family History   Problem Relation Age of Onset   • Hypertension Other    • Cancer Other          Medications:   Outpatient Medications Marked as Taking for the 4/18/22 encounter (Office Visit) with Davide Bartholomew M.D.   Medication Sig Dispense Refill   • estradiol (VAGIFEM) 10 MCG Tab insert one tablet vaginally two times a week     • gabapentin (NEURONTIN) 300 MG Cap Take 1-2 Capsules by mouth 3 times a day as needed (pain). 180 Capsule 3   • acetaminophen (TYLENOL) 500 MG Tab Take 2 Tablets by mouth 3 times a day as needed (pain.  Do not exceed 3000 mg of total acetaminophen per day). 180 Tablet 6   • tizanidine (ZANAFLEX) 4 MG Tab Take 1 Tablet by mouth " every 6 hours as needed. 30 Tablet 3   • pantoprazole (PROTONIX) 40 MG Tablet Delayed Response           Current Outpatient Medications on File Prior to Visit   Medication Sig Dispense Refill   • estradiol (VAGIFEM) 10 MCG Tab insert one tablet vaginally two times a week     • gabapentin (NEURONTIN) 300 MG Cap Take 1-2 Capsules by mouth 3 times a day as needed (pain). 180 Capsule 3   • acetaminophen (TYLENOL) 500 MG Tab Take 2 Tablets by mouth 3 times a day as needed (pain.  Do not exceed 3000 mg of total acetaminophen per day). 180 Tablet 6   • tizanidine (ZANAFLEX) 4 MG Tab Take 1 Tablet by mouth every 6 hours as needed. 30 Tablet 3   • pantoprazole (PROTONIX) 40 MG Tablet Delayed Response      • ketorolac (TORADOL) 10 MG Tab  (Patient not taking: Reported on 4/14/2022)     • meloxicam (MOBIC) 15 MG tablet  (Patient not taking: Reported on 4/18/2022)     • sucralfate (CARAFATE) 1 GM/10ML Suspension        No current facility-administered medications on file prior to visit.         Allergies:   Allergies   Allergen Reactions   • Latex Rash     [id number]   • Other Drug Rash     Allergic to Estrogen IM - preservative within medication  [id number]   • Sulfa Drugs Rash and Itching     [id number]   • Sulfadiazine      Reaction: ITCHING Create Date: [id number] Create User Name: Lindsay Alvarez Update Date: [id number] Update User Name: Lindsay Alvarez       Social Hx:   Social History     Socioeconomic History   • Marital status:      Spouse name: Not on file   • Number of children: Not on file   • Years of education: Not on file   • Highest education level: Not on file   Occupational History   • Not on file   Tobacco Use   • Smoking status: Never Smoker   • Smokeless tobacco: Never Used   Vaping Use   • Vaping Use: Never used   Substance and Sexual Activity   • Alcohol use: Yes     Alcohol/week: 0.0 oz     Comment: 3-4 month   • Drug use: No   • Sexual activity: Not on file   Other Topics Concern   • Not on file  "  Social History Narrative   • Not on file     Social Determinants of Health     Financial Resource Strain: Not on file   Food Insecurity: Not on file   Transportation Needs: Not on file   Physical Activity: Not on file   Stress: Not on file   Social Connections: Not on file   Intimate Partner Violence: Not on file   Housing Stability: Not on file         EXAMINATION     Physical Exam:   Vitals: /72 (BP Location: Right arm, Patient Position: Sitting, BP Cuff Size: Adult)   Pulse 87   Temp 36.1 °C (97 °F) (Temporal)   Ht 1.676 m (5' 6\")   Wt 121 kg (266 lb 1.5 oz)   SpO2 97%     Constitutional:   Body Habitus: Body mass index is 42.95 kg/m².  Cooperation: Fully cooperates with exam  Appearance: Well-groomed no disheveled    Respiratory-  breathing comfortable on room air, no audible wheezing  Cardiovascular- capillary refills less than 2 seconds. No lower extremity edema is noted.   Psychiatric- alert and oriented ×3. Normal affect.    MSK and Neuro: -      Thoracic/Lumbar Spine/Sacral Spine/Hips   decreased active range of motion with flexion, lateral flexion, and rotation bilaterally.   There is decreased active range of motion with lumbar extension.    There is  pain with lumbar extension.   There is pain with facet loading maneuver (extension rotation) with axial low back pain on the LEFT side(s)    Palpation:   No tenderness to palpation in midline at T1-T12 levels. No tenderness to palpation in the left and right of the midline T1-L5, NEGATIVE for tenderness to palpation to the para-midline region in the lower lumbar levels.  palpation over SI joint: negative bilaterally    palpation in hip or over the gluteus medius tendon insertion: negative bilaterally      Lumbar spine Special tests  Neuro tension    Slump test negative right, positive left      Key points for the international standards for neurological classification of spinal cord injury (ISNCSCI) to light touch.     Dermatome R L              "                         L2 2 2   L3 2 2   L4 2 2   L5 2 2   S1 2 2   S2 2 2         Motor Exam Lower Extremities    ? Myotome R L   Hip flexion L2 5 4   Knee extension L3 5 4   Ankle dorsiflexion L4 5 5-   Toe extension L5 5 5   Ankle plantarflexion S1 5 5                 MEDICAL DECISION MAKING    DATA    Labs:   Lab Results   Component Value Date/Time    SODIUM 142 05/11/2021 05:10 PM    POTASSIUM 4.1 05/11/2021 05:10 PM    CHLORIDE 109 05/11/2021 05:10 PM    CO2 24 05/11/2021 05:10 PM    GLUCOSE 109 (H) 05/11/2021 05:10 PM    BUN 25 (H) 05/11/2021 05:10 PM    CREATININE 0.97 05/11/2021 05:10 PM        Lab Results   Component Value Date/Time    PROTHROMBTM 12.8 05/18/2012 08:57 AM    INR 0.95 05/18/2012 08:57 AM        Lab Results   Component Value Date/Time    WBC 8.7 05/11/2021 05:10 PM    RBC 4.60 05/11/2021 05:10 PM    HEMOGLOBIN 14.0 05/11/2021 05:10 PM    HEMATOCRIT 43.6 05/11/2021 05:10 PM    MCV 94.8 05/11/2021 05:10 PM    MCH 30.4 05/11/2021 05:10 PM    MCHC 32.1 (L) 05/11/2021 05:10 PM    MPV 12.3 05/11/2021 05:10 PM    NEUTSPOLYS 53.00 05/11/2021 05:10 PM    LYMPHOCYTES 33.90 05/11/2021 05:10 PM    MONOCYTES 7.90 05/11/2021 05:10 PM    EOSINOPHILS 4.00 05/11/2021 05:10 PM    BASOPHILS 0.70 05/11/2021 05:10 PM        No results found for: HBA1C       Imaging:   I personally reviewed following images    MRI lumbar spine 4/14/2022  There are no areas of high-grade neuroforaminal or central canal stenosis.  There is facet arthropathy worst on the left at L3-4, L4-5, L5-S1.  Degenerative disc disease at L4-5 and L5-S1.  High intensity zone's at the left paracentric region at L4-5 and L5-S1 which can be consistent with an annular tear.      MRI lumbar spine 7/27/2015  No areas of high-grade central stenosis.  Tiny disc protrusions at L4-5 and L5-S1.    I reviewed the following radiology reports    MRI left hip 7/27/2015  IMPRESSION:   1.  Gluteal tendinopathy and greater trochanteric bursitis.  2.  Ischia  femoral impingement.   3.  3.9 cm left ovarian cyst.           Results for orders placed during the hospital encounter of 04/14/22    MR-LUMBAR SPINE-W/O    Impression  1.  Mild degenerative disc disease at L4-5 and L5-S1 as described. Central annular fissure posterior disc is unchanged at both levels.    2.  Facet arthropathy at L3-L4, left greater than right.                                                        Results for orders placed during the hospital encounter of 04/23/21    CT-CHEST (THORAX) WITH    Impression  1.  No evidence of focal consolidation or pleural effusion.    2.  No evidence of mediastinal or hilar adenopathy.    3.  Fatty liver.    4.  Prior cholecystectomy.            Results for orders placed during the hospital encounter of 05/02/18    CT-ABDOMEN WITH & W/O    Impression  Unremarkable CT scan of the abdomen with attention to the pancreas.    Status post cholecystectomy.       Results for orders placed during the hospital encounter of 07/01/16    CT-ABDOMEN-PELVIS WITH    Impression  Unremarkable contrast-enhanced CT of the abdomen and pelvis though appendix not visualized.                   Results for orders placed during the hospital encounter of 10/03/14    DX-ANKLE 3+ VIEWS    Impression  1.  Soft tissue swelling with no acute fracture.  2.  Mild degenerative changes talonavicular joint and possible focal area of osteonecrosis articular surface talus.        Results for orders placed in visit on 11/08/21    DX-ELBOW-LIMITED 2- LEFT             Results for orders placed during the hospital encounter of 04/07/15    DX-HIP-COMPLETE - UNILATERAL 2+    Impression  Unremarkable examination of the left hip.   Results for orders placed during the hospital encounter of 01/29/09    DX-HIPS-COMPLETE - BILATERAL 3+    Impression  IMPRESSION:    NEGATIVE EXAMINATION OF BOTH HIPS.    SST:angel      Read By MISHA FIGUEROA MD on Jan 29 2009  4:37PM  : ANGEL Transcription Date: Jan 30    8:27PM  THIS DOCUMENT HAS BEEN ELECTRONICALLY SIGNED BY: MISHA FIGUEROA MD on  2009  2:34PM        Results for orders placed during the hospital encounter of 22    DX-LUMBAR SPINE-2 OR 3 VIEWS    Impression  1.  Minimal retrolisthesis of L1 on L2.  2.  Mild degenerative changes at L5/S1.  3.  Mild facet arthropathy in the lower lumbar spine.              Results for orders placed during the hospital encounter of 09    DX-THORACIC SPINE-WITH SWIMMERS VIEW    Impression  IMPRESSION:    1. MINIMAL ANTERIOR ENDPLATE OSTEOPHYTOSIS INFERIORLY.  OTHERWISE  NEGATIVE EXAMINATION OF THE THORACIC SPINE.    SST:dxm       Results for orders placed during the hospital encounter of 04/20/15    DX-WRIST-LIMITED 2-    Impression  No acute osseous abnormality is identified.        DIAGNOSIS   Visit Diagnoses     ICD-10-CM   1. Lumbar radiculopathy  M54.16   2. Lumbar spondylosis  M47.816   3. Chronic hip pain  M25.559    G89.29   4. Impaired mobility and ADLs  Z74.09    Z78.9   5. Impaired physical mobility  Z74.09         ASSESSMENT and PLAN:     Balbina Ranadll  1975 female      Balbina was seen today for follow-up.    Diagnoses and all orders for this visit:    Lumbar radiculopathy  -     Referral to Physical Medicine Rehab  -     Referral to Physical Therapy    Lumbar spondylosis  -     Referral to Physical Therapy    Chronic hip pain  -     Referral to Physical Therapy    Impaired mobility and ADLs  -     Referral to Physical Therapy    Impaired physical mobility  -     Referral to Physical Therapy        The patient continues to have significant left low back pain rating down the left leg likely secondary to her lumbar radiculopathy.  Overall there were no signs of large disc herniations or severe stenosis on imaging.  However they were high intensity zones which could be consistent with the annular tear and consistent with the patient's symptoms.  She also likely has pain from lumbar  spondylosis and facet arthropathy on the left side worse than the right.    I have ordered a left L3-4 and L4-5 transforaminal epidural steroid injection    The risks benefits and alternatives to this procedure were discussed and the patient wishes to proceed with the procedure. Risks include but are not limited to damage to surrounding structures, infection, bleeding, worsening of pain which can be permanent, weakness which can be permanent. Benefits include pain relief, improved function. Alternatives includes not doing the procedure.      Medications: The patient can continue gabapentin and Zanaflex.  Stop NSAIDs including Toradol and meloxicam for 5 days prior to the procedure above.        Follow up: After the above procedure    Thank you for allowing me to participate in the care of this patient. If you have any questions please not hesitate to contact me.             Please note that this dictation was created using voice recognition software. I have made every reasonable attempt to correct obvious errors but there may be errors of grammar and content that I may have overlooked prior to finalization of this note.      Davide Bartholomew MD  Interventional Spine and Sports Physiatry  Physical Medicine and Rehabilitation  [facility]

## 2022-04-19 ENCOUNTER — APPOINTMENT (OUTPATIENT)
Dept: RADIOLOGY | Facility: REHABILITATION | Age: 47
End: 2022-04-19
Attending: PHYSICAL MEDICINE & REHABILITATION
Payer: COMMERCIAL

## 2022-04-19 ENCOUNTER — HOSPITAL ENCOUNTER (OUTPATIENT)
Facility: REHABILITATION | Age: 47
End: 2022-04-19
Attending: PHYSICAL MEDICINE & REHABILITATION | Admitting: PHYSICAL MEDICINE & REHABILITATION
Payer: COMMERCIAL

## 2022-04-19 VITALS
WEIGHT: 266.98 LBS | DIASTOLIC BLOOD PRESSURE: 95 MMHG | OXYGEN SATURATION: 95 % | RESPIRATION RATE: 16 BRPM | SYSTOLIC BLOOD PRESSURE: 131 MMHG | HEART RATE: 70 BPM | BODY MASS INDEX: 42.91 KG/M2 | TEMPERATURE: 97 F | HEIGHT: 66 IN

## 2022-04-19 PROCEDURE — 700117 HCHG RX CONTRAST REV CODE 255

## 2022-04-19 PROCEDURE — 64483 NJX AA&/STRD TFRM EPI L/S 1: CPT

## 2022-04-19 PROCEDURE — 700111 HCHG RX REV CODE 636 W/ 250 OVERRIDE (IP)

## 2022-04-19 PROCEDURE — 64484 NJX AA&/STRD TFRM EPI L/S EA: CPT

## 2022-04-19 RX ORDER — DEXAMETHASONE SODIUM PHOSPHATE 10 MG/ML
INJECTION, SOLUTION INTRAMUSCULAR; INTRAVENOUS
Status: COMPLETED
Start: 2022-04-19 | End: 2022-04-19

## 2022-04-19 RX ORDER — LIDOCAINE HYDROCHLORIDE 10 MG/ML
INJECTION, SOLUTION EPIDURAL; INFILTRATION; INTRACAUDAL; PERINEURAL
Status: COMPLETED
Start: 2022-04-19 | End: 2022-04-19

## 2022-04-19 RX ADMIN — LIDOCAINE HYDROCHLORIDE 10 ML: 10 INJECTION, SOLUTION EPIDURAL; INFILTRATION; INTRACAUDAL; PERINEURAL at 08:58

## 2022-04-19 RX ADMIN — DEXAMETHASONE SODIUM PHOSPHATE 10 MG: 10 INJECTION, SOLUTION INTRAMUSCULAR; INTRAVENOUS at 08:58

## 2022-04-19 RX ADMIN — IOHEXOL 5 ML: 240 INJECTION, SOLUTION INTRATHECAL; INTRAVASCULAR; INTRAVENOUS; ORAL at 08:57

## 2022-04-19 ASSESSMENT — PAIN DESCRIPTION - PAIN TYPE
TYPE: CHRONIC PAIN

## 2022-04-19 ASSESSMENT — FIBROSIS 4 INDEX: FIB4 SCORE: 1.03

## 2022-04-19 NOTE — INTERVAL H&P NOTE
H&P reviewed. The patient was examined and there are no changes to the H&P     Lungs clear to auscultation bilaterally.  No abdominal tenderness.  Heart regular rate and rhythm.  Vitals reviewed.    Proceed with the originally scheduled procedure.  The risks, benefits and alternatives were discussed.  The patient understands these.    Pre-Op Diagnosis Codes:     * Lumbar radiculopathy [M54.16]  Procedure(s):  LEFT L3-4 and L4-5 transforaminal epidural steroid injection with fluoroscopic guidance  .    Davide Bartholomew MD  Physical Medicine and Rehabilitation  Interventional Spine and Sports Physiatry  [facility]

## 2022-04-19 NOTE — OP REPORT
Date of Service: 4/19/2022     Patient: Balbina Randall 47 y.o. female     MRN: [id number]     Physician/s: Davide Bartholomew MD    Pre-operative Diagnosis: Lumbar radiculopathy    Post-operative Diagnosis: Lumbar radiculopathy    Procedure: left Lumbar Transforaminal Epidural Steroid  at the L3-4 and L4-5 levels.     Description of procedure:    The risks, benefits, and alternatives of the procedure were reviewed and discussed with the patient.  Written informed consent was freely obtained. A pre-procedural time-out was conducted by the physician verifying patient’s identity, procedure to be performed, procedure site and side, and allergy verification. Appropriate equipment was determined to be in place for the procedure.       The patient's vital signs were carefully monitored before, throughout, and after the procedure.     In the fluoroscopy suite the patient was placed in a prone position, a pillow placed underneath their umbilicus. The skin was prepped and draped in the usual sterile fashion.     The fluoroscope was placed over the lumbar spine and adjusted into the proper AP/Oblique view to enter the transforaminal space just adjacent to the pedicle at the levels below. The targets for injection were then marked at the left L3-4. A 27g 1.5 inch needle was placed into the marked site, and 1mL of 1% Lidocaine was injected subcutaneously into the epidermal and dermal layers. The needle was removed intact.  A 22g 5 inch spinal needle was then placed and advanced under fluoroscopic guidance in an oblique view towards the epidural space of the levels noted above. The needle position was confirmed to not be past the 6 o'clock position in the AP view and it was in the neuroforamen in the lateral view.        The fluoroscope was was then adjusted over the lumbar spine and adjusted into the proper AP/Oblique view to enter the transforaminal space adjacent to the pedicle at the LEFT  L4-5. A 27g 1.5 inch needle was  placed into the marked site, and 1mL of 1% Lidocaine was injected subcutaneously into the epidermal and dermal layers. The needle was removed intact.  A 22g 5 inch spinal needle was then placed and advanced under fluoroscopic guidance in an oblique view towards the epidural space of the levels noted above. The needle position was confirmed to not be past the 6 o'clock position in the AP view and it was in the neuroforamen in the lateral view.       Under live fluoroscopic guidance in the AP view, contrast dye was used to highlight the epidural space spread of each level above. Final fluoroscopic images were saved.  Following negative aspiration, 1mL of 1% lidocaine preservative free with 5mg dexamethasone   was then injected at each level above, and the needles were removed intact after restyleted. The patient's back was covered with a 4x4 gauze, the area was cleansed with sterile normal saline, and a dressing was applied. There were no complications noted.     This was a challenging procedure given the patients Body mass index is 43.09 kg/m².. This required longer needles.  A typical procedure such as this would require 25g 3.5 inch needles.  This procedure required 22g 5 inch needles.  This added to the mental effort for complexity this procedure.  This also made acquiring fluoroscopic images more time-consuming and challenging.  Final fluoroscopic images were obtained and saved.     The patient was then evaluated post-procedure, and was hemodynamically stable prior to leaving the post-operative care unit.     Follow-up as scheduled    Davide Bartholomew MD  Interventional Spine and Pain  Physical Medicine and Rehabilitation  [facility]          CPT codes  Transforaminal epidural injection- lumbar or sacral (first level):  00123-14  Transforaminal epidural injection- lumbar or sacral (each additional level):  04747-89

## 2022-04-19 NOTE — PROGRESS NOTES
Preprocedure assessment completed.  Pertinent health information(heartburn, WPW syndrome) communicated to physician and staff prior to time out.  Patient positioned preprocedure by RN, CSTand XRAY.  Foam wedge under ankles for support.

## 2022-04-19 NOTE — PROGRESS NOTES
0810 Pt admitted to Pre Procedure area.  Consent signed and post op instructions reviewed with pt.  Medical hx and allergies reviewed.  Hand off given to procedural RN prior to procedure.     0830 Dr. Bartholomew in to see pt.    0902 Pt received to Post Procedure area, report received from RN.  Vital signs taken, pt provided with snack.  Bandage intact, no drainage.  Ice pack offered.    0906 Pt seen by Dr. Bartholomew post procedure, orders received for discharge.    0926 Pt ambulated without difficulty, accompanied to car.  Discharged to designated .

## 2022-04-21 ENCOUNTER — TELEPHONE (OUTPATIENT)
Dept: PHYSICAL MEDICINE AND REHAB | Facility: MEDICAL CENTER | Age: 47
End: 2022-04-21
Payer: COMMERCIAL

## 2022-04-21 NOTE — TELEPHONE ENCOUNTER
Patient is post op Left L3-4 and L4-5 transforaminal epidural steroid injection with fluoro, called patient to follow up post procedure and sh states she is doing well and has no concerns post procedure.

## 2022-05-13 ENCOUNTER — HOSPITAL ENCOUNTER (OUTPATIENT)
Dept: RADIOLOGY | Facility: MEDICAL CENTER | Age: 47
End: 2022-05-13
Payer: COMMERCIAL

## 2022-05-16 ENCOUNTER — OFFICE VISIT (OUTPATIENT)
Dept: PHYSICAL MEDICINE AND REHAB | Facility: MEDICAL CENTER | Age: 47
End: 2022-05-16
Payer: COMMERCIAL

## 2022-05-16 VITALS
RESPIRATION RATE: 18 BRPM | OXYGEN SATURATION: 99 % | SYSTOLIC BLOOD PRESSURE: 126 MMHG | TEMPERATURE: 96.3 F | HEART RATE: 64 BPM | DIASTOLIC BLOOD PRESSURE: 74 MMHG

## 2022-05-16 DIAGNOSIS — M54.16 LUMBAR RADICULOPATHY: ICD-10-CM

## 2022-05-16 DIAGNOSIS — Z78.9 IMPAIRED MOBILITY AND ADLS: ICD-10-CM

## 2022-05-16 DIAGNOSIS — M47.816 LUMBAR SPONDYLOSIS: ICD-10-CM

## 2022-05-16 DIAGNOSIS — M25.559 CHRONIC HIP PAIN, UNSPECIFIED LATERALITY: ICD-10-CM

## 2022-05-16 DIAGNOSIS — Z74.09 IMPAIRED MOBILITY AND ADLS: ICD-10-CM

## 2022-05-16 DIAGNOSIS — G89.29 CHRONIC HIP PAIN, UNSPECIFIED LATERALITY: ICD-10-CM

## 2022-05-16 PROCEDURE — 99214 OFFICE O/P EST MOD 30 MIN: CPT | Performed by: PHYSICAL MEDICINE & REHABILITATION

## 2022-05-16 RX ORDER — TIZANIDINE 4 MG/1
4 TABLET ORAL NIGHTLY PRN
Qty: 30 TABLET | Refills: 5 | Status: SHIPPED | OUTPATIENT
Start: 2022-05-16 | End: 2022-08-29

## 2022-05-16 RX ORDER — GABAPENTIN 300 MG/1
300-600 CAPSULE ORAL 3 TIMES DAILY PRN
Qty: 180 CAPSULE | Refills: 5 | Status: SHIPPED | OUTPATIENT
Start: 2022-05-16 | End: 2022-08-29

## 2022-05-16 ASSESSMENT — PATIENT HEALTH QUESTIONNAIRE - PHQ9: CLINICAL INTERPRETATION OF PHQ2 SCORE: 0

## 2022-05-16 NOTE — PROGRESS NOTES
Follow up patient note  Interventional spine and Pain  Physiatry (physical medicine and  Rehabilitation)       Chief complaint:   Chief Complaint   Patient presents with   • Pain     Lumbar radiculopathy          HISTORY    Please see new patient note by Dr Bartholomew,  for more details.     HPI  Patient identification: Balbina Randall ,  1975,   With Diagnoses of Lumbar radiculopathy, Lumbar spondylosis, Chronic hip pain, and Impaired mobility and ADLs were pertinent to this visit.       Procedure:  2022 left L3-4 and L4-5 transforaminal epidural steroid injection.  60% improved postprocedure    Overall the patient is very happy and has had a significant improvement in her pain and function after the epidural steroid injection.  She is using her medications less frequently.  She has improved range of motion and ability to lift.  She still does have pain which is typically 2-3/2010 in intensity with intermittent pain.  She does have intermittent pain which can be worse with moderate pain but this is happening less frequently.  She is working with physical therapy and doing a home exercise program.      Medications tried include toradol, zanaflex, percocet, oral steroids, mobic.          ROS Red Flags :   Fever, Chills, Sweats: Denies  Involuntary Weight Loss: Denies  Bowel/Bladder Incontinence: Denies  Saddle Anesthesia: Denies        PMHx:   Past Medical History:   Diagnosis Date   • Airway problem 1/19/15    abnormal airway assessment as noted on procedure orders per MD (Dr. Serrato)   • Heart burn    • Indigestion    • Other specified symptom associated with female genital organs     ovarian cyst, adhesions   • Harleen-Parkinson-White syndrome    • WPW (Harleen-Parkinson-White syndrome) since birth    cardiologist, Dr. So       PSHx:   Past Surgical History:   Procedure Laterality Date   • LUMBAR TRANSFORAMINAL EPIDURAL STEROID INJECTION Left 2022    Procedure: LEFT L3-4 and L4-5  transforaminal epidural steroid injection with fluoroscopic guidance;  Surgeon: Davide Bartholomew M.D.;  [facility];  Service: Pain Management   • BLOCK EPIDURAL STEROID INJECTION Left 4/19/2022    Procedure: .;  Surgeon: Davide Bartholomew M.D.;  Location: [facility];  Service: Pain Management   • MT SHLDR ARTHROSCOP,PART ACROMIOPLAS Right 1/29/2020    Procedure: DECOMPRESSION, SHOULDER, ARTHROSCOPIC - SUBACROMIAL, EXTENSIVE DEBRIDEMENT;  Surgeon: Robert Park M.D.;  Location: [facility];  Service: Orthopedics   • CLAVICLE DISTAL EXCISION Right 1/29/2020    Procedure: EXCISION, CLAVICLE, DISTAL;  Surgeon: Robert Park M.D.;  Location: [facility];  Service: Orthopedics   • ROTATOR CUFF REPAIR Right 1/29/2020    Procedure: REPAIR, ROTATOR CUFF;  Surgeon: Robert Park M.D.;  Location: [facility];  Service: Orthopedics   • HYSTEROSCOPY NOVASURE-2  4/27/2017    Procedure: HYSTEROSCOPY NOVASURE;  Surgeon: Daxa Webb M.D.;  Location: [facility]ERY [facility];  Service:    • WRIST ARTHROSCOPY Right 1/13/2016    Procedure: WRIST ARTHROSCOPY with debridment  ;  Surgeon: Jose Daniel Redmond M.D.;  Location: [facility];  Service:    • LIGAMENT REPAIR  1/13/2016    Procedure: LIGAMENT REPAIR;  Surgeon: Jose Daniel Redmond M.D.;  Location: [facility]ERY [facility];  Service:    • COLONOSCOPY WITH BIOPSY  1/26/2015    Performed by Mauricio Serrato M.D. at [facility]   • GASTROSCOPY WITH BIOPSY  1/26/2015    Performed by Mauricio Serrato M.D. at [facility]   • HYSTEROSCOPY WITH VIDEO DIAGNOSTIC  5/24/2013    Performed by Robert Rosario M.D. at [facility]   • KIRT BY LAPAROSCOPY  9/2/2010    Performed by KIM ZHAO at [facility]   • PRIMARY C SECTION  4/30/2010    Performed by DAXA WEBB at [facility]   • BOWEL RESECTION  1992     "\"adhesions\"   • APPENDECTOMY  1992   • LAPAROSCOPY  1992, 2002   • OTHER      kenalog inj left foot       Family history   Family History   Problem Relation Age of Onset   • Hypertension Other    • Cancer Other          Medications:   Outpatient Medications Marked as Taking for the 5/16/22 encounter (Office Visit) with Davide Bartholomew M.D.   Medication Sig Dispense Refill   • tizanidine (ZANAFLEX) 4 MG Tab Take 1 Tablet by mouth at bedtime as needed (pain). 30 Tablet 5   • gabapentin (NEURONTIN) 300 MG Cap Take 1-2 Capsules by mouth 3 times a day as needed (pain). 180 Capsule 5   • estradiol (VAGIFEM) 10 MCG Tab insert one tablet vaginally two times a week     • acetaminophen (TYLENOL) 500 MG Tab Take 2 Tablets by mouth 3 times a day as needed (pain.  Do not exceed 3000 mg of total acetaminophen per day). 180 Tablet 6   • sucralfate (CARAFATE) 1 GM/10ML Suspension      • pantoprazole (PROTONIX) 40 MG Tablet Delayed Response           Current Outpatient Medications on File Prior to Visit   Medication Sig Dispense Refill   • estradiol (VAGIFEM) 10 MCG Tab insert one tablet vaginally two times a week     • acetaminophen (TYLENOL) 500 MG Tab Take 2 Tablets by mouth 3 times a day as needed (pain.  Do not exceed 3000 mg of total acetaminophen per day). 180 Tablet 6   • sucralfate (CARAFATE) 1 GM/10ML Suspension      • pantoprazole (PROTONIX) 40 MG Tablet Delayed Response        No current facility-administered medications on file prior to visit.         Allergies:   Allergies   Allergen Reactions   • Latex Rash     [id number]   • Other Drug Rash     Allergic to Estrogen IM - preservative within medication  [id number]   • Sulfa Drugs Rash and Itching     [id number]   • Sulfadiazine      Reaction: ITCHING Create Date: [id number] Create User Name: Lindsay Alvarez Update Date: [id number] Update User Name: Lindsay Alvarez       Social Hx:   Social History     Socioeconomic History   • Marital status:      Spouse name: Not " on file   • Number of children: Not on file   • Years of education: Not on file   • Highest education level: Not on file   Occupational History   • Not on file   Tobacco Use   • Smoking status: Never Smoker   • Smokeless tobacco: Never Used   Vaping Use   • Vaping Use: Never used   Substance and Sexual Activity   • Alcohol use: Yes     Alcohol/week: 0.0 oz     Comment: 3-4 month   • Drug use: No   • Sexual activity: Not on file   Other Topics Concern   • Not on file   Social History Narrative   • Not on file     Social Determinants of Health     Financial Resource Strain: Not on file   Food Insecurity: Not on file   Transportation Needs: Not on file   Physical Activity: Not on file   Stress: Not on file   Social Connections: Not on file   Intimate Partner Violence: Not on file   Housing Stability: Not on file         EXAMINATION     Physical Exam:   Vitals: /74 (BP Location: Right arm, Patient Position: Sitting, BP Cuff Size: Adult)   Pulse 64   Temp (!) 35.7 °C (96.3 °F) (Temporal)   Resp 18   SpO2 99%     Constitutional:   Body Habitus: There is no height or weight on file to calculate BMI.  Cooperation: Fully cooperates with exam  Appearance: Well-groomed no disheveled    Respiratory-  breathing comfortable on room air, no audible wheezing  Cardiovascular- capillary refills less than 2 seconds. No lower extremity edema is noted.   Psychiatric- alert and oriented ×3. Normal affect.    MSK and Neuro: -      Thoracic/Lumbar Spine/Sacral Spine/Hips   There are no signs of infection around the injection sites.   full  active range of motion with flexion, lateral flexion, and rotation bilaterally.   There is full  active range of motion with lumbar extension.      Palpation:   No tenderness to palpation in midline at T1-T12 levels. No tenderness to palpation in the left and right of the midline T1-L5, NEGATIVE for tenderness to palpation to the para-midline region in the lower lumbar levels.  palpation over  SI joint: negative bilaterally    palpation in hip or over the gluteus medius tendon insertion: negative bilaterally      Lumbar spine Special tests  Neuro tension  Straight leg test negative bilaterally    Slump test negative bilaterally      Key points for the international standards for neurological classification of spinal cord injury (ISNCSCI) to light touch.     Dermatome R L                                      L2 2 2   L3 2 2   L4 2 2   L5 2 2   S1 2 2   S2 2 2         Motor Exam Lower Extremities    ? Myotome R L   Hip flexion L2 5 5   Knee extension L3 5 5   Ankle dorsiflexion L4 5 5   Toe extension L5 5 5   Ankle plantarflexion S1 5 5             MEDICAL DECISION MAKING    DATA    Labs:   Lab Results   Component Value Date/Time    SODIUM 142 05/11/2021 05:10 PM    POTASSIUM 4.1 05/11/2021 05:10 PM    CHLORIDE 109 05/11/2021 05:10 PM    CO2 24 05/11/2021 05:10 PM    GLUCOSE 109 (H) 05/11/2021 05:10 PM    BUN 25 (H) 05/11/2021 05:10 PM    CREATININE 0.97 05/11/2021 05:10 PM        Lab Results   Component Value Date/Time    PROTHROMBTM 12.8 05/18/2012 08:57 AM    INR 0.95 05/18/2012 08:57 AM        Lab Results   Component Value Date/Time    WBC 8.7 05/11/2021 05:10 PM    RBC 4.60 05/11/2021 05:10 PM    HEMOGLOBIN 14.0 05/11/2021 05:10 PM    HEMATOCRIT 43.6 05/11/2021 05:10 PM    MCV 94.8 05/11/2021 05:10 PM    MCH 30.4 05/11/2021 05:10 PM    MCHC 32.1 (L) 05/11/2021 05:10 PM    MPV 12.3 05/11/2021 05:10 PM    NEUTSPOLYS 53.00 05/11/2021 05:10 PM    LYMPHOCYTES 33.90 05/11/2021 05:10 PM    MONOCYTES 7.90 05/11/2021 05:10 PM    EOSINOPHILS 4.00 05/11/2021 05:10 PM    BASOPHILS 0.70 05/11/2021 05:10 PM        No results found for: HBA1C       Imaging:   I personally reviewed following images    MRI lumbar spine 4/14/2022  There are no areas of high-grade neuroforaminal or central canal stenosis.  There is facet arthropathy worst on the left at L3-4, L4-5, L5-S1.  Degenerative disc disease at L4-5 and L5-S1.   High intensity zone's at the left paracentric region at L4-5 and L5-S1 which can be consistent with an annular tear.      MRI lumbar spine 7/27/2015  No areas of high-grade central stenosis.  Tiny disc protrusions at L4-5 and L5-S1.    I reviewed the following radiology reports    MRI left hip 7/27/2015  IMPRESSION:   1.  Gluteal tendinopathy and greater trochanteric bursitis.  2.  Ischia femoral impingement.   3.  3.9 cm left ovarian cyst.           Results for orders placed during the hospital encounter of 04/14/22    MR-LUMBAR SPINE-W/O    Impression  1.  Mild degenerative disc disease at L4-5 and L5-S1 as described. Central annular fissure posterior disc is unchanged at both levels.    2.  Facet arthropathy at L3-L4, left greater than right.                                                        Results for orders placed during the hospital encounter of 04/23/21    CT-CHEST (THORAX) WITH    Impression  1.  No evidence of focal consolidation or pleural effusion.    2.  No evidence of mediastinal or hilar adenopathy.    3.  Fatty liver.    4.  Prior cholecystectomy.            Results for orders placed during the hospital encounter of 05/02/18    CT-ABDOMEN WITH & W/O    Impression  Unremarkable CT scan of the abdomen with attention to the pancreas.    Status post cholecystectomy.       Results for orders placed during the hospital encounter of 07/01/16    CT-ABDOMEN-PELVIS WITH    Impression  Unremarkable contrast-enhanced CT of the abdomen and pelvis though appendix not visualized.                   Results for orders placed during the hospital encounter of 10/03/14    DX-ANKLE 3+ VIEWS    Impression  1.  Soft tissue swelling with no acute fracture.  2.  Mild degenerative changes talonavicular joint and possible focal area of osteonecrosis articular surface talus.        Results for orders placed in visit on 11/08/21    DX-ELBOW-LIMITED 2- LEFT             Results for orders placed during the hospital encounter  of 04/07/15    DX-HIP-COMPLETE - UNILATERAL 2+    Impression  Unremarkable examination of the left hip.   Results for orders placed during the hospital encounter of 09    DX-HIPS-COMPLETE - BILATERAL 3+    Impression  IMPRESSION:    NEGATIVE EXAMINATION OF BOTH HIPS.    SST:dxm      Read By MISHA FIGUEROA MD on 2009  4:37PM  : DXM Transcription Date: 2009  8:27PM  THIS DOCUMENT HAS BEEN ELECTRONICALLY SIGNED BY: MISHA FIGUEROA MD on  2009  2:34PM        Results for orders placed during the hospital encounter of 22    DX-LUMBAR SPINE-2 OR 3 VIEWS    Impression  1.  Minimal retrolisthesis of L1 on L2.  2.  Mild degenerative changes at L5/S1.  3.  Mild facet arthropathy in the lower lumbar spine.              Results for orders placed during the hospital encounter of 09    DX-THORACIC SPINE-WITH SWIMMERS VIEW    Impression  IMPRESSION:    1. MINIMAL ANTERIOR ENDPLATE OSTEOPHYTOSIS INFERIORLY.  OTHERWISE  NEGATIVE EXAMINATION OF THE THORACIC SPINE.    SST:dxm       Results for orders placed during the hospital encounter of 04/20/15    DX-WRIST-LIMITED 2-    Impression  No acute osseous abnormality is identified.        DIAGNOSIS   Visit Diagnoses     ICD-10-CM   1. Lumbar radiculopathy  M54.16   2. Lumbar spondylosis  M47.816   3. Chronic hip pain  M25.559    G89.29   4. Impaired mobility and ADLs  Z74.09    Z78.9         ASSESSMENT and PLAN:     [facility]  1975 [facility]      Balbina was seen today for pain.    Diagnoses and all orders for this visit:    Lumbar radiculopathy  -     tizanidine (ZANAFLEX) 4 MG Tab; Take 1 Tablet by mouth at bedtime as needed (pain).  -     gabapentin (NEURONTIN) 300 MG Cap; Take 1-2 Capsules by mouth 3 times a day as needed (pain).    Lumbar spondylosis  -     tizanidine (ZANAFLEX) 4 MG Tab; Take 1 Tablet by mouth at bedtime as needed (pain).  -     gabapentin (NEURONTIN) 300 MG Cap; Take 1-2 Capsules by mouth 3  times a day as needed (pain).    Chronic hip pain  -     tizanidine (ZANAFLEX) 4 MG Tab; Take 1 Tablet by mouth at bedtime as needed (pain).  -     gabapentin (NEURONTIN) 300 MG Cap; Take 1-2 Capsules by mouth 3 times a day as needed (pain).    Impaired mobility and ADLs  -     tizanidine (ZANAFLEX) 4 MG Tab; Take 1 Tablet by mouth at bedtime as needed (pain).  -     gabapentin (NEURONTIN) 300 MG Cap; Take 1-2 Capsules by mouth 3 times a day as needed (pain).        The patient has significant improvement in her pain and function.  She still does have pain and intermittent moderate to severe pain that can radiate down the legs.  This canal also affect the right side.  She is working with physical therapy.  Overall the patient has had a significant improvement in pain and function after the epidural steroid injection and can now tolerate her physical therapy program.    If the patient has a recurrence of pain then I would plan to see her in clinic urgently for an evaluation and consideration of additional epidural steroid injection.    Follow up: As needed with me after the conservative treatments    Thank you for allowing me to participate in the care of this patient. If you have any questions please not hesitate to contact me.             Please note that this dictation was created using voice recognition software. I have made every reasonable attempt to correct obvious errors but there may be errors of grammar and content that I may have overlooked prior to finalization of this note.      Davide Bartholomew MD  Interventional Spine and Sports Physiatry  Physical Medicine and Rehabilitation  [facility]

## 2022-06-27 ENCOUNTER — APPOINTMENT (OUTPATIENT)
Dept: PHYSICAL THERAPY | Facility: REHABILITATION | Age: 47
End: 2022-06-27
Attending: PHYSICAL MEDICINE & REHABILITATION
Payer: COMMERCIAL

## 2022-08-29 ENCOUNTER — OFFICE VISIT (OUTPATIENT)
Dept: PHYSICAL MEDICINE AND REHAB | Facility: MEDICAL CENTER | Age: 47
End: 2022-08-29
Payer: COMMERCIAL

## 2022-08-29 VITALS
TEMPERATURE: 97.9 F | SYSTOLIC BLOOD PRESSURE: 118 MMHG | HEIGHT: 66 IN | BODY MASS INDEX: 41.1 KG/M2 | OXYGEN SATURATION: 90 % | WEIGHT: 255.73 LBS | DIASTOLIC BLOOD PRESSURE: 78 MMHG | HEART RATE: 86 BPM

## 2022-08-29 DIAGNOSIS — Z74.09 IMPAIRED MOBILITY AND ADLS: ICD-10-CM

## 2022-08-29 DIAGNOSIS — M51.36 LUMBAR DEGENERATIVE DISC DISEASE: ICD-10-CM

## 2022-08-29 DIAGNOSIS — Z78.9 IMPAIRED MOBILITY AND ADLS: ICD-10-CM

## 2022-08-29 DIAGNOSIS — G89.29 CHRONIC HIP PAIN, UNSPECIFIED LATERALITY: ICD-10-CM

## 2022-08-29 DIAGNOSIS — M47.816 LUMBAR SPONDYLOSIS: Primary | ICD-10-CM

## 2022-08-29 DIAGNOSIS — M25.559 CHRONIC HIP PAIN, UNSPECIFIED LATERALITY: ICD-10-CM

## 2022-08-29 DIAGNOSIS — M54.16 LUMBAR RADICULOPATHY: ICD-10-CM

## 2022-08-29 PROCEDURE — 99214 OFFICE O/P EST MOD 30 MIN: CPT | Performed by: PHYSICAL MEDICINE & REHABILITATION

## 2022-08-29 RX ORDER — GABAPENTIN 300 MG/1
300-600 CAPSULE ORAL 3 TIMES DAILY PRN
Qty: 180 CAPSULE | Refills: 5 | Status: SHIPPED | OUTPATIENT
Start: 2022-08-29 | End: 2022-11-21

## 2022-08-29 RX ORDER — TIZANIDINE 4 MG/1
4 TABLET ORAL NIGHTLY PRN
Qty: 30 TABLET | Refills: 5 | Status: SHIPPED | OUTPATIENT
Start: 2022-08-29 | End: 2023-11-20 | Stop reason: SDUPTHER

## 2022-08-29 ASSESSMENT — PAIN SCALES - GENERAL: PAINLEVEL: 4=SLIGHT-MODERATE PAIN

## 2022-08-29 ASSESSMENT — FIBROSIS 4 INDEX: FIB4 SCORE: 1.03

## 2022-08-29 ASSESSMENT — PATIENT HEALTH QUESTIONNAIRE - PHQ9: CLINICAL INTERPRETATION OF PHQ2 SCORE: 0

## 2022-08-29 NOTE — PROGRESS NOTES
Follow up patient note  Interventional spine and Pain  Physiatry (physical medicine and  Rehabilitation)       Chief complaint:   Chief Complaint   Patient presents with    Follow-Up     Back pain          HISTORY    Please see new patient note by Dr Bartholomew,  for more details.     HPI  Patient identification: Balbina Randall ,  1975,   With The primary encounter diagnosis was Lumbar spondylosis. Diagnoses of Lumbar radiculopathy, Chronic hip pain, Impaired mobility and ADLs, and Lumbar degenerative disc disease were also pertinent to this visit.       Procedure:  2022 left L3-4 and L4-5 transforaminal epidural steroid injection.  60% improved postprocedure    The patient continues to have pain relief after the epidural steroid injection.  She is not having radicular component to her pain.  Majority patient's pain is bilateral axial aching low back pain moderate in intensity nonradiating worse with lumbar extension worse with bending and lifting causing functional deficits.    Pain is been for the past greater than 6 months.  The patient is done a comprehensive pain program including the past 4 months.      Medications tried include toradol, zanaflex, percocet, oral steroids, mobic.          ROS Red Flags :   Fever, Chills, Sweats: Denies  Involuntary Weight Loss: Denies  Bowel/Bladder Incontinence: Denies  Saddle Anesthesia: Denies        PMHx:   Past Medical History:   Diagnosis Date    Airway problem 1/19/15    abnormal airway assessment as noted on procedure orders per MD (Dr. Serrato)    Heart burn     Indigestion     Other specified symptom associated with female genital organs     ovarian cyst, adhesions    Harleen-Parkinson-White syndrome     WPW (Harleen-Parkinson-White syndrome) since birth    cardiologist, Dr. So       PSHx:   Past Surgical History:   Procedure Laterality Date    LUMBAR TRANSFORAMINAL EPIDURAL STEROID INJECTION Left 2022    Procedure: LEFT L3-4 and L4-5  "transforaminal epidural steroid injection with fluoroscopic guidance;  Surgeon: Davide Bartholomew M.D.;  [facility];  Service: Pain Management    BLOCK EPIDURAL STEROID INJECTION Left 4/19/2022    Procedure: .;  Surgeon: Davide Bartholomew M.D.;  Location[facility];  Service: Pain Management    MO SHLDR ARTHROSCOP,PART ACROMIOPLAS Right 1/29/2020    Procedure: DECOMPRESSION, SHOULDER, ARTHROSCOPIC - SUBACROMIAL, EXTENSIVE DEBRIDEMENT;  Surgeon: Robert Park M.D.;  Location: [facility];  Service: Orthopedics    CLAVICLE DISTAL EXCISION Right 1/29/2020    Procedure: EXCISION, CLAVICLE, DISTAL;  Surgeon: Robert Park M.D.;  Location: [facility];  Service: Orthopedics    ROTATOR CUFF REPAIR Right 1/29/2020    Procedure: REPAIR, ROTATOR CUFF;  Surgeon: Robert Park M.D.;  Location: [facility];  Service: Orthopedics    HYSTEROSCOPY NOVASURE-2  4/27/2017    Procedure: HYSTEROSCOPY NOVASURE;  Surgeon: Daxa Webb M.D.;  Location: SUR[facility]ERY [facility] DAY [facility];  Service:     WRIST ARTHROSCOPY Right 1/13/2016    Procedure: WRIST ARTHROSCOPY with debridment  ;  Surgeon: Jose Daniel Redmond M.D.;  Location: [facility];  Service:     LIGAMENT REPAIR  1/13/2016    Procedure: LIGAMENT REPAIR;  Surgeon: Jose Daniel Redmond M.D.;  Location: SURGERY [facility];  Service:     COLONOSCOPY WITH BIOPSY  1/26/2015    Performed by Mauricio Serrato M.D. at [facility]    GASTROSCOPY WITH BIOPSY  1/26/2015    Performed by Mauricio Serrato M.D. at [facility]    HYSTEROSCOPY WITH VIDEO DIAGNOSTIC  5/24/2013    Performed by Robert Rosario M.D. at [facility]    KIRT BY LAPAROSCOPY  9/2/2010    Performed by KIM ZHAO at [facility]    PRIMARY C SECTION  4/30/2010    Performed by DAXA WEBB at [facility]    BOWEL RESECTION  1992    \"adhesions\"    " APPENDECTOMY  1992    LAPAROSCOPY  1992, 2002    OTHER      kenalog inj left foot       Family history   Family History   Problem Relation Age of Onset    Hypertension Other     Cancer Other          Medications:   Outpatient Medications Marked as Taking for the 8/29/22 encounter (Office Visit) with Davide Bartholomew M.D.   Medication Sig Dispense Refill    tizanidine (ZANAFLEX) 4 MG Tab Take 1 Tablet by mouth at bedtime as needed (pain). 30 Tablet 5    gabapentin (NEURONTIN) 300 MG Cap Take 1-2 Capsules by mouth 3 times a day as needed (pain). 180 Capsule 5    pantoprazole (PROTONIX) 40 MG Tablet Delayed Response           Current Outpatient Medications on File Prior to Visit   Medication Sig Dispense Refill    pantoprazole (PROTONIX) 40 MG Tablet Delayed Response       estradiol (VAGIFEM) 10 MCG Tab insert one tablet vaginally two times a week      acetaminophen (TYLENOL) 500 MG Tab Take 2 Tablets by mouth 3 times a day as needed (pain.  Do not exceed 3000 mg of total acetaminophen per day). 180 Tablet 6    sucralfate (CARAFATE) 1 GM/10ML Suspension        No current facility-administered medications on file prior to visit.         Allergies:   Allergies   Allergen Reactions    Latex Rash     [id number]    Other Drug Rash     Allergic to Estrogen IM - preservative within medication  [id number]    Sulfa Drugs Rash and Itching     [id number]    Sulfadiazine      Reaction: ITCHING Create Date: [id number] Create User Name: Lindsay Alvarez Update Date: [id number] Update User Name: Lindsay Alvarez       Social Hx:   Social History     Socioeconomic History    Marital status:      Spouse name: Not on file    Number of children: Not on file    Years of education: Not on file    Highest education level: Not on file   Occupational History    Not on file   Tobacco Use    Smoking status: Never    Smokeless tobacco: Never   Vaping Use    Vaping Use: Never used   Substance and Sexual Activity    Alcohol use: Yes      "Alcohol/week: 0.0 oz     Comment: 3-4 month    Drug use: No    Sexual activity: Not on file   Other Topics Concern    Not on file   Social History Narrative    Not on file     Social Determinants of Health     Financial Resource Strain: Not on file   Food Insecurity: Not on file   Transportation Needs: Not on file   Physical Activity: Not on file   Stress: Not on file   Social Connections: Not on file   Intimate Partner Violence: Not on file   Housing Stability: Not on file         EXAMINATION     Physical Exam:   Vitals: /78 (BP Location: Right arm, Patient Position: Sitting, BP Cuff Size: Adult)   Pulse 86   Temp 36.6 °C (97.9 °F) (Temporal)   Ht 1.676 m (5' 6\")   Wt 116 kg (255 lb 11.7 oz)   SpO2 90%     Constitutional:   Body Habitus: Body mass index is 41.28 kg/m².  Cooperation: Fully cooperates with exam  Appearance: Well-groomed no disheveled    Respiratory-  breathing comfortable on room air, no audible wheezing  Cardiovascular- capillary refills less than 2 seconds. No lower extremity edema is noted.   Psychiatric- alert and oriented ×3. Normal affect.    MSK and Neuro: -      Thoracic/Lumbar Spine/Sacral Spine/Hips   There are no signs of infection around the injection sites.   decreased active range of motion with flexion, lateral flexion, and rotation bilaterally.   There is decreased active range of motion with lumbar extension.    There is  pain with lumbar extension.   There is pain with facet loading maneuver (extension rotation) with axial low back pain on the BILATERAL side(s)    Palpation:   No tenderness to palpation in midline at T1-T12 levels. No tenderness to palpation in the left and right of the midline T1-L5, NEGATIVE for tenderness to palpation to the para-midline region in the lower lumbar levels.  palpation over SI joint: negative bilaterally    palpation in hip or over the gluteus medius tendon insertion: negative bilaterally      Lumbar spine Special tests  Neuro " tension  Straight leg test negative bilaterally    Slump test negative bilaterally      Key points for the international standards for neurological classification of spinal cord injury (ISNCSCI) to light touch.     Dermatome R L                                      L2 2 2   L3 2 2   L4 2 2   L5 2 2   S1 2 2   S2 2 2         Motor Exam Lower Extremities    ? Myotome R L   Hip flexion L2 5 5   Knee extension L3 5 5   Ankle dorsiflexion L4 5 5   Toe extension L5 5 5   Ankle plantarflexion S1 5 5           MEDICAL DECISION MAKING    DATA    Labs:   Lab Results   Component Value Date/Time    SODIUM 142 05/11/2021 05:10 PM    POTASSIUM 4.1 05/11/2021 05:10 PM    CHLORIDE 109 05/11/2021 05:10 PM    CO2 24 05/11/2021 05:10 PM    GLUCOSE 109 (H) 05/11/2021 05:10 PM    BUN 25 (H) 05/11/2021 05:10 PM    CREATININE 0.97 05/11/2021 05:10 PM        Lab Results   Component Value Date/Time    PROTHROMBTM 12.8 05/18/2012 08:57 AM    INR 0.95 05/18/2012 08:57 AM        Lab Results   Component Value Date/Time    WBC 8.7 05/11/2021 05:10 PM    RBC 4.60 05/11/2021 05:10 PM    HEMOGLOBIN 14.0 05/11/2021 05:10 PM    HEMATOCRIT 43.6 05/11/2021 05:10 PM    MCV 94.8 05/11/2021 05:10 PM    MCH 30.4 05/11/2021 05:10 PM    MCHC 32.1 (L) 05/11/2021 05:10 PM    MPV 12.3 05/11/2021 05:10 PM    NEUTSPOLYS 53.00 05/11/2021 05:10 PM    LYMPHOCYTES 33.90 05/11/2021 05:10 PM    MONOCYTES 7.90 05/11/2021 05:10 PM    EOSINOPHILS 4.00 05/11/2021 05:10 PM    BASOPHILS 0.70 05/11/2021 05:10 PM        No results found for: HBA1C       Imaging:   I personally reviewed following images    MRI lumbar spine 4/14/2022  There are no areas of high-grade neuroforaminal or central canal stenosis.  There is facet arthropathy worst on the left at L3-4, L4-5, L5-S1.  Degenerative disc disease at L4-5 and L5-S1.  High intensity zone's at the left paracentric region at L4-5 and L5-S1 which can be consistent with an annular tear.      MRI lumbar spine 7/27/2015  No areas  of high-grade central stenosis.  Tiny disc protrusions at L4-5 and L5-S1.    I reviewed the following radiology reports    MRI left hip 7/27/2015  IMPRESSION:   1.  Gluteal tendinopathy and greater trochanteric bursitis.  2.  Ischia femoral impingement.   3.  3.9 cm left ovarian cyst.           Results for orders placed during the hospital encounter of 04/14/22    MR-LUMBAR SPINE-W/O    Impression  1.  Mild degenerative disc disease at L4-5 and L5-S1 as described. Central annular fissure posterior disc is unchanged at both levels.    2.  Facet arthropathy at L3-L4, left greater than right.                                                        Results for orders placed during the hospital encounter of 04/23/21    CT-CHEST (THORAX) WITH    Impression  1.  No evidence of focal consolidation or pleural effusion.    2.  No evidence of mediastinal or hilar adenopathy.    3.  Fatty liver.    4.  Prior cholecystectomy.            Results for orders placed during the hospital encounter of 05/02/18    CT-ABDOMEN WITH & W/O    Impression  Unremarkable CT scan of the abdomen with attention to the pancreas.    Status post cholecystectomy.       Results for orders placed during the hospital encounter of 07/01/16    CT-ABDOMEN-PELVIS WITH    Impression  Unremarkable contrast-enhanced CT of the abdomen and pelvis though appendix not visualized.                   Results for orders placed during the hospital encounter of 10/03/14    DX-ANKLE 3+ VIEWS    Impression  1.  Soft tissue swelling with no acute fracture.  2.  Mild degenerative changes talonavicular joint and possible focal area of osteonecrosis articular surface talus.        Results for orders placed in visit on 11/08/21    DX-ELBOW-LIMITED 2- LEFT             Results for orders placed during the hospital encounter of 04/07/15    DX-HIP-COMPLETE - UNILATERAL 2+    Impression  Unremarkable examination of the left hip.   Results for orders placed during the hospital  encounter of 09    DX-HIPS-COMPLETE - BILATERAL 3+    Impression  IMPRESSION:    NEGATIVE EXAMINATION OF BOTH HIPS.    SST:dxm      Read By MISHA FIGUEROA MD on 2009  4:37PM  : DXM Transcription Date: 2009  8:27PM  THIS DOCUMENT HAS BEEN ELECTRONICALLY SIGNED BY: MISHA FIGUEROA MD on  2009  2:34PM        Results for orders placed during the hospital encounter of 22    DX-LUMBAR SPINE-2 OR 3 VIEWS    Impression  1.  Minimal retrolisthesis of L1 on L2.  2.  Mild degenerative changes at L5/S1.  3.  Mild facet arthropathy in the lower lumbar spine.              Results for orders placed during the hospital encounter of 09    DX-THORACIC SPINE-WITH SWIMMERS VIEW    Impression  IMPRESSION:    1. MINIMAL ANTERIOR ENDPLATE OSTEOPHYTOSIS INFERIORLY.  OTHERWISE  NEGATIVE EXAMINATION OF THE THORACIC SPINE.    SST:dxm       Results for orders placed during the hospital encounter of 04/20/15    DX-WRIST-LIMITED 2-    Impression  No acute osseous abnormality is identified.        DIAGNOSIS   Visit Diagnoses     ICD-10-CM   1. Lumbar spondylosis  M47.816   2. Lumbar radiculopathy  M54.16   3. Chronic hip pain  M25.559    G89.29   4. Impaired mobility and ADLs  Z74.09    Z78.9   5. Lumbar degenerative disc disease  M51.36         ASSESSMENT and PLAN:     Balbina Brito Prakash  1975 female      Balbina was seen today for follow-up.    Diagnoses and all orders for this visit:    Lumbar spondylosis  Comments:  Not controlled, see below  Orders:  -     Referral to Physical Medicine Rehab  -     Referral to Physical Medicine Rehab  -     tizanidine (ZANAFLEX) 4 MG Tab; Take 1 Tablet by mouth at bedtime as needed (pain).  -     gabapentin (NEURONTIN) 300 MG Cap; Take 1-2 Capsules by mouth 3 times a day as needed (pain).    Lumbar radiculopathy  Comments:  Stable, improved after epidural steroid injection  Orders:  -     tizanidine (ZANAFLEX) 4 MG Tab; Take 1 Tablet by  mouth at bedtime as needed (pain).  -     gabapentin (NEURONTIN) 300 MG Cap; Take 1-2 Capsules by mouth 3 times a day as needed (pain).    Chronic hip pain  -     tizanidine (ZANAFLEX) 4 MG Tab; Take 1 Tablet by mouth at bedtime as needed (pain).  -     gabapentin (NEURONTIN) 300 MG Cap; Take 1-2 Capsules by mouth 3 times a day as needed (pain).    Impaired mobility and ADLs  -     tizanidine (ZANAFLEX) 4 MG Tab; Take 1 Tablet by mouth at bedtime as needed (pain).  -     gabapentin (NEURONTIN) 300 MG Cap; Take 1-2 Capsules by mouth 3 times a day as needed (pain).    Lumbar degenerative disc disease  Comments:  Stable, continue home exercise program     The patient is failed conservative treatments with medication management, home exercise program from the direction of physical therapy/physician including the past 6 months .  I have ordered a BILATERAL diagnostic medial branch block targeting the L3-4 and L4-5 facet joints #1 and #2.  Procedure #2 is only to be done if #1 is a positive block.    The risks benefits and alternatives to this procedure were discussed and the patient wishes to proceed with the procedure. Risks include but are not limited to damage to surrounding structures, infection, bleeding, worsening of pain which can be permanent, weakness which can be permanent. Benefits include pain relief, improved function. Alternatives includes not doing the procedure.   If there are 2 positive blocks I would consider the patient for radiofrequency neurotomy of the previously blocked nerves.      Follow up: After the above diagnostic procedures    Thank you for allowing me to participate in the care of this patient. If you have any questions please not hesitate to contact me.             Please note that this dictation was created using voice recognition software. I have made every reasonable attempt to correct obvious errors but there may be errors of grammar and content that I may have overlooked prior to  finalization of this note.      Davide Bartholomew MD  Interventional Spine and Sports Physiatry  Physical Medicine and Rehabilitation  [facility]

## 2022-08-29 NOTE — PATIENT INSTRUCTIONS
Your procedure will be at the [facility] special procedure suite.    [facility] [place] [address]       PRE-PROCEDURE INSTRUCTIONS  You may take your regular medications except:   No Anti-inflammatories 5 days prior to your procedure. Anti-inflammatories include medicines such as  ibuprofen (Motrin, Advil), Excedrin, Naproxen (Aleve, Anaprox, Naprelan, Naprosyn), Celecoxib (Celebrex), Diclofenac (Voltaren-XR tab), and Meloxicam (Mobic).   You can take the remainder of your pain medications as prescribed.   If you are having a diagnostic procedure such as a medial branch block, do not use her pain meds on the day of the procedure  No Glucophage or Metformin 24 hours before your procedure. You may resume next day after your procedure.  Call the physiatry office if you are taking or prescribed anti-biotics within five days of procedure.  Please ask provider if you are taking any new diabetes medication.  CONTINUE TAKING BLOOD PRESSURE MEDICATIONS AS PRESCRIBED.  Pain medications will not be prescribed on the procedure day. Procedural pain medication may be used by your provider   Call your doctor's office performing the procedure if you have a fever, chills, rash or new illness prior to your procedure    Anticoagulation/antiplatelet medications  No Blood thinning medications such as Coumadin, Xarelto, aspirin or Plavix 5 days prior to procedure unless your doctor said to continue these medications. Call your doctor if a new medication is prescribed in this class.     Restrictions for eating before procedure:   If you are getting procedural sedation, then do not eat to for 8 hours prior to procedure appointment time. Do not drink fluids for four hours prior to your procedure time.   If you are not having procedural sedation, then Skip the meal prior to your procedure. If you have a morning procedure then skip breakfast. If you have an afternoon procedure then skip lunch.   You may drink clear liquids  up to 2 hours prior to your procedure  You must have a  the day of procedure to accompany you home.      POST PROCEDURE INSTRUCTIONS   No heavy lifting, strenuous bending or strenuous exercise for 3 days after your procedure.  No hot tubs, baths, swimming for 3 days after your procedure  You can remove the bandage the day after the procedure.  IF YOU RECEIVED A DIAGNOSTIC PROCEDURE (SUCH AS A MEDIAL BRANCH BLOCK), PLEASE NOTE THAT WE DO EXPECT THIS INJECTION TO WEAR OFF.  IT IS IMPORTANT TO COMPLETE THE PAIN DIARY AND LIST THE PAIN SCORE ONLY FOR THE REGION WHERE THE PROCEDURE WAS AND BRING THIS TO YOUR FOLLOW UP VISIT.  IF YOU EXPERIENCE PROLONGED WEAKNESS LONGER THAN ONE DAY, BOWEL OR BLADDER INCONTINENCE THEN PLEASE CALL THE PHYSIATRY OFFICE.  Your leg may feel heavy, weak and numb for up to 1-2 days. Be very careful walking.    You may resume normal activities 3 days after procedure.

## 2022-08-29 NOTE — H&P (VIEW-ONLY)
Follow up patient note  Interventional spine and Pain  Physiatry (physical medicine and  Rehabilitation)       Chief complaint:   Chief Complaint   Patient presents with    Follow-Up     Back pain          HISTORY    Please see new patient note by Dr Bartholomew,  for more details.     HPI  Patient identification: Balbina Randall ,  1975,   With The primary encounter diagnosis was Lumbar spondylosis. Diagnoses of Lumbar radiculopathy, Chronic hip pain, Impaired mobility and ADLs, and Lumbar degenerative disc disease were also pertinent to this visit.       Procedure:  2022 left L3-4 and L4-5 transforaminal epidural steroid injection.  60% improved postprocedure    The patient continues to have pain relief after the epidural steroid injection.  She is not having radicular component to her pain.  Majority patient's pain is bilateral axial aching low back pain moderate in intensity nonradiating worse with lumbar extension worse with bending and lifting causing functional deficits.    Pain is been for the past greater than 6 months.  The patient is done a comprehensive pain program including the past 4 months.      Medications tried include toradol, zanaflex, percocet, oral steroids, mobic.          ROS Red Flags :   Fever, Chills, Sweats: Denies  Involuntary Weight Loss: Denies  Bowel/Bladder Incontinence: Denies  Saddle Anesthesia: Denies        PMHx:   Past Medical History:   Diagnosis Date    Airway problem 1/19/15    abnormal airway assessment as noted on procedure orders per MD (Dr. Serrato)    Heart burn     Indigestion     Other specified symptom associated with female genital organs     ovarian cyst, adhesions    Harleen-Parkinson-White syndrome     WPW (Harleen-Parkinson-White syndrome) since birth    cardiologist, Dr. So       PSHx:   Past Surgical History:   Procedure Laterality Date    LUMBAR TRANSFORAMINAL EPIDURAL STEROID INJECTION Left 2022    Procedure: LEFT L3-4 and L4-5  "transforaminal epidural steroid injection with fluoroscopic guidance;  Surgeon: Davide Bartholomew M.D.;  [facility];  Service: Pain Management    BLOCK EPIDURAL STEROID INJECTION Left 4/19/2022    Procedure: .;  Surgeon: Davide Bartholomew M.D.;  Location: [facility];  Service: Pain Management    WA SHLDR ARTHROSCOP,PART ACROMIOPLAS Right 1/29/2020    Procedure: DECOMPRESSION, SHOULDER, ARTHROSCOPIC - SUBACROMIAL, EXTENSIVE DEBRIDEMENT;  Surgeon: Robert Park M.D.;  Location: [facility];  Service: Orthopedics    CLAVICLE DISTAL EXCISION Right 1/29/2020    Procedure: EXCISION, CLAVICLE, DISTAL;  Surgeon: Robert Park M.D.;  Location: [facility];  Service: Orthopedics    ROTATOR CUFF REPAIR Right 1/29/2020    Procedure: REPAIR, ROTATOR CUFF;  Surgeon: Robert Park M.D.;  Location: [facility];  Service: Orthopedics    HYSTEROSCOPY NOVASURE-2  4/27/2017    Procedure: HYSTEROSCOPY NOVASURE;  Surgeon: Daxa Webb M.D.;  Location: SUR[facility]ERY [facility];  Service:     WRIST ARTHROSCOPY Right 1/13/2016    Procedure: WRIST ARTHROSCOPY with debridment  ;  Surgeon: Jose Daniel Redmond M.D.;  Location: [facility];  Service:     LIGAMENT REPAIR  1/13/2016    Procedure: LIGAMENT REPAIR;  Surgeon: Jose Daniel Redmond M.D.;  Location: SURGERY [facility];  Service:     COLONOSCOPY WITH BIOPSY  1/26/2015    Performed by Mauricio Serrato M.D. at [facility]    GASTROSCOPY WITH BIOPSY  1/26/2015    Performed by Mauricio Serrato M.D. at [facility]    HYSTEROSCOPY WITH VIDEO DIAGNOSTIC  5/24/2013    Performed by Robert Rosario M.D. at [facility]    KIRT BY LAPAROSCOPY  9/2/2010    Performed by KIM ZHAO at [facility]    PRIMARY C SECTION  4/30/2010    Performed by DAXA WEBB at [facility]    BOWEL RESECTION  1992    \"adhesions\"    " APPENDECTOMY  1992    LAPAROSCOPY  1992, 2002    OTHER      kenalog inj left foot       Family history   Family History   Problem Relation Age of Onset    Hypertension Other     Cancer Other          Medications:   Outpatient Medications Marked as Taking for the 8/29/22 encounter (Office Visit) with Davide Bartholomew M.D.   Medication Sig Dispense Refill    tizanidine (ZANAFLEX) 4 MG Tab Take 1 Tablet by mouth at bedtime as needed (pain). 30 Tablet 5    gabapentin (NEURONTIN) 300 MG Cap Take 1-2 Capsules by mouth 3 times a day as needed (pain). 180 Capsule 5    pantoprazole (PROTONIX) 40 MG Tablet Delayed Response           Current Outpatient Medications on File Prior to Visit   Medication Sig Dispense Refill    pantoprazole (PROTONIX) 40 MG Tablet Delayed Response       estradiol (VAGIFEM) 10 MCG Tab insert one tablet vaginally two times a week      acetaminophen (TYLENOL) 500 MG Tab Take 2 Tablets by mouth 3 times a day as needed (pain.  Do not exceed 3000 mg of total acetaminophen per day). 180 Tablet 6    sucralfate (CARAFATE) 1 GM/10ML Suspension        No current facility-administered medications on file prior to visit.         Allergies:   Allergies   Allergen Reactions    Latex Rash     DJQ=2940    Other Drug Rash     Allergic to Estrogen IM - preservative within medication  INB=2124    Sulfa Drugs Rash and Itching     OEE=9295    Sulfadiazine      Reaction: ITCHING Create Date: [id number] Create User Name: Lindsay Alvarez Update Date: [id number] Update User Name: Lindsay Alvarez       Social Hx:   Social History     Socioeconomic History    Marital status:      Spouse name: Not on file    Number of children: Not on file    Years of education: Not on file    Highest education level: Not on file   Occupational History    Not on file   Tobacco Use    Smoking status: Never    Smokeless tobacco: Never   Vaping Use    Vaping Use: Never used   Substance and Sexual Activity    Alcohol use: Yes      "Alcohol/week: 0.0 oz     Comment: 3-4 month    Drug use: No    Sexual activity: Not on file   Other Topics Concern    Not on file   Social History Narrative    Not on file     Social Determinants of Health     Financial Resource Strain: Not on file   Food Insecurity: Not on file   Transportation Needs: Not on file   Physical Activity: Not on file   Stress: Not on file   Social Connections: Not on file   Intimate Partner Violence: Not on file   Housing Stability: Not on file         EXAMINATION     Physical Exam:   Vitals: /78 (BP Location: Right arm, Patient Position: Sitting, BP Cuff Size: Adult)   Pulse 86   Temp 36.6 °C (97.9 °F) (Temporal)   Ht 1.676 m (5' 6\")   Wt 116 kg (255 lb 11.7 oz)   SpO2 90%     Constitutional:   Body Habitus: Body mass index is 41.28 kg/m².  Cooperation: Fully cooperates with exam  Appearance: Well-groomed no disheveled    Respiratory-  breathing comfortable on room air, no audible wheezing  Cardiovascular- capillary refills less than 2 seconds. No lower extremity edema is noted.   Psychiatric- alert and oriented ×3. Normal affect.    MSK and Neuro: -      Thoracic/Lumbar Spine/Sacral Spine/Hips   There are no signs of infection around the injection sites.   decreased active range of motion with flexion, lateral flexion, and rotation bilaterally.   There is decreased active range of motion with lumbar extension.    There is  pain with lumbar extension.   There is pain with facet loading maneuver (extension rotation) with axial low back pain on the BILATERAL side(s)    Palpation:   No tenderness to palpation in midline at T1-T12 levels. No tenderness to palpation in the left and right of the midline T1-L5, NEGATIVE for tenderness to palpation to the para-midline region in the lower lumbar levels.  palpation over SI joint: negative bilaterally    palpation in hip or over the gluteus medius tendon insertion: negative bilaterally      Lumbar spine Special tests  Neuro " tension  Straight leg test negative bilaterally    Slump test negative bilaterally      Key points for the international standards for neurological classification of spinal cord injury (ISNCSCI) to light touch.     Dermatome R L                                      L2 2 2   L3 2 2   L4 2 2   L5 2 2   S1 2 2   S2 2 2         Motor Exam Lower Extremities    ? Myotome R L   Hip flexion L2 5 5   Knee extension L3 5 5   Ankle dorsiflexion L4 5 5   Toe extension L5 5 5   Ankle plantarflexion S1 5 5           MEDICAL DECISION MAKING    DATA    Labs:   Lab Results   Component Value Date/Time    SODIUM 142 05/11/2021 05:10 PM    POTASSIUM 4.1 05/11/2021 05:10 PM    CHLORIDE 109 05/11/2021 05:10 PM    CO2 24 05/11/2021 05:10 PM    GLUCOSE 109 (H) 05/11/2021 05:10 PM    BUN 25 (H) 05/11/2021 05:10 PM    CREATININE 0.97 05/11/2021 05:10 PM        Lab Results   Component Value Date/Time    PROTHROMBTM 12.8 05/18/2012 08:57 AM    INR 0.95 05/18/2012 08:57 AM        Lab Results   Component Value Date/Time    WBC 8.7 05/11/2021 05:10 PM    RBC 4.60 05/11/2021 05:10 PM    HEMOGLOBIN 14.0 05/11/2021 05:10 PM    HEMATOCRIT 43.6 05/11/2021 05:10 PM    MCV 94.8 05/11/2021 05:10 PM    MCH 30.4 05/11/2021 05:10 PM    MCHC 32.1 (L) 05/11/2021 05:10 PM    MPV 12.3 05/11/2021 05:10 PM    NEUTSPOLYS 53.00 05/11/2021 05:10 PM    LYMPHOCYTES 33.90 05/11/2021 05:10 PM    MONOCYTES 7.90 05/11/2021 05:10 PM    EOSINOPHILS 4.00 05/11/2021 05:10 PM    BASOPHILS 0.70 05/11/2021 05:10 PM        No results found for: HBA1C       Imaging:   I personally reviewed following images    MRI lumbar spine 4/14/2022  There are no areas of high-grade neuroforaminal or central canal stenosis.  There is facet arthropathy worst on the left at L3-4, L4-5, L5-S1.  Degenerative disc disease at L4-5 and L5-S1.  High intensity zone's at the left paracentric region at L4-5 and L5-S1 which can be consistent with an annular tear.      MRI lumbar spine 7/27/2015  No areas  of high-grade central stenosis.  Tiny disc protrusions at L4-5 and L5-S1.    I reviewed the following radiology reports    MRI left hip 7/27/2015  IMPRESSION:   1.  Gluteal tendinopathy and greater trochanteric bursitis.  2.  Ischia femoral impingement.   3.  3.9 cm left ovarian cyst.           Results for orders placed during the hospital encounter of 04/14/22    MR-LUMBAR SPINE-W/O    Impression  1.  Mild degenerative disc disease at L4-5 and L5-S1 as described. Central annular fissure posterior disc is unchanged at both levels.    2.  Facet arthropathy at L3-L4, left greater than right.                                                        Results for orders placed during the hospital encounter of 04/23/21    CT-CHEST (THORAX) WITH    Impression  1.  No evidence of focal consolidation or pleural effusion.    2.  No evidence of mediastinal or hilar adenopathy.    3.  Fatty liver.    4.  Prior cholecystectomy.            Results for orders placed during the hospital encounter of 05/02/18    CT-ABDOMEN WITH & W/O    Impression  Unremarkable CT scan of the abdomen with attention to the pancreas.    Status post cholecystectomy.       Results for orders placed during the hospital encounter of 07/01/16    CT-ABDOMEN-PELVIS WITH    Impression  Unremarkable contrast-enhanced CT of the abdomen and pelvis though appendix not visualized.                   Results for orders placed during the hospital encounter of 10/03/14    DX-ANKLE 3+ VIEWS    Impression  1.  Soft tissue swelling with no acute fracture.  2.  Mild degenerative changes talonavicular joint and possible focal area of osteonecrosis articular surface talus.        Results for orders placed in visit on 11/08/21    DX-ELBOW-LIMITED 2- LEFT             Results for orders placed during the hospital encounter of 04/07/15    DX-HIP-COMPLETE - UNILATERAL 2+    Impression  Unremarkable examination of the left hip.   Results for orders placed during the hospital  encounter of 09    DX-HIPS-COMPLETE - BILATERAL 3+    Impression  IMPRESSION:    NEGATIVE EXAMINATION OF BOTH HIPS.    SST:dxm      Read By MISHA FIGUEROA MD on 2009  4:37PM  : DXM Transcription Date: 2009  8:27PM  THIS DOCUMENT HAS BEEN ELECTRONICALLY SIGNED BY: MISHA FIGUEROA MD on  2009  2:34PM        Results for orders placed during the hospital encounter of 22    DX-LUMBAR SPINE-2 OR 3 VIEWS    Impression  1.  Minimal retrolisthesis of L1 on L2.  2.  Mild degenerative changes at L5/S1.  3.  Mild facet arthropathy in the lower lumbar spine.              Results for orders placed during the hospital encounter of 09    DX-THORACIC SPINE-WITH SWIMMERS VIEW    Impression  IMPRESSION:    1. MINIMAL ANTERIOR ENDPLATE OSTEOPHYTOSIS INFERIORLY.  OTHERWISE  NEGATIVE EXAMINATION OF THE THORACIC SPINE.    SST:dxm       Results for orders placed during the hospital encounter of 04/20/15    DX-WRIST-LIMITED 2-    Impression  No acute osseous abnormality is identified.        DIAGNOSIS   Visit Diagnoses     ICD-10-CM   1. Lumbar spondylosis  M47.816   2. Lumbar radiculopathy  M54.16   3. Chronic hip pain  M25.559    G89.29   4. Impaired mobility and ADLs  Z74.09    Z78.9   5. Lumbar degenerative disc disease  M51.36         ASSESSMENT and PLAN:     Balbina Brito Prakash  1975 female      Balbina was seen today for follow-up.    Diagnoses and all orders for this visit:    Lumbar spondylosis  Comments:  Not controlled, see below  Orders:  -     Referral to Physical Medicine Rehab  -     Referral to Physical Medicine Rehab  -     tizanidine (ZANAFLEX) 4 MG Tab; Take 1 Tablet by mouth at bedtime as needed (pain).  -     gabapentin (NEURONTIN) 300 MG Cap; Take 1-2 Capsules by mouth 3 times a day as needed (pain).    Lumbar radiculopathy  Comments:  Stable, improved after epidural steroid injection  Orders:  -     tizanidine (ZANAFLEX) 4 MG Tab; Take 1 Tablet by  mouth at bedtime as needed (pain).  -     gabapentin (NEURONTIN) 300 MG Cap; Take 1-2 Capsules by mouth 3 times a day as needed (pain).    Chronic hip pain  -     tizanidine (ZANAFLEX) 4 MG Tab; Take 1 Tablet by mouth at bedtime as needed (pain).  -     gabapentin (NEURONTIN) 300 MG Cap; Take 1-2 Capsules by mouth 3 times a day as needed (pain).    Impaired mobility and ADLs  -     tizanidine (ZANAFLEX) 4 MG Tab; Take 1 Tablet by mouth at bedtime as needed (pain).  -     gabapentin (NEURONTIN) 300 MG Cap; Take 1-2 Capsules by mouth 3 times a day as needed (pain).    Lumbar degenerative disc disease  Comments:  Stable, continue home exercise program     The patient is failed conservative treatments with medication management, home exercise program from the direction of physical therapy/physician including the past 6 months .  I have ordered a BILATERAL diagnostic medial branch block targeting the L3-4 and L4-5 facet joints #1 and #2.  Procedure #2 is only to be done if #1 is a positive block.    The risks benefits and alternatives to this procedure were discussed and the patient wishes to proceed with the procedure. Risks include but are not limited to damage to surrounding structures, infection, bleeding, worsening of pain which can be permanent, weakness which can be permanent. Benefits include pain relief, improved function. Alternatives includes not doing the procedure.   If there are 2 positive blocks I would consider the patient for radiofrequency neurotomy of the previously blocked nerves.      Follow up: After the above diagnostic procedures    Thank you for allowing me to participate in the care of this patient. If you have any questions please not hesitate to contact me.             Please note that this dictation was created using voice recognition software. I have made every reasonable attempt to correct obvious errors but there may be errors of grammar and content that I may have overlooked prior to  finalization of this note.      Davide Bartholomew MD  Interventional Spine and Sports Physiatry  Physical Medicine and Rehabilitation  [facility]

## 2022-08-29 NOTE — H&P (VIEW-ONLY)
Follow up patient note  Interventional spine and Pain  Physiatry (physical medicine and  Rehabilitation)       Chief complaint:   Chief Complaint   Patient presents with    Follow-Up     Back pain          HISTORY    Please see new patient note by Dr Bartholomew,  for more details.     HPI  Patient identification: Balbina Randall ,  1975,   With The primary encounter diagnosis was Lumbar spondylosis. Diagnoses of Lumbar radiculopathy, Chronic hip pain, Impaired mobility and ADLs, and Lumbar degenerative disc disease were also pertinent to this visit.       Procedure:  2022 left L3-4 and L4-5 transforaminal epidural steroid injection.  60% improved postprocedure    The patient continues to have pain relief after the epidural steroid injection.  She is not having radicular component to her pain.  Majority patient's pain is bilateral axial aching low back pain moderate in intensity nonradiating worse with lumbar extension worse with bending and lifting causing functional deficits.    Pain is been for the past greater than 6 months.  The patient is done a comprehensive pain program including the past 4 months.      Medications tried include toradol, zanaflex, percocet, oral steroids, mobic.          ROS Red Flags :   Fever, Chills, Sweats: Denies  Involuntary Weight Loss: Denies  Bowel/Bladder Incontinence: Denies  Saddle Anesthesia: Denies        PMHx:   Past Medical History:   Diagnosis Date    Airway problem 1/19/15    abnormal airway assessment as noted on procedure orders per MD (Dr. Serrato)    Heart burn     Indigestion     Other specified symptom associated with female genital organs     ovarian cyst, adhesions    Harleen-Parkinson-White syndrome     WPW (Harleen-Parkinson-White syndrome) since birth    cardiologist, Dr. So       PSHx:   Past Surgical History:   Procedure Laterality Date    LUMBAR TRANSFORAMINAL EPIDURAL STEROID INJECTION Left 2022    Procedure: LEFT L3-4 and L4-5  "transforaminal epidural steroid injection with fluoroscopic guidance;  Surgeon: Davide Bartholomew M.D.;  [facility];  Service: Pain Management    BLOCK EPIDURAL STEROID INJECTION Left 4/19/2022    Procedure: .;  Surgeon: Davide Bartholomew M.D.;  Location: [facility];  Service: Pain Management    VT SHLDR ARTHROSCOP,PART ACROMIOPLAS Right 1/29/2020    Procedure: DECOMPRESSION, SHOULDER, ARTHROSCOPIC - SUBACROMIAL, EXTENSIVE DEBRIDEMENT;  Surgeon: Robert Park M.D.;  Location: [facility];  Service: Orthopedics    CLAVICLE DISTAL EXCISION Right 1/29/2020    Procedure: EXCISION, CLAVICLE, DISTAL;  Surgeon: Robert Park M.D.;  Location: [facility];  Service: Orthopedics    ROTATOR CUFF REPAIR Right 1/29/2020    Procedure: REPAIR, ROTATOR CUFF;  Surgeon: Robert Park M.D.;  Location: [facility];  Service: Orthopedics    HYSTEROSCOPY NOVASURE-2  4/27/2017    Procedure: HYSTEROSCOPY NOVASURE;  Surgeon: Daxa Webb M.D.;  Location: [facility]ERY [facility];  Service:     WRIST ARTHROSCOPY Right 1/13/2016    Procedure: WRIST ARTHROSCOPY with debridment  ;  Surgeon: Jose Daniel Redmond M.D.;  Location: [facility];  Service:     LIGAMENT REPAIR  1/13/2016    Procedure: LIGAMENT REPAIR;  Surgeon: Jose Daniel Redmond M.D.;  Location: [facility];  Service:     COLONOSCOPY WITH BIOPSY  1/26/2015    Performed by Mauricio Serrato M.D. at [facility]    GASTROSCOPY WITH BIOPSY  1/26/2015    Performed by Mauricio Serrato M.D. at [facility]    HYSTEROSCOPY WITH VIDEO DIAGNOSTIC  5/24/2013    Performed by Robert Rosario M.D. at [facility]    KIRT BY LAPAROSCOPY  9/2/2010    Performed by KIM ZHAO at [facility]    PRIMARY C SECTION  4/30/2010    Performed by DAXA WEBB at [facility]    BOWEL RESECTION  1992    \"adhesions\"    " APPENDECTOMY  1992    LAPAROSCOPY  1992, 2002    OTHER      kenalog inj left foot       Family history   Family History   Problem Relation Age of Onset    Hypertension Other     Cancer Other          Medications:   Outpatient Medications Marked as Taking for the 8/29/22 encounter (Office Visit) with Davide Bartholomew M.D.   Medication Sig Dispense Refill    tizanidine (ZANAFLEX) 4 MG Tab Take 1 Tablet by mouth at bedtime as needed (pain). 30 Tablet 5    gabapentin (NEURONTIN) 300 MG Cap Take 1-2 Capsules by mouth 3 times a day as needed (pain). 180 Capsule 5    pantoprazole (PROTONIX) 40 MG Tablet Delayed Response           Current Outpatient Medications on File Prior to Visit   Medication Sig Dispense Refill    pantoprazole (PROTONIX) 40 MG Tablet Delayed Response       estradiol (VAGIFEM) 10 MCG Tab insert one tablet vaginally two times a week      acetaminophen (TYLENOL) 500 MG Tab Take 2 Tablets by mouth 3 times a day as needed (pain.  Do not exceed 3000 mg of total acetaminophen per day). 180 Tablet 6    sucralfate (CARAFATE) 1 GM/10ML Suspension        No current facility-administered medications on file prior to visit.         Allergies:   Allergies   Allergen Reactions    Latex Rash     NDC=5351    Other Drug Rash     Allergic to Estrogen IM - preservative within medication  IVL=2606    Sulfa Drugs Rash and Itching     PXK=3401    Sulfadiazine      Reaction: ITCHING Create Date: [id number] Create User Name: Lindsay Alvarez Update Date: [id number] Update User Name: Lindsay Alvarez       Social Hx:   Social History     Socioeconomic History    Marital status:      Spouse name: Not on file    Number of children: Not on file    Years of education: Not on file    Highest education level: Not on file   Occupational History    Not on file   Tobacco Use    Smoking status: Never    Smokeless tobacco: Never   Vaping Use    Vaping Use: Never used   Substance and Sexual Activity    Alcohol use: Yes      "Alcohol/week: 0.0 oz     Comment: 3-4 month    Drug use: No    Sexual activity: Not on file   Other Topics Concern    Not on file   Social History Narrative    Not on file     Social Determinants of Health     Financial Resource Strain: Not on file   Food Insecurity: Not on file   Transportation Needs: Not on file   Physical Activity: Not on file   Stress: Not on file   Social Connections: Not on file   Intimate Partner Violence: Not on file   Housing Stability: Not on file         EXAMINATION     Physical Exam:   Vitals: /78 (BP Location: Right arm, Patient Position: Sitting, BP Cuff Size: Adult)   Pulse 86   Temp 36.6 °C (97.9 °F) (Temporal)   Ht 1.676 m (5' 6\")   Wt 116 kg (255 lb 11.7 oz)   SpO2 90%     Constitutional:   Body Habitus: Body mass index is 41.28 kg/m².  Cooperation: Fully cooperates with exam  Appearance: Well-groomed no disheveled    Respiratory-  breathing comfortable on room air, no audible wheezing  Cardiovascular- capillary refills less than 2 seconds. No lower extremity edema is noted.   Psychiatric- alert and oriented ×3. Normal affect.    MSK and Neuro: -      Thoracic/Lumbar Spine/Sacral Spine/Hips   There are no signs of infection around the injection sites.   decreased active range of motion with flexion, lateral flexion, and rotation bilaterally.   There is decreased active range of motion with lumbar extension.    There is  pain with lumbar extension.   There is pain with facet loading maneuver (extension rotation) with axial low back pain on the BILATERAL side(s)    Palpation:   No tenderness to palpation in midline at T1-T12 levels. No tenderness to palpation in the left and right of the midline T1-L5, NEGATIVE for tenderness to palpation to the para-midline region in the lower lumbar levels.  palpation over SI joint: negative bilaterally    palpation in hip or over the gluteus medius tendon insertion: negative bilaterally      Lumbar spine Special tests  Neuro " tension  Straight leg test negative bilaterally    Slump test negative bilaterally      Key points for the international standards for neurological classification of spinal cord injury (ISNCSCI) to light touch.     Dermatome R L                                      L2 2 2   L3 2 2   L4 2 2   L5 2 2   S1 2 2   S2 2 2         Motor Exam Lower Extremities    ? Myotome R L   Hip flexion L2 5 5   Knee extension L3 5 5   Ankle dorsiflexion L4 5 5   Toe extension L5 5 5   Ankle plantarflexion S1 5 5           MEDICAL DECISION MAKING    DATA    Labs:   Lab Results   Component Value Date/Time    SODIUM 142 05/11/2021 05:10 PM    POTASSIUM 4.1 05/11/2021 05:10 PM    CHLORIDE 109 05/11/2021 05:10 PM    CO2 24 05/11/2021 05:10 PM    GLUCOSE 109 (H) 05/11/2021 05:10 PM    BUN 25 (H) 05/11/2021 05:10 PM    CREATININE 0.97 05/11/2021 05:10 PM        Lab Results   Component Value Date/Time    PROTHROMBTM 12.8 05/18/2012 08:57 AM    INR 0.95 05/18/2012 08:57 AM        Lab Results   Component Value Date/Time    WBC 8.7 05/11/2021 05:10 PM    RBC 4.60 05/11/2021 05:10 PM    HEMOGLOBIN 14.0 05/11/2021 05:10 PM    HEMATOCRIT 43.6 05/11/2021 05:10 PM    MCV 94.8 05/11/2021 05:10 PM    MCH 30.4 05/11/2021 05:10 PM    MCHC 32.1 (L) 05/11/2021 05:10 PM    MPV 12.3 05/11/2021 05:10 PM    NEUTSPOLYS 53.00 05/11/2021 05:10 PM    LYMPHOCYTES 33.90 05/11/2021 05:10 PM    MONOCYTES 7.90 05/11/2021 05:10 PM    EOSINOPHILS 4.00 05/11/2021 05:10 PM    BASOPHILS 0.70 05/11/2021 05:10 PM        No results found for: HBA1C       Imaging:   I personally reviewed following images    MRI lumbar spine 4/14/2022  There are no areas of high-grade neuroforaminal or central canal stenosis.  There is facet arthropathy worst on the left at L3-4, L4-5, L5-S1.  Degenerative disc disease at L4-5 and L5-S1.  High intensity zone's at the left paracentric region at L4-5 and L5-S1 which can be consistent with an annular tear.      MRI lumbar spine 7/27/2015  No areas  of high-grade central stenosis.  Tiny disc protrusions at L4-5 and L5-S1.    I reviewed the following radiology reports    MRI left hip 7/27/2015  IMPRESSION:   1.  Gluteal tendinopathy and greater trochanteric bursitis.  2.  Ischia femoral impingement.   3.  3.9 cm left ovarian cyst.           Results for orders placed during the hospital encounter of 04/14/22    MR-LUMBAR SPINE-W/O    Impression  1.  Mild degenerative disc disease at L4-5 and L5-S1 as described. Central annular fissure posterior disc is unchanged at both levels.    2.  Facet arthropathy at L3-L4, left greater than right.                                                        Results for orders placed during the hospital encounter of 04/23/21    CT-CHEST (THORAX) WITH    Impression  1.  No evidence of focal consolidation or pleural effusion.    2.  No evidence of mediastinal or hilar adenopathy.    3.  Fatty liver.    4.  Prior cholecystectomy.            Results for orders placed during the hospital encounter of 05/02/18    CT-ABDOMEN WITH & W/O    Impression  Unremarkable CT scan of the abdomen with attention to the pancreas.    Status post cholecystectomy.       Results for orders placed during the hospital encounter of 07/01/16    CT-ABDOMEN-PELVIS WITH    Impression  Unremarkable contrast-enhanced CT of the abdomen and pelvis though appendix not visualized.                   Results for orders placed during the hospital encounter of 10/03/14    DX-ANKLE 3+ VIEWS    Impression  1.  Soft tissue swelling with no acute fracture.  2.  Mild degenerative changes talonavicular joint and possible focal area of osteonecrosis articular surface talus.        Results for orders placed in visit on 11/08/21    DX-ELBOW-LIMITED 2- LEFT             Results for orders placed during the hospital encounter of 04/07/15    DX-HIP-COMPLETE - UNILATERAL 2+    Impression  Unremarkable examination of the left hip.   Results for orders placed during the hospital  encounter of 09    DX-HIPS-COMPLETE - BILATERAL 3+    Impression  IMPRESSION:    NEGATIVE EXAMINATION OF BOTH HIPS.    SST:dxm      Read By MISHA FIGUEROA MD on 2009  4:37PM  : DXM Transcription Date: 2009  8:27PM  THIS DOCUMENT HAS BEEN ELECTRONICALLY SIGNED BY: MISHA FIGUEROA MD on  2009  2:34PM        Results for orders placed during the hospital encounter of 22    DX-LUMBAR SPINE-2 OR 3 VIEWS    Impression  1.  Minimal retrolisthesis of L1 on L2.  2.  Mild degenerative changes at L5/S1.  3.  Mild facet arthropathy in the lower lumbar spine.              Results for orders placed during the hospital encounter of 09    DX-THORACIC SPINE-WITH SWIMMERS VIEW    Impression  IMPRESSION:    1. MINIMAL ANTERIOR ENDPLATE OSTEOPHYTOSIS INFERIORLY.  OTHERWISE  NEGATIVE EXAMINATION OF THE THORACIC SPINE.    SST:dxm       Results for orders placed during the hospital encounter of 04/20/15    DX-WRIST-LIMITED 2-    Impression  No acute osseous abnormality is identified.        DIAGNOSIS   Visit Diagnoses     ICD-10-CM   1. Lumbar spondylosis  M47.816   2. Lumbar radiculopathy  M54.16   3. Chronic hip pain  M25.559    G89.29   4. Impaired mobility and ADLs  Z74.09    Z78.9   5. Lumbar degenerative disc disease  M51.36         ASSESSMENT and PLAN:     Balbina Brito Prakash  1975 female      Balbina was seen today for follow-up.    Diagnoses and all orders for this visit:    Lumbar spondylosis  Comments:  Not controlled, see below  Orders:  -     Referral to Physical Medicine Rehab  -     Referral to Physical Medicine Rehab  -     tizanidine (ZANAFLEX) 4 MG Tab; Take 1 Tablet by mouth at bedtime as needed (pain).  -     gabapentin (NEURONTIN) 300 MG Cap; Take 1-2 Capsules by mouth 3 times a day as needed (pain).    Lumbar radiculopathy  Comments:  Stable, improved after epidural steroid injection  Orders:  -     tizanidine (ZANAFLEX) 4 MG Tab; Take 1 Tablet by  mouth at bedtime as needed (pain).  -     gabapentin (NEURONTIN) 300 MG Cap; Take 1-2 Capsules by mouth 3 times a day as needed (pain).    Chronic hip pain  -     tizanidine (ZANAFLEX) 4 MG Tab; Take 1 Tablet by mouth at bedtime as needed (pain).  -     gabapentin (NEURONTIN) 300 MG Cap; Take 1-2 Capsules by mouth 3 times a day as needed (pain).    Impaired mobility and ADLs  -     tizanidine (ZANAFLEX) 4 MG Tab; Take 1 Tablet by mouth at bedtime as needed (pain).  -     gabapentin (NEURONTIN) 300 MG Cap; Take 1-2 Capsules by mouth 3 times a day as needed (pain).    Lumbar degenerative disc disease  Comments:  Stable, continue home exercise program     The patient is failed conservative treatments with medication management, home exercise program from the direction of physical therapy/physician including the past 6 months .  I have ordered a BILATERAL diagnostic medial branch block targeting the L3-4 and L4-5 facet joints #1 and #2.  Procedure #2 is only to be done if #1 is a positive block.    The risks benefits and alternatives to this procedure were discussed and the patient wishes to proceed with the procedure. Risks include but are not limited to damage to surrounding structures, infection, bleeding, worsening of pain which can be permanent, weakness which can be permanent. Benefits include pain relief, improved function. Alternatives includes not doing the procedure.   If there are 2 positive blocks I would consider the patient for radiofrequency neurotomy of the previously blocked nerves.      Follow up: After the above diagnostic procedures    Thank you for allowing me to participate in the care of this patient. If you have any questions please not hesitate to contact me.             Please note that this dictation was created using voice recognition software. I have made every reasonable attempt to correct obvious errors but there may be errors of grammar and content that I may have overlooked prior to  finalization of this note.      Davide Bartholomew MD  Interventional Spine and Sports Physiatry  Physical Medicine and Rehabilitation  [facility]

## 2022-08-31 ENCOUNTER — APPOINTMENT (OUTPATIENT)
Dept: RADIOLOGY | Facility: REHABILITATION | Age: 47
End: 2022-08-31
Attending: PHYSICAL MEDICINE & REHABILITATION
Payer: COMMERCIAL

## 2022-08-31 ENCOUNTER — HOSPITAL ENCOUNTER (OUTPATIENT)
Facility: REHABILITATION | Age: 47
End: 2022-08-31
Attending: PHYSICAL MEDICINE & REHABILITATION | Admitting: PHYSICAL MEDICINE & REHABILITATION
Payer: COMMERCIAL

## 2022-08-31 VITALS
SYSTOLIC BLOOD PRESSURE: 129 MMHG | BODY MASS INDEX: 40.75 KG/M2 | OXYGEN SATURATION: 99 % | HEART RATE: 50 BPM | WEIGHT: 253.53 LBS | DIASTOLIC BLOOD PRESSURE: 82 MMHG | HEIGHT: 66 IN | RESPIRATION RATE: 16 BRPM | TEMPERATURE: 96.7 F

## 2022-08-31 PROCEDURE — 700117 HCHG RX CONTRAST REV CODE 255

## 2022-08-31 PROCEDURE — 700111 HCHG RX REV CODE 636 W/ 250 OVERRIDE (IP)

## 2022-08-31 PROCEDURE — 64493 INJ PARAVERT F JNT L/S 1 LEV: CPT

## 2022-08-31 PROCEDURE — 700101 HCHG RX REV CODE 250

## 2022-08-31 PROCEDURE — 64494 INJ PARAVERT F JNT L/S 2 LEV: CPT

## 2022-08-31 RX ORDER — LIDOCAINE HYDROCHLORIDE 20 MG/ML
INJECTION, SOLUTION EPIDURAL; INFILTRATION; INTRACAUDAL; PERINEURAL
Status: COMPLETED
Start: 2022-08-31 | End: 2022-08-31

## 2022-08-31 RX ORDER — LIDOCAINE HYDROCHLORIDE 10 MG/ML
INJECTION, SOLUTION EPIDURAL; INFILTRATION; INTRACAUDAL; PERINEURAL
Status: COMPLETED
Start: 2022-08-31 | End: 2022-08-31

## 2022-08-31 RX ADMIN — IOHEXOL 5 ML: 240 INJECTION, SOLUTION INTRATHECAL; INTRAVASCULAR; INTRAVENOUS; ORAL at 08:16

## 2022-08-31 RX ADMIN — LIDOCAINE HYDROCHLORIDE 10 ML: 10 INJECTION, SOLUTION EPIDURAL; INFILTRATION; INTRACAUDAL; PERINEURAL at 08:14

## 2022-08-31 RX ADMIN — LIDOCAINE HYDROCHLORIDE 10 ML: 20 INJECTION, SOLUTION EPIDURAL; INFILTRATION; INTRACAUDAL at 08:17

## 2022-08-31 ASSESSMENT — PAIN DESCRIPTION - PAIN TYPE
TYPE: CHRONIC PAIN
TYPE: CHRONIC PAIN

## 2022-08-31 ASSESSMENT — FIBROSIS 4 INDEX: FIB4 SCORE: 1.03

## 2022-08-31 NOTE — PROGRESS NOTES
0804: Hand-off rec'd from pre-procedure RN, pre-assessment completed, pt placed onto procedure table and positioned prone and hands resting on stool, blood pressure and pulse ox connected by CNA and RN, medical history reviewed (WPW), allergies reviewed (Latex, sulfa, estrogen IM), no sedation given, not taking blood thinners, patient identified, time out performed and team agreed.

## 2022-08-31 NOTE — INTERVAL H&P NOTE
H&P reviewed. The patient was examined and there are no changes to the H&P     Lungs clear to auscultation bilaterally.  No abdominal tenderness.  Heart regular rate and rhythm.  Vitals reviewed.    Proceed with the originally scheduled procedure.  The risks, benefits and alternatives were discussed.  The patient understands these.    Pre-Op Diagnosis Codes:     * Lumbar spondylosis [M47.816]  Procedure(s):  Diagnostic media branch blocks targeting the BILATERAL L3-4 and L4-5 facet joints with fluoroscopic guidance    Davide Bartholomew MD  Physical Medicine and Rehabilitation  Interventional Spine and Sports Physiatry  [facility]

## 2022-08-31 NOTE — PROGRESS NOTES
0720 Pt received to pre procedure area. ID band and allergies verified. Vital signs taken and stable. Pertinent medical history (Harleen-Parkinson-White Syndrome) reviewed and communicated to procedure RN. Verified that patient has not taken NSAIDS, anticoagulants or blood thinners in past 5 days. Reviewed post op instructions with patient, questions answered, verbalized understanding.      0825  Pt received to recovery area, report received from procedure RN Laney . Vitals taken and stable. Patient tolerated po fluids and snack without difficulty. Dressing clean, dry and intact. Ice pack applied over dressing.            Pt seen by Dr. Bartholomew post procedure, orders received for discharge. Patient ambulatory without difficulty. Pt discharged to designated .

## 2022-08-31 NOTE — OP REPORT
Date of Service: 8/31/2022     Patient: Balbina Randall 47 y.o. female     MRN: [id number]     Physician/s: Davide Bartholomew MD    Pre-operative Diagnosis: Lumbosacral spondylosis, facet arthropathy. The patient was NOT seen for lumbar radiculopathy today.     Post-operative Diagnosis: Lumbosacral spondylosis, facet arthropathy. The patient was NOT seen for lumbar radiculopathy today.     Procedure: Medial Branch Blocks targeting the bilateral  L3-4 and L4-5  facet joints     Description of procedure:    The risks, benefits, and alternatives of the procedure were reviewed and discussed with the patient.  Written informed consent was freely obtained. A pre-procedural time-out was conducted by the physician verifying patient’s identity, procedure to be performed, procedure site and side, and allergy verification. Appropriate equipment was determined to be in place for the procedure.         In the fluoroscopy suite the patient was placed in a prone position and the skin was prepped and draped in the usual sterile fashion. The fluoroscope was placed over the lower back at the appropriate angles, and the targets for injection were marked. A 27g needle was placed into each of the markings at the levels below, and approx 1cc of 1% Lidocaine was injected subcutaneously into the epidermal and dermal layers. The needle was removed intact.     Using an oblique view, A 22g 5 inch needle was then placed at the intersection of the transverse process and superior articular process at the L3-4 facet joint where the L2 medial branch runs on the right side. The needle tips were then verified by AP, oblique, and lateral views.     Using an oblique view, A 22g 5 inch needle was then placed at the intersection of the transverse process and superior articular process at the L4-5 facet joint where the L3 medial branch runs  on the right side. The needle tips were then verified by AP, oblique, and lateral views.     Using an oblique  view, A 22g 5 inch needle was then placed at the intersection of the transverse process and superior articular process at the L5-S1 facet joint where the L4 medial branch runs  on the right side. The needle tips were then verified by AP, oblique, and lateral views.     Using an oblique view, A 22g 5 inch needle was then placed at the intersection of the transverse process and superior articular process at the L3-4 facet joint where the L2 medial branch runs  on the left side. The needle tips were then verified by AP, oblique, and lateral views.       Using an oblique view, A 22g 5 inch needle was then placed at the intersection of the transverse process and superior articular process at the L4-5 facet joint where the L3 medial branch runs  on the left side. The needle tips were then verified by AP, oblique, and lateral views.     Using an oblique view, A 22g 5 inch needle was then placed at the intersection of the transverse process and superior articular process at the L5-S1 facet joint where the L4 medial branch runs  on the left side. The needle tips were then verified by AP, oblique, and lateral views.        In the AP view, less than 1 cc of contrast dye was used to highlight the medial branch while the fluoroscope was running live at the levels above. Following negative aspiration, approximately 1mL of 2% lidocaine  was then injected at the above levels, and the needles were removed intact after restyleted. The patient's back was covered with a 4x4 gauze, the area was cleansed with sterile normal saline, and a dressing was applied.     There were no complications noted, the patient was hemodynamically stable, and tolerated the procedure well.     The patient had 100% pain relief of the index pain minutes post procedure.    Preprocedure pain 5/10  Postprocedure pain 0/10    Follow-up as scheduled      Davide Bartholomew MD  Physical Medicine and Rehabilitation  Interventional Spine and Sports Physiatry  [facility]            CPT  Intraarticular joint or medial branch block (MBB) - lumbar or sacral (1st level):  32302-92-tg  Intraarticular joint or medial branch block (MBB) - lumbar or sacral (2nd level):  77206-99-uj

## 2022-09-01 ENCOUNTER — TELEPHONE (OUTPATIENT)
Dept: PHYSICAL MEDICINE AND REHAB | Facility: MEDICAL CENTER | Age: 47
End: 2022-09-01
Payer: COMMERCIAL

## 2022-09-01 NOTE — TELEPHONE ENCOUNTER
Called for post-sp check-up. Pt reported the following regarding the procedure site:    Change in pain?: Yes    Concerns?: No    Confirmed [facility] appt?: Yes

## 2022-09-07 ENCOUNTER — APPOINTMENT (OUTPATIENT)
Dept: RADIOLOGY | Facility: REHABILITATION | Age: 47
End: 2022-09-07
Attending: PHYSICAL MEDICINE & REHABILITATION
Payer: COMMERCIAL

## 2022-09-07 ENCOUNTER — HOSPITAL ENCOUNTER (OUTPATIENT)
Facility: REHABILITATION | Age: 47
End: 2022-09-07
Attending: PHYSICAL MEDICINE & REHABILITATION | Admitting: PHYSICAL MEDICINE & REHABILITATION
Payer: COMMERCIAL

## 2022-09-07 VITALS
BODY MASS INDEX: 41.24 KG/M2 | DIASTOLIC BLOOD PRESSURE: 78 MMHG | HEART RATE: 53 BPM | SYSTOLIC BLOOD PRESSURE: 138 MMHG | WEIGHT: 256.62 LBS | TEMPERATURE: 98.1 F | OXYGEN SATURATION: 96 % | RESPIRATION RATE: 16 BRPM | HEIGHT: 66 IN

## 2022-09-07 PROCEDURE — 64493 INJ PARAVERT F JNT L/S 1 LEV: CPT

## 2022-09-07 PROCEDURE — 64494 INJ PARAVERT F JNT L/S 2 LEV: CPT

## 2022-09-07 PROCEDURE — 700111 HCHG RX REV CODE 636 W/ 250 OVERRIDE (IP)

## 2022-09-07 PROCEDURE — 700101 HCHG RX REV CODE 250

## 2022-09-07 PROCEDURE — 700117 HCHG RX CONTRAST REV CODE 255

## 2022-09-07 RX ORDER — LIDOCAINE HYDROCHLORIDE 20 MG/ML
INJECTION, SOLUTION EPIDURAL; INFILTRATION; INTRACAUDAL; PERINEURAL
Status: COMPLETED
Start: 2022-09-07 | End: 2022-09-07

## 2022-09-07 RX ORDER — LIDOCAINE HYDROCHLORIDE 10 MG/ML
INJECTION, SOLUTION EPIDURAL; INFILTRATION; INTRACAUDAL; PERINEURAL
Status: COMPLETED
Start: 2022-09-07 | End: 2022-09-07

## 2022-09-07 RX ADMIN — LIDOCAINE HYDROCHLORIDE 10 ML: 20 INJECTION, SOLUTION EPIDURAL; INFILTRATION; INTRACAUDAL at 08:55

## 2022-09-07 RX ADMIN — LIDOCAINE HYDROCHLORIDE 10 ML: 10 INJECTION, SOLUTION EPIDURAL; INFILTRATION; INTRACAUDAL; PERINEURAL at 08:51

## 2022-09-07 RX ADMIN — IOHEXOL 5 ML: 240 INJECTION, SOLUTION INTRATHECAL; INTRAVASCULAR; INTRAVENOUS; ORAL at 08:52

## 2022-09-07 ASSESSMENT — FIBROSIS 4 INDEX: FIB4 SCORE: 1.03

## 2022-09-07 ASSESSMENT — PAIN DESCRIPTION - PAIN TYPE: TYPE: CHRONIC PAIN

## 2022-09-07 NOTE — INTERVAL H&P NOTE
H&P reviewed. The patient had 100% pain relief during the diagnostic phase of MBB#1. The patient was examined and there are no changes to the H&P     Lungs clear to auscultation bilaterally.  No abdominal tenderness.  Heart regular rate and rhythm.  Vitals reviewed.    Proceed with the originally scheduled procedure.  The risks, benefits and alternatives were discussed.  The patient understands these.    Pre-Op Diagnosis Codes:     * Lumbar spondylosis [M47.816]  Procedure(s):  Diagnostic medial branch blocks targeting the BILATERAL L3-4 and L4-5 facet joints with fluoroscopic guidance #2    Davide Bartholomew MD  Physical Medicine and Rehabilitation  Interventional Spine and Sports Physiatry  [facility]

## 2022-09-07 NOTE — PROGRESS NOTES
0750 Pt received to pre procedure area. ID band and allergies verified. Vital signs taken and stable. Pertinent medical history (Harleen-Parkinson-White Syndrome) reviewed and communicated to procedure RN. Verified that patient has not taken NSAIDS, anticoagulants or blood thinners in past 5 days. Reviewed post op instructions with patient, questions answered, verbalized understanding.      0902  Pt received to recovery area, report received from procedure RN Laney . Vitals taken and stable. Patient tolerated po fluids and snack without difficulty. Dressing clean, dry and intact. Ice pack applied over dressing.      0920  Pt seen by Dr. Bartholomew post procedure, orders received for discharge. Patient ambulatory without difficulty. Pt discharged to designated .    
0845: Hand-off rec'd from pre-procedure RN, pre-assessment completed, pt placed onto procedure table and positioned prone and hands resting on stool, blood pressure and pulse ox connected by CNA and RN, medical history reviewed (GERD, WPW), allergies reviewed (sulfa, latex, estrogen IM), no sedation given, not taking blood thinners, patient identified, time out performed and team agreed.   
POST OP CHECK    JU FERGUSON    [id number]    History:  The patient is status post left TCAR. Patient is doing well. The patient's pain is controlled using tylenol. Family is at bedside. Denies nausea, vomiting, chest pain, shortness of breath, abdominal pain or fever. No new complaints. No acute motor or sensory changes are reported.    Vital Signs Last 24 Hrs  T(C): 36.6 (08 Mar 2022 15:37), Max: 37.2 (08 Mar 2022 15:00)  T(F): 97.8 (08 Mar 2022 15:37), Max: 98.9 (08 Mar 2022 15:00)  HR: 74 (08 Mar 2022 15:37) (71 - 90)  BP: 138/76 (08 Mar 2022 15:37) (120/65 - 180/156)  BP(mean): 84 (08 Mar 2022 14:00) (60 - 84)  RR: 14 (08 Mar 2022 15:37) (12 - 19)  SpO2: 93% (08 Mar 2022 15:37) (93% - 100%)  I&O's Summary        03-08    x   |  x   |  x   ----------------------------<  x   5.3   |  x   |  x           MEDICATIONS  (STANDING):  aspirin enteric coated 81 milliGRAM(s) Oral daily  atorvastatin 40 milliGRAM(s) Oral at bedtime  calcium carbonate    500 mG (Tums) Chewable 1 Tablet(s) Chew daily  clindamycin IVPB 900 milliGRAM(s) IV Intermittent every 8 hours  clindamycin IVPB 900 milliGRAM(s) IV Intermittent once  fluconAZOLE   Tablet 400 milliGRAM(s) Oral daily  hydrocortisone 10 milliGRAM(s) Oral daily  magnesium oxide 400 milliGRAM(s) Oral daily  multivitamin 1 Tablet(s) Oral daily  mycophenolate mofetil 250 milliGRAM(s) Oral two times a day  pantoprazole    Tablet 40 milliGRAM(s) Oral before breakfast  sevelamer carbonate 800 milliGRAM(s) Oral three times a day with meals  tacrolimus 0.5 milliGRAM(s) Oral daily  tacrolimus 1 milliGRAM(s) Oral two times a day  tamsulosin 0.4 milliGRAM(s) Oral at bedtime    MEDICATIONS  (PRN):  acetaminophen     Tablet .. 650 milliGRAM(s) Oral every 6 hours PRN Temp greater or equal to 38C (100.4F), Mild Pain (1 - 3)  ondansetron Injectable 4 milliGRAM(s) IV Push every 6 hours PRN Nausea      Physical exam:     No distress.   Alert and oriented  Non labored breathing   Left side of neck with small hematoma, incision intact  No facial droop  Tongue protrusion midline  no gross motor or sensory extremity deficits   Abdomen soft, non tender  right groin access site clean, intact with Dermabond        Primary  Assessment:  s/p left TCAR  • patient doing well, no noted deficits       Plan:   • bedrest till the AM  - HD in the morning  - plan for discharge tomorrow after HD   - NO opiates  - Hold home antihypertensive      
Vital Signs Last 24 Hrs  T(C): 36.8 (09 Mar 2022 04:07), Max: 37.2 (08 Mar 2022 15:00)  T(F): 98.3 (09 Mar 2022 04:07), Max: 98.9 (08 Mar 2022 15:00)  HR: 85 (09 Mar 2022 04:07) (71 - 85)  BP: 105/66 (09 Mar 2022 04:07) (105/66 - 150/64)  BP(mean): 84 (08 Mar 2022 14:00) (60 - 84)  ABP: 130/62 (08 Mar 2022 15:00) (120/60 - 137/64)  ABP(mean): 85 (08 Mar 2022 13:30) (81 - 86)  RR: 18 (09 Mar 2022 04:07) (12 - 19)  SpO2: 95% (09 Mar 2022 04:07) (92% - 100%)    HD today. No symptoms. Hemodynamics stable. Tolerating dialysis and ultrafiltration.  Pre Laboratory values personally reviewed by me.  Dialysis adjusted appropriately based on current values.  Will continue the current medical management.  Next hemodialysis as scheduled.  Discussed with nursing, primary care team. 
Vascular Surgery Progress Note:  Patient POD 1 TCAr, he is feeling good, he is awaiting to have his hemodialysis in order to be discharged. Patient completely asymptomatic.   No acute overnight events. Patient afebrile, VSS. Pain well controlled. Tolerating diet. Denies n/v/f/c/cp/sob.     Diet, Renal Restrictions:   For patients receiving Renal Replacement - No Protein Restr, No Conc K, No Conc Phos, Low Sodium (03-08-22 @ 12:02)      Scheduled Medications:   aspirin enteric coated 81 milliGRAM(s) Oral daily  atorvastatin 40 milliGRAM(s) Oral at bedtime  calcium carbonate    500 mG (Tums) Chewable 1 Tablet(s) Chew daily  clindamycin IVPB 900 milliGRAM(s) IV Intermittent once  clopidogrel Tablet 75 milliGRAM(s) Oral daily  fluconAZOLE   Tablet 400 milliGRAM(s) Oral <User Schedule>  hydrocortisone 10 milliGRAM(s) Oral daily  magnesium oxide 400 milliGRAM(s) Oral daily  multivitamin 1 Tablet(s) Oral daily  mycophenolate mofetil 250 milliGRAM(s) Oral two times a day  pantoprazole    Tablet 40 milliGRAM(s) Oral before breakfast  sevelamer carbonate 800 milliGRAM(s) Oral three times a day with meals  tacrolimus 0.5 milliGRAM(s) Oral daily  tacrolimus 1 milliGRAM(s) Oral two times a day  tamsulosin 0.4 milliGRAM(s) Oral at bedtime  trimethoprim   80 mG/sulfamethoxazole 400 mG 1 Tablet(s) Oral <User Schedule>    PRN Medications:  acetaminophen     Tablet .. 650 milliGRAM(s) Oral every 6 hours PRN Temp greater or equal to 38C (100.4F), Mild Pain (1 - 3)  ondansetron Injectable 4 milliGRAM(s) IV Push every 6 hours PRN Nausea  traMADol 25 milliGRAM(s) Oral every 6 hours PRN Moderate-Severe pain (4-10)      Objective:   T(F): 97.9 (03-09 @ 11:50), Max: 98.9 (03-08 @ 15:00)  HR: 81 (03-09 @ 11:50) (72 - 85)  BP: 136/78 (03-09 @ 11:50) (105/66 - 138/77)  BP(mean): 84 (03-08 @ 14:00) (84 - 84)  ABP: 130/62 (03-08 @ 15:00) (130/62 - 137/64)  RR: 17 (03-09 @ 11:50) (14 - 18)  SpO2: 93% (03-09 @ 11:50) (91% - 98%)      Physical Exam:   GEN: patient resting comfortably in bed, in no acute distress  There is a closed incision in left supraclavicular area   RESP: respirations are unlabored, no accessory muscle use, no conversational dyspnea  CVS: RRR  GI: Abdomen soft, non-tender, non-distended, no rebound tenderness / guarding  extremities no edema  At present the patient is awake, alert and fully oriented. There is no evidence of cognitive or language dysfunction. Cranial nerves: Visual fields are full. . Extraocular movements full. Pupils equal, round, react to light. There is no evidence of nystagmus noted. Fifth nerve function is normal. There is no facial asymmetry noted. Lower cranial nerves are normal.    Manual motor testing reveals good tone and bulk throughout. There is no evidence of pronator drift or decreased fine finger movements. Muscle strength is 5/5 throughout. Sensory examination is intact to all modalities including .         I&O's      LABS:                        9.4    9.61  )-----------( 157      ( 09 Mar 2022 04:19 )             32.3     03-09    144  |  102  |  81.6<H>  ----------------------------<  83  5.6<H>   |  20.0<L>  |  9.01<H>    Ca    7.9<L>      09 Mar 2022 04:19            MICROBIOLOGY:       PATHOLOGY:

## 2022-09-07 NOTE — OP REPORT
Date of Service: 9/7/2022       Patient: Balbina Randall 47 y.o. female     MRN: [id number]      Physician/s: Davide Bartholomew MD     Pre-operative Diagnosis: Lumbosacral spondylosis, facet arthropathy. The patient was NOT seen for lumbar radiculopathy today.      Post-operative Diagnosis: Lumbosacral spondylosis, facet arthropathy. The patient was NOT seen for lumbar radiculopathy today.      Procedure: Medial Branch Blocks targeting the bilateral  L3-4 and L4-5  facet joints      Description of procedure:     The risks, benefits, and alternatives of the procedure were reviewed and discussed with the patient.  Written informed consent was freely obtained. A pre-procedural time-out was conducted by the physician verifying patient’s identity, procedure to be performed, procedure site and side, and allergy verification. Appropriate equipment was determined to be in place for the procedure.            In the fluoroscopy suite the patient was placed in a prone position and the skin was prepped and draped in the usual sterile fashion. The fluoroscope was placed over the lower back at the appropriate angles, and the targets for injection were marked. A 27g needle was placed into each of the markings at the levels below, and approx 1cc of 1% Lidocaine was injected subcutaneously into the epidermal and dermal layers. The needle was removed intact.     Using an oblique view, A 22g 5 inch needle was then placed at the intersection of the transverse process and superior articular process at the L3-4 facet joint where the L2 medial branch runs on the right side. The needle tips were then verified by AP, oblique, and lateral views.      Using an oblique view, A 22g 5 inch needle was then placed at the intersection of the transverse process and superior articular process at the L4-5 facet joint where the L3 medial branch runs  on the right side. The needle tips were then verified by AP, oblique, and lateral views.      Using an  oblique view, A 22g 5 inch needle was then placed at the intersection of the transverse process and superior articular process at the L5-S1 facet joint where the L4 medial branch runs  on the right side. The needle tips were then verified by AP, oblique, and lateral views.      Using an oblique view, A 22g 5 inch needle was then placed at the intersection of the transverse process and superior articular process at the L3-4 facet joint where the L2 medial branch runs  on the left side. The needle tips were then verified by AP, oblique, and lateral views.         Using an oblique view, A 22g 5 inch needle was then placed at the intersection of the transverse process and superior articular process at the L4-5 facet joint where the L3 medial branch runs  on the left side. The needle tips were then verified by AP, oblique, and lateral views.      Using an oblique view, A 22g 5 inch needle was then placed at the intersection of the transverse process and superior articular process at the L5-S1 facet joint where the L4 medial branch runs  on the left side. The needle tips were then verified by AP, oblique, and lateral views.         In the AP view, less than 1 cc of contrast dye was used to highlight the medial branch while the fluoroscope was running live at the levels above. Following negative aspiration, approximately 1mL of 2% lidocaine  was then injected at the above levels, and the needles were removed intact after restyleted. The patient's back was covered with a 4x4 gauze, the area was cleansed with sterile normal saline, and a dressing was applied.      There were no complications noted, the patient was hemodynamically stable, and tolerated the procedure well.      The patient had 100% pain relief of the index pain minutes post procedure.  She was given a pain diary    Follow-up as scheduled        Davide Bartholomew MD  Physical Medicine and Rehabilitation  Interventional Spine and Sports Physiatry  [facility]              CPT  Intraarticular joint or medial branch block (MBB) - lumbar or sacral (1st level):  66191-22-ve  Intraarticular joint or medial branch block (MBB) - lumbar or sacral (2nd level):  [id number]

## 2022-09-08 ENCOUNTER — TELEPHONE (OUTPATIENT)
Dept: PHYSICAL MEDICINE AND REHAB | Facility: MEDICAL CENTER | Age: 47
End: 2022-09-08
Payer: COMMERCIAL

## 2022-09-12 ENCOUNTER — OFFICE VISIT (OUTPATIENT)
Dept: PHYSICAL MEDICINE AND REHAB | Facility: MEDICAL CENTER | Age: 47
End: 2022-09-12
Payer: COMMERCIAL

## 2022-09-12 VITALS
BODY MASS INDEX: 41.77 KG/M2 | HEIGHT: 66 IN | SYSTOLIC BLOOD PRESSURE: 118 MMHG | HEART RATE: 61 BPM | DIASTOLIC BLOOD PRESSURE: 64 MMHG | OXYGEN SATURATION: 97 % | WEIGHT: 259.92 LBS | TEMPERATURE: 97.1 F

## 2022-09-12 DIAGNOSIS — M25.559 CHRONIC HIP PAIN, UNSPECIFIED LATERALITY: ICD-10-CM

## 2022-09-12 DIAGNOSIS — M47.816 LUMBAR SPONDYLOSIS: ICD-10-CM

## 2022-09-12 DIAGNOSIS — M54.16 LUMBAR RADICULOPATHY: ICD-10-CM

## 2022-09-12 DIAGNOSIS — G89.29 CHRONIC HIP PAIN, UNSPECIFIED LATERALITY: ICD-10-CM

## 2022-09-12 DIAGNOSIS — M51.36 LUMBAR DEGENERATIVE DISC DISEASE: ICD-10-CM

## 2022-09-12 PROCEDURE — 99214 OFFICE O/P EST MOD 30 MIN: CPT | Performed by: PHYSICAL MEDICINE & REHABILITATION

## 2022-09-12 ASSESSMENT — FIBROSIS 4 INDEX: FIB4 SCORE: 1.03

## 2022-09-12 ASSESSMENT — PAIN SCALES - GENERAL: PAINLEVEL: 3=SLIGHT PAIN

## 2022-09-12 ASSESSMENT — PATIENT HEALTH QUESTIONNAIRE - PHQ9: CLINICAL INTERPRETATION OF PHQ2 SCORE: 0

## 2022-09-12 NOTE — PATIENT INSTRUCTIONS
Your procedure will be at the [facility] special procedure suite.    [facility] [place] [address]       PRE-PROCEDURE INSTRUCTIONS  You may take your regular medications except:   No Anti-inflammatories 5 days prior to your procedure. Anti-inflammatories include medicines such as  ibuprofen (Motrin, Advil), Excedrin, Naproxen (Aleve, Anaprox, Naprelan, Naprosyn), Celecoxib (Celebrex), Diclofenac (Voltaren-XR tab), and Meloxicam (Mobic).   You can take the remainder of your pain medications as prescribed.   If you are having a diagnostic procedure such as a medial branch block, do not use her pain meds on the day of the procedure  No Glucophage or Metformin 24 hours before your procedure. You may resume next day after your procedure.  Call the physiatry office if you are taking or prescribed anti-biotics within five days of procedure.  Please ask provider if you are taking any new diabetes medication.  CONTINUE TAKING BLOOD PRESSURE MEDICATIONS AS PRESCRIBED.  Pain medications will not be prescribed on the procedure day. Procedural pain medication may be used by your provider   Call your doctor's office performing the procedure if you have a fever, chills, rash or new illness prior to your procedure    Anticoagulation/antiplatelet medications  No Blood thinning medications such as Coumadin, Xarelto, aspirin or Plavix 5 days prior to procedure unless your doctor said to continue these medications. Call your doctor if a new medication is prescribed in this class.     Restrictions for eating before procedure:   If you are getting procedural sedation, then do not eat to for 8 hours prior to procedure appointment time. Do not drink fluids for four hours prior to your procedure time.   If you are not having procedural sedation, then Skip the meal prior to your procedure. If you have a morning procedure then skip breakfast. If you have an afternoon procedure then skip lunch.   You may drink clear liquids  up to 2 hours prior to your procedure  You must have a  the day of procedure to accompany you home.      POST PROCEDURE INSTRUCTIONS   No heavy lifting, strenuous bending or strenuous exercise for 3 days after your procedure.  No hot tubs, baths, swimming for 3 days after your procedure  You can remove the bandage the day after the procedure.  IF YOU RECEIVED A RADIOFREQUENCY PROCEDURE, THERE MAY BE SOME SORENESS AFTER THE PROCEDURE FOR A FEW WEEKS.  THIS IS NORMAL.  IF YOU EXPERIENCE PROLONGED WEAKNESS LONGER THAN ONE DAY, BOWEL OR BLADDER INCONTINENCE THEN PLEASE CALL THE PHYSIATRY OFFICE.  Your leg may feel heavy, weak and numb for up to 1-2 days. Be very careful walking.    You may resume normal activities 3 days after procedure.

## 2022-09-12 NOTE — H&P (VIEW-ONLY)
Follow up patient note  Interventional spine and Pain  Physiatry (physical medicine and  Rehabilitation)       Chief complaint:   Chief Complaint   Patient presents with    Follow-Up     Back pain          HISTORY    Please see new patient note by Dr Bartholomew,  for more details.     HPI  Patient identification: Balbina Randall ,  1975,   With Diagnoses of Lumbar spondylosis, Lumbar radiculopathy, Chronic hip pain, and Lumbar degenerative disc disease were pertinent to this visit.       Procedure:  2022 left L3-4 and L4-5 transforaminal epidural steroid injection.  60% improved postprocedure   2022 medial branch block starting the bilateral L3-4 and L4-5 facet joints 100% pain relief during the diagnostic phase  2022 medial branch block starting the bilateral L3-4 and L4-5 facet joints 100% pain relief during the diagnostic phase    The patient had excellent results during the diagnostic phase of medial branch blocks as above.  The pain is returned back to its baseline after the medial branch block diagnostic.  The pain is moderate intensity bilateral axial aching quality nonradiating pain worse with lumbar extension.  Denies leg pain, denies numbness tingling weakness.  Pain is chronic and has been present for greater than 6 months.  She is done a comprehensive pain program including the past 4 months.          Medications tried include toradol, zanaflex, percocet, oral steroids, mobic.          ROS Red Flags :   Fever, Chills, Sweats: Denies  Involuntary Weight Loss: Denies  Bowel/Bladder Incontinence: Denies  Saddle Anesthesia: Denies        PMHx:   Past Medical History:   Diagnosis Date    Airway problem 1/19/15    abnormal airway assessment as noted on procedure orders per MD (Dr. Serrato)    Heart burn     Indigestion     Other specified symptom associated with female genital organs     ovarian cyst, adhesions    Harleen-Parkinson-White syndrome     WPW (Harleen-Parkinson-White syndrome)  since birth    cardiologist, Dr. So       PSHx:   Past Surgical History:   Procedure Laterality Date    LUMBAR MEDIAL BRANCH BLOCKS Bilateral 9/7/2022    Procedure: Diagnostic medial branch blocks targeting the BILATERAL L3-4 and L4-5 facet joints with fluoroscopic guidance #2;  Surgeon: Davide Bartholomew M.D.;  [facility];  Service: Pain Management    LUMBAR MEDIAL BRANCH BLOCKS Bilateral 8/31/2022    Procedure: Diagnostic media branch blocks targeting the BILATERAL L3-4 and L4-5 facet joints with fluoroscopic guidance;  Surgeon: Davide Bartholomew M.D.;  [facility];  Service: Pain Management    LUMBAR TRANSFORAMINAL EPIDURAL STEROID INJECTION Left 4/19/2022    Procedure: LEFT L3-4 and L4-5 transforaminal epidural steroid injection with fluoroscopic guidance;  Surgeon: Davide Bartholomew M.D.;  [facility];  Service: Pain Management    BLOCK EPIDURAL STEROID INJECTION Left 4/19/2022    Procedure: .;  Surgeon: Davide Bartholomew M.D.;  [facility];  Service: Pain Management    GA SHLDR ARTHROSCOP,PART ACROMIOPLAS Right 1/29/2020    Procedure: DECOMPRESSION, SHOULDER, ARTHROSCOPIC - SUBACROMIAL, EXTENSIVE DEBRIDEMENT;  Surgeon: Robert Park M.D.;  Location: [facility];  Service: Orthopedics    CLAVICLE DISTAL EXCISION Right 1/29/2020    Procedure: EXCISION, CLAVICLE, DISTAL;  Surgeon: Robert Park M.D.;  Location: [facility];  Service: Orthopedics    ROTATOR CUFF REPAIR Right 1/29/2020    Procedure: REPAIR, ROTATOR CUFF;  Surgeon: Robert Park M.D.;  Location: [facility];  Service: Orthopedics    HYSTEROSCOPY NOVASURE-2  4/27/2017    Procedure: HYSTEROSCOPY NOVASURE;  Surgeon: Lindsay Webb M.D.;  Location: [facility]ERY [facility];  Service:     WRIST ARTHROSCOPY Right 1/13/2016    Procedure: WRIST ARTHROSCOPY with debridment  ;  Surgeon: Jose Daniel YOUNG  "FELICITAS Red[place];  Location: SURGERY [facility];  Service:     LIGAMENT REPAIR  1/13/2016    Procedure: LIGAMENT REPAIR;  Surgeon: Jose Daniel Redmond M.D.;  Location: SUR[facility]ERY [facility];  Service:     COLONOSCOPY WITH BIOPSY  1/26/2015    Performed by Mauricio Serrato M.D. at SURGERY [facility]    GASTROSCOPY WITH BIOPSY  1/26/2015    Performed by Mauricio Serrato M.D. at [facility]    HYSTEROSCOPY WITH VIDEO DIAGNOSTIC  5/24/2013    Performed by Robert Rosario M.D. at [facility]    KIRT BY LAPAROSCOPY  9/2/2010    Performed by KIM ZHAO at [facility]    PRIMARY C SECTION  4/30/2010    Performed by DAXA MILLAN at [facility]    BOWEL RESECTION  1992    \"adhesions\"    APPENDECTOMY  1992    LAPAROSCOPY  1992, 2002    OTHER      kenalog inj left foot       Family history   Family History   Problem Relation Age of Onset    Hypertension Other     Cancer Other          Medications:   Outpatient Medications Marked as Taking for the 9/12/22 encounter (Office Visit) with Davide Bartholomew M.D.   Medication Sig Dispense Refill    tizanidine (ZANAFLEX) 4 MG Tab Take 1 Tablet by mouth at bedtime as needed (pain). 30 Tablet 5    gabapentin (NEURONTIN) 300 MG Cap Take 1-2 Capsules by mouth 3 times a day as needed (pain). 180 Capsule 5        Current Outpatient Medications on File Prior to Visit   Medication Sig Dispense Refill    tizanidine (ZANAFLEX) 4 MG Tab Take 1 Tablet by mouth at bedtime as needed (pain). 30 Tablet 5    gabapentin (NEURONTIN) 300 MG Cap Take 1-2 Capsules by mouth 3 times a day as needed (pain). 180 Capsule 5    estradiol (VAGIFEM) 10 MCG Tab insert one tablet vaginally two times a week      acetaminophen (TYLENOL) 500 MG Tab Take 2 Tablets by mouth 3 times a day as needed (pain.  Do not exceed 3000 mg of total acetaminophen per day). 180 Tablet 6    sucralfate (CARAFATE) 1 GM/10ML Suspension       pantoprazole (PROTONIX) 40 " "MG Tablet Delayed Response        No current facility-administered medications on file prior to visit.         Allergies:   Allergies   Allergen Reactions    Latex Rash     [id number]    Other Drug Rash     Allergic to Estrogen IM - preservative within medication  [id number]    Sulfa Drugs Rash and Itching     [id number]       Social Hx:   Social History     Socioeconomic History    Marital status:      Spouse name: Not on file    Number of children: Not on file    Years of education: Not on file    Highest education level: Not on file   Occupational History    Not on file   Tobacco Use    Smoking status: Never    Smokeless tobacco: Never   Vaping Use    Vaping Use: Never used   Substance and Sexual Activity    Alcohol use: Yes     Alcohol/week: 0.0 oz     Comment: 3-4 month    Drug use: No    Sexual activity: Not on file   Other Topics Concern    Not on file   Social History Narrative    Not on file     Social Determinants of Health     Financial Resource Strain: Not on file   Food Insecurity: Not on file   Transportation Needs: Not on file   Physical Activity: Not on file   Stress: Not on file   Social Connections: Not on file   Intimate Partner Violence: Not on file   Housing Stability: Not on file         EXAMINATION     Physical Exam:   Vitals: /64 (BP Location: Right arm, Patient Position: Sitting, BP Cuff Size: Adult)   Pulse 61   Temp 36.2 °C (97.1 °F) (Temporal)   Ht 1.676 m (5' 6\")   Wt 118 kg (259 lb 14.8 oz)   SpO2 97%     Constitutional:   Body Habitus: Body mass index is 41.95 kg/m².  Cooperation: Fully cooperates with exam  Appearance: Well-groomed no disheveled    Respiratory-  breathing comfortable on room air, no audible wheezing  Cardiovascular- capillary refills less than 2 seconds. No lower extremity edema is noted.   Psychiatric- alert and oriented ×3. Normal affect.    MSK and Neuro: -      Thoracic/Lumbar Spine/Sacral Spine/Hips   There are no signs of infection around the " injection sites.   decreased active range of motion with flexion, lateral flexion, and rotation bilaterally.   There is decreased active range of motion with lumbar extension.    There is  pain with lumbar extension.   There is pain with facet loading maneuver (extension rotation) with axial low back pain on the BILATERAL side(s)    Palpation:   No tenderness to palpation in midline at T1-T12 levels. No tenderness to palpation in the left and right of the midline T1-L5, NEGATIVE for tenderness to palpation to the para-midline region in the lower lumbar levels.  palpation over SI joint: negative bilaterally    palpation in hip or over the gluteus medius tendon insertion: negative bilaterally        Key points for the international standards for neurological classification of spinal cord injury (ISNCSCI) to light touch.     Dermatome R L                                      L2 2 2   L3 2 2   L4 2 2   L5 2 2   S1 2 2   S2 2 2         Motor Exam Lower Extremities    ? Myotome R L   Hip flexion L2 5 5   Knee extension L3 5 5   Ankle dorsiflexion L4 5 5   Toe extension L5 5 5   Ankle plantarflexion S1 5 5             MEDICAL DECISION MAKING    DATA    Labs:   Lab Results   Component Value Date/Time    SODIUM 142 05/11/2021 05:10 PM    POTASSIUM 4.1 05/11/2021 05:10 PM    CHLORIDE 109 05/11/2021 05:10 PM    CO2 24 05/11/2021 05:10 PM    GLUCOSE 109 (H) 05/11/2021 05:10 PM    BUN 25 (H) 05/11/2021 05:10 PM    CREATININE 0.97 05/11/2021 05:10 PM        Lab Results   Component Value Date/Time    PROTHROMBTM 12.8 05/18/2012 08:57 AM    INR 0.95 05/18/2012 08:57 AM        Lab Results   Component Value Date/Time    WBC 8.7 05/11/2021 05:10 PM    RBC 4.60 05/11/2021 05:10 PM    HEMOGLOBIN 14.0 05/11/2021 05:10 PM    HEMATOCRIT 43.6 05/11/2021 05:10 PM    MCV 94.8 05/11/2021 05:10 PM    MCH 30.4 05/11/2021 05:10 PM    MCHC 32.1 (L) 05/11/2021 05:10 PM    MPV 12.3 05/11/2021 05:10 PM    NEUTSPOLYS 53.00 05/11/2021 05:10 PM     LYMPHOCYTES 33.90 05/11/2021 05:10 PM    MONOCYTES 7.90 05/11/2021 05:10 PM    EOSINOPHILS 4.00 05/11/2021 05:10 PM    BASOPHILS 0.70 05/11/2021 05:10 PM        No results found for: HBA1C       Imaging:   I personally reviewed following images    MRI lumbar spine 4/14/2022  There are no areas of high-grade neuroforaminal or central canal stenosis.  There is facet arthropathy worst on the left at L3-4, L4-5, L5-S1.  Degenerative disc disease at L4-5 and L5-S1.  High intensity zone's at the left paracentric region at L4-5 and L5-S1 which can be consistent with an annular tear.      MRI lumbar spine 7/27/2015  No areas of high-grade central stenosis.  Tiny disc protrusions at L4-5 and L5-S1.    I reviewed the following radiology reports    MRI left hip 7/27/2015  IMPRESSION:   1.  Gluteal tendinopathy and greater trochanteric bursitis.  2.  Ischia femoral impingement.   3.  3.9 cm left ovarian cyst.           Results for orders placed during the hospital encounter of 04/14/22    MR-LUMBAR SPINE-W/O    Impression  1.  Mild degenerative disc disease at L4-5 and L5-S1 as described. Central annular fissure posterior disc is unchanged at both levels.    2.  Facet arthropathy at L3-L4, left greater than right.                                                        Results for orders placed during the hospital encounter of 04/23/21    CT-CHEST (THORAX) WITH    Impression  1.  No evidence of focal consolidation or pleural effusion.    2.  No evidence of mediastinal or hilar adenopathy.    3.  Fatty liver.    4.  Prior cholecystectomy.            Results for orders placed during the hospital encounter of 05/02/18    CT-ABDOMEN WITH & W/O    Impression  Unremarkable CT scan of the abdomen with attention to the pancreas.    Status post cholecystectomy.       Results for orders placed during the hospital encounter of 07/01/16    CT-ABDOMEN-PELVIS WITH    Impression  Unremarkable contrast-enhanced CT of the abdomen and pelvis  though appendix not visualized.                   Results for orders placed during the hospital encounter of 10/03/14    DX-ANKLE 3+ VIEWS    Impression  1.  Soft tissue swelling with no acute fracture.  2.  Mild degenerative changes talonavicular joint and possible focal area of osteonecrosis articular surface talus.        Results for orders placed in visit on 21    DX-ELBOW-LIMITED 2- LEFT             Results for orders placed during the hospital encounter of 04/07/15    DX-HIP-COMPLETE - UNILATERAL 2+    Impression  Unremarkable examination of the left hip.   Results for orders placed during the hospital encounter of 09    DX-HIPS-COMPLETE - BILATERAL 3+    Impression  IMPRESSION:    NEGATIVE EXAMINATION OF BOTH HIPS.    SST:dxm      Read By MISHA FIGUEROA MD on 2009  4:37PM  : DXM Transcription Date: 2009  8:27PM  THIS DOCUMENT HAS BEEN ELECTRONICALLY SIGNED BY: MISHA FIGUEROA MD on  2009  2:34PM        Results for orders placed during the hospital encounter of 22    DX-LUMBAR SPINE-2 OR 3 VIEWS    Impression  1.  Minimal retrolisthesis of L1 on L2.  2.  Mild degenerative changes at L5/S1.  3.  Mild facet arthropathy in the lower lumbar spine.              Results for orders placed during the hospital encounter of 09    DX-THORACIC SPINE-WITH SWIMMERS VIEW    Impression  IMPRESSION:    1. MINIMAL ANTERIOR ENDPLATE OSTEOPHYTOSIS INFERIORLY.  OTHERWISE  NEGATIVE EXAMINATION OF THE THORACIC SPINE.    SST:dxm       Results for orders placed during the hospital encounter of 04/20/15    DX-WRIST-LIMITED 2-    Impression  No acute osseous abnormality is identified.        DIAGNOSIS   Visit Diagnoses     ICD-10-CM   1. Lumbar spondylosis  M47.816   2. Lumbar radiculopathy  M54.16   3. Chronic hip pain  M25.559    G89.29   4. Lumbar degenerative disc disease  M51.36         ASSESSMENT and PLAN:     Balbina Randall  1975 female      Balbina  was seen today for follow-up.    Diagnoses and all orders for this visit:    Lumbar spondylosis  Comments:  Not controlled, see below  Orders:  -     Referral to Physical Medicine Rehab; Future  -     Referral to Physical Medicine Rehab    Lumbar radiculopathy  Comments:  Stable after epidural steroid injection    Chronic hip pain  Comments:  Stable, continue home exercise program    Lumbar degenerative disc disease  Comments:  Stable, continue home exercise program        Status post diagnostic medial branch blocks x2 targeting the BILATERAL L3-4 and L4-5 facet joints with greater than 100% pain relief during the diagnostic phase.  This represents a positive diagnostic block.    I believe the patient is a good candidate for BILATERAL radiofrequency neurotomy targeting the medial branches innervating the L3-4 and L4-5 facet joints    The risks benefits and alternatives to this procedure were discussed and the patient wishes to proceed with the procedure. Risks include but are not limited to damage to surrounding structures, infection, bleeding, worsening of pain which can be permanent, weakness which can be permanent. Benefits include pain relief, improved function. Alternatives includes not doing the procedure.       Medications: Continue Zanaflex, gabapentin, Tylenol as needed      Follow up: After the above procedure    Thank you for allowing me to participate in the care of this patient. If you have any questions please not hesitate to contact me.             Please note that this dictation was created using voice recognition software. I have made every reasonable attempt to correct obvious errors but there may be errors of grammar and content that I may have overlooked prior to finalization of this note.      Davide Bartholomew MD  Interventional Spine and Sports Physiatry  Physical Medicine and Rehabilitation  [facility]

## 2022-09-12 NOTE — PROGRESS NOTES
Follow up patient note  Interventional spine and Pain  Physiatry (physical medicine and  Rehabilitation)       Chief complaint:   Chief Complaint   Patient presents with    Follow-Up     Back pain          HISTORY    Please see new patient note by Dr Bartholomew,  for more details.     HPI  Patient identification: Balbina Randall ,  1975,   With Diagnoses of Lumbar spondylosis, Lumbar radiculopathy, Chronic hip pain, and Lumbar degenerative disc disease were pertinent to this visit.       Procedure:  2022 left L3-4 and L4-5 transforaminal epidural steroid injection.  60% improved postprocedure   2022 medial branch block starting the bilateral L3-4 and L4-5 facet joints 100% pain relief during the diagnostic phase  2022 medial branch block starting the bilateral L3-4 and L4-5 facet joints 100% pain relief during the diagnostic phase    The patient had excellent results during the diagnostic phase of medial branch blocks as above.  The pain is returned back to its baseline after the medial branch block diagnostic.  The pain is moderate intensity bilateral axial aching quality nonradiating pain worse with lumbar extension.  Denies leg pain, denies numbness tingling weakness.  Pain is chronic and has been present for greater than 6 months.  She is done a comprehensive pain program including the past 4 months.          Medications tried include toradol, zanaflex, percocet, oral steroids, mobic.          ROS Red Flags :   Fever, Chills, Sweats: Denies  Involuntary Weight Loss: Denies  Bowel/Bladder Incontinence: Denies  Saddle Anesthesia: Denies        PMHx:   Past Medical History:   Diagnosis Date    Airway problem 1/19/15    abnormal airway assessment as noted on procedure orders per MD (Dr. Serrato)    Heart burn     Indigestion     Other specified symptom associated with female genital organs     ovarian cyst, adhesions    Harleen-Parkinson-White syndrome     WPW (Harleen-Parkinson-White syndrome)  since birth    cardiologist, Dr. So       PSHx:   Past Surgical History:   Procedure Laterality Date    LUMBAR MEDIAL BRANCH BLOCKS Bilateral 9/7/2022    Procedure: Diagnostic medial branch blocks targeting the BILATERAL L3-4 and L4-5 facet joints with fluoroscopic guidance #2;  Surgeon: Davide Bartholomew M.D.;  [facility];  Service: Pain Management    LUMBAR MEDIAL BRANCH BLOCKS Bilateral 8/31/2022    Procedure: Diagnostic media branch blocks targeting the BILATERAL L3-4 and L4-5 facet joints with fluoroscopic guidance;  Surgeon: Davide Bartholomew M.D.;  [facility];  Service: Pain Management    LUMBAR TRANSFORAMINAL EPIDURAL STEROID INJECTION Left 4/19/2022    Procedure: LEFT L3-4 and L4-5 transforaminal epidural steroid injection with fluoroscopic guidance;  Surgeon: Davide Bartholomew M.D.;  [facility];  Service: Pain Management    BLOCK EPIDURAL STEROID INJECTION Left 4/19/2022    Procedure: .;  Surgeon: Davide Bartholomew M.D.;  [facility];  Service: Pain Management    UT SHLDR ARTHROSCOP,PART ACROMIOPLAS Right 1/29/2020    Procedure: DECOMPRESSION, SHOULDER, ARTHROSCOPIC - SUBACROMIAL, EXTENSIVE DEBRIDEMENT;  Surgeon: Robert Park M.D.;  Location: [facility];  Service: Orthopedics    CLAVICLE DISTAL EXCISION Right 1/29/2020    Procedure: EXCISION, CLAVICLE, DISTAL;  Surgeon: Robert Park M.D.;  Location: [facility];  Service: Orthopedics    ROTATOR CUFF REPAIR Right 1/29/2020    Procedure: REPAIR, ROTATOR CUFF;  Surgeon: Robert Park M.D.;  Location: [facility];  Service: Orthopedics    HYSTEROSCOPY NOVASURE-2  4/27/2017    Procedure: HYSTEROSCOPY NOVASURE;  Surgeon: Lindsay Webb M.D.;  Location: [facility]ERY [facility];  Service:     WRIST ARTHROSCOPY Right 1/13/2016    Procedure: WRIST ARTHROSCOPY with debridment  ;  Surgeon: Jose Daniel YOUNG  "FELICITAS Redmond;  Location: SURGERY [facility];  Service:     LIGAMENT REPAIR  1/13/2016    Procedure: LIGAMENT REPAIR;  Surgeon: Jose Daniel Redmond M.D.;  Location: SUR[facility]ERY [facility];  Service:     COLONOSCOPY WITH BIOPSY  1/26/2015    Performed by Mauricio Serrato M.D. at SURGERY [facility]    GASTROSCOPY WITH BIOPSY  1/26/2015    Performed by Mauricio Serrato M.D. at [facility]    HYSTEROSCOPY WITH VIDEO DIAGNOSTIC  5/24/2013    Performed by Robert Rosario M.D. at [facility]    KIRT BY LAPAROSCOPY  9/2/2010    Performed by KIM ZHAO at [facility]    PRIMARY C SECTION  4/30/2010    Performed by DAXA MILLAN at [facility]    BOWEL RESECTION  1992    \"adhesions\"    APPENDECTOMY  1992    LAPAROSCOPY  1992, 2002    OTHER      kenalog inj left foot       Family history   Family History   Problem Relation Age of Onset    Hypertension Other     Cancer Other          Medications:   Outpatient Medications Marked as Taking for the 9/12/22 encounter (Office Visit) with Davide Bartholomew M.D.   Medication Sig Dispense Refill    tizanidine (ZANAFLEX) 4 MG Tab Take 1 Tablet by mouth at bedtime as needed (pain). 30 Tablet 5    gabapentin (NEURONTIN) 300 MG Cap Take 1-2 Capsules by mouth 3 times a day as needed (pain). 180 Capsule 5        Current Outpatient Medications on File Prior to Visit   Medication Sig Dispense Refill    tizanidine (ZANAFLEX) 4 MG Tab Take 1 Tablet by mouth at bedtime as needed (pain). 30 Tablet 5    gabapentin (NEURONTIN) 300 MG Cap Take 1-2 Capsules by mouth 3 times a day as needed (pain). 180 Capsule 5    estradiol (VAGIFEM) 10 MCG Tab insert one tablet vaginally two times a week      acetaminophen (TYLENOL) 500 MG Tab Take 2 Tablets by mouth 3 times a day as needed (pain.  Do not exceed 3000 mg of total acetaminophen per day). 180 Tablet 6    sucralfate (CARAFATE) 1 GM/10ML Suspension       pantoprazole (PROTONIX) 40 " "MG Tablet Delayed Response        No current facility-administered medications on file prior to visit.         Allergies:   Allergies   Allergen Reactions    Latex Rash     AGK=0948    Other Drug Rash     Allergic to Estrogen IM - preservative within medication  FNI=0346    Sulfa Drugs Rash and Itching     HEE=1101       Social Hx:   Social History     Socioeconomic History    Marital status:      Spouse name: Not on file    Number of children: Not on file    Years of education: Not on file    Highest education level: Not on file   Occupational History    Not on file   Tobacco Use    Smoking status: Never    Smokeless tobacco: Never   Vaping Use    Vaping Use: Never used   Substance and Sexual Activity    Alcohol use: Yes     Alcohol/week: 0.0 oz     Comment: 3-4 month    Drug use: No    Sexual activity: Not on file   Other Topics Concern    Not on file   Social History Narrative    Not on file     Social Determinants of Health     Financial Resource Strain: Not on file   Food Insecurity: Not on file   Transportation Needs: Not on file   Physical Activity: Not on file   Stress: Not on file   Social Connections: Not on file   Intimate Partner Violence: Not on file   Housing Stability: Not on file         EXAMINATION     Physical Exam:   Vitals: /64 (BP Location: Right arm, Patient Position: Sitting, BP Cuff Size: Adult)   Pulse 61   Temp 36.2 °C (97.1 °F) (Temporal)   Ht 1.676 m (5' 6\")   Wt 118 kg (259 lb 14.8 oz)   SpO2 97%     Constitutional:   Body Habitus: Body mass index is 41.95 kg/m².  Cooperation: Fully cooperates with exam  Appearance: Well-groomed no disheveled    Respiratory-  breathing comfortable on room air, no audible wheezing  Cardiovascular- capillary refills less than 2 seconds. No lower extremity edema is noted.   Psychiatric- alert and oriented ×3. Normal affect.    MSK and Neuro: -      Thoracic/Lumbar Spine/Sacral Spine/Hips   There are no signs of infection around the " injection sites.   decreased active range of motion with flexion, lateral flexion, and rotation bilaterally.   There is decreased active range of motion with lumbar extension.    There is  pain with lumbar extension.   There is pain with facet loading maneuver (extension rotation) with axial low back pain on the BILATERAL side(s)    Palpation:   No tenderness to palpation in midline at T1-T12 levels. No tenderness to palpation in the left and right of the midline T1-L5, NEGATIVE for tenderness to palpation to the para-midline region in the lower lumbar levels.  palpation over SI joint: negative bilaterally    palpation in hip or over the gluteus medius tendon insertion: negative bilaterally        Key points for the international standards for neurological classification of spinal cord injury (ISNCSCI) to light touch.     Dermatome R L                                      L2 2 2   L3 2 2   L4 2 2   L5 2 2   S1 2 2   S2 2 2         Motor Exam Lower Extremities    ? Myotome R L   Hip flexion L2 5 5   Knee extension L3 5 5   Ankle dorsiflexion L4 5 5   Toe extension L5 5 5   Ankle plantarflexion S1 5 5             MEDICAL DECISION MAKING    DATA    Labs:   Lab Results   Component Value Date/Time    SODIUM 142 05/11/2021 05:10 PM    POTASSIUM 4.1 05/11/2021 05:10 PM    CHLORIDE 109 05/11/2021 05:10 PM    CO2 24 05/11/2021 05:10 PM    GLUCOSE 109 (H) 05/11/2021 05:10 PM    BUN 25 (H) 05/11/2021 05:10 PM    CREATININE 0.97 05/11/2021 05:10 PM        Lab Results   Component Value Date/Time    PROTHROMBTM 12.8 05/18/2012 08:57 AM    INR 0.95 05/18/2012 08:57 AM        Lab Results   Component Value Date/Time    WBC 8.7 05/11/2021 05:10 PM    RBC 4.60 05/11/2021 05:10 PM    HEMOGLOBIN 14.0 05/11/2021 05:10 PM    HEMATOCRIT 43.6 05/11/2021 05:10 PM    MCV 94.8 05/11/2021 05:10 PM    MCH 30.4 05/11/2021 05:10 PM    MCHC 32.1 (L) 05/11/2021 05:10 PM    MPV 12.3 05/11/2021 05:10 PM    NEUTSPOLYS 53.00 05/11/2021 05:10 PM     LYMPHOCYTES 33.90 05/11/2021 05:10 PM    MONOCYTES 7.90 05/11/2021 05:10 PM    EOSINOPHILS 4.00 05/11/2021 05:10 PM    BASOPHILS 0.70 05/11/2021 05:10 PM        No results found for: HBA1C       Imaging:   I personally reviewed following images    MRI lumbar spine 4/14/2022  There are no areas of high-grade neuroforaminal or central canal stenosis.  There is facet arthropathy worst on the left at L3-4, L4-5, L5-S1.  Degenerative disc disease at L4-5 and L5-S1.  High intensity zone's at the left paracentric region at L4-5 and L5-S1 which can be consistent with an annular tear.      MRI lumbar spine 7/27/2015  No areas of high-grade central stenosis.  Tiny disc protrusions at L4-5 and L5-S1.    I reviewed the following radiology reports    MRI left hip 7/27/2015  IMPRESSION:   1.  Gluteal tendinopathy and greater trochanteric bursitis.  2.  Ischia femoral impingement.   3.  3.9 cm left ovarian cyst.           Results for orders placed during the hospital encounter of 04/14/22    MR-LUMBAR SPINE-W/O    Impression  1.  Mild degenerative disc disease at L4-5 and L5-S1 as described. Central annular fissure posterior disc is unchanged at both levels.    2.  Facet arthropathy at L3-L4, left greater than right.                                                        Results for orders placed during the hospital encounter of 04/23/21    CT-CHEST (THORAX) WITH    Impression  1.  No evidence of focal consolidation or pleural effusion.    2.  No evidence of mediastinal or hilar adenopathy.    3.  Fatty liver.    4.  Prior cholecystectomy.            Results for orders placed during the hospital encounter of 05/02/18    CT-ABDOMEN WITH & W/O    Impression  Unremarkable CT scan of the abdomen with attention to the pancreas.    Status post cholecystectomy.       Results for orders placed during the hospital encounter of 07/01/16    CT-ABDOMEN-PELVIS WITH    Impression  Unremarkable contrast-enhanced CT of the abdomen and pelvis  though appendix not visualized.                   Results for orders placed during the hospital encounter of 10/03/14    DX-ANKLE 3+ VIEWS    Impression  1.  Soft tissue swelling with no acute fracture.  2.  Mild degenerative changes talonavicular joint and possible focal area of osteonecrosis articular surface talus.        Results for orders placed in visit on 21    DX-ELBOW-LIMITED 2- LEFT             Results for orders placed during the hospital encounter of 04/07/15    DX-HIP-COMPLETE - UNILATERAL 2+    Impression  Unremarkable examination of the left hip.   Results for orders placed during the hospital encounter of 09    DX-HIPS-COMPLETE - BILATERAL 3+    Impression  IMPRESSION:    NEGATIVE EXAMINATION OF BOTH HIPS.    SST:dxm      Read By MISHA FIGUEROA MD on 2009  4:37PM  : DXM Transcription Date: 2009  8:27PM  THIS DOCUMENT HAS BEEN ELECTRONICALLY SIGNED BY: MISHA FIGUEROA MD on  2009  2:34PM        Results for orders placed during the hospital encounter of 22    DX-LUMBAR SPINE-2 OR 3 VIEWS    Impression  1.  Minimal retrolisthesis of L1 on L2.  2.  Mild degenerative changes at L5/S1.  3.  Mild facet arthropathy in the lower lumbar spine.              Results for orders placed during the hospital encounter of 09    DX-THORACIC SPINE-WITH SWIMMERS VIEW    Impression  IMPRESSION:    1. MINIMAL ANTERIOR ENDPLATE OSTEOPHYTOSIS INFERIORLY.  OTHERWISE  NEGATIVE EXAMINATION OF THE THORACIC SPINE.    SST:dxm       Results for orders placed during the hospital encounter of 04/20/15    DX-WRIST-LIMITED 2-    Impression  No acute osseous abnormality is identified.        DIAGNOSIS   Visit Diagnoses     ICD-10-CM   1. Lumbar spondylosis  M47.816   2. Lumbar radiculopathy  M54.16   3. Chronic hip pain  M25.559    G89.29   4. Lumbar degenerative disc disease  M51.36         ASSESSMENT and PLAN:     Balbina Randall  1975 female      Balbina  was seen today for follow-up.    Diagnoses and all orders for this visit:    Lumbar spondylosis  Comments:  Not controlled, see below  Orders:  -     Referral to Physical Medicine Rehab; Future  -     Referral to Physical Medicine Rehab    Lumbar radiculopathy  Comments:  Stable after epidural steroid injection    Chronic hip pain  Comments:  Stable, continue home exercise program    Lumbar degenerative disc disease  Comments:  Stable, continue home exercise program        Status post diagnostic medial branch blocks x2 targeting the BILATERAL L3-4 and L4-5 facet joints with greater than 100% pain relief during the diagnostic phase.  This represents a positive diagnostic block.    I believe the patient is a good candidate for BILATERAL radiofrequency neurotomy targeting the medial branches innervating the L3-4 and L4-5 facet joints    The risks benefits and alternatives to this procedure were discussed and the patient wishes to proceed with the procedure. Risks include but are not limited to damage to surrounding structures, infection, bleeding, worsening of pain which can be permanent, weakness which can be permanent. Benefits include pain relief, improved function. Alternatives includes not doing the procedure.       Medications: Continue Zanaflex, gabapentin, Tylenol as needed      Follow up: After the above procedure    Thank you for allowing me to participate in the care of this patient. If you have any questions please not hesitate to contact me.             Please note that this dictation was created using voice recognition software. I have made every reasonable attempt to correct obvious errors but there may be errors of grammar and content that I may have overlooked prior to finalization of this note.      Davide Bartholomew MD  Interventional Spine and Sports Physiatry  Physical Medicine and Rehabilitation  [facility]

## 2022-09-12 NOTE — H&P (VIEW-ONLY)
Follow up patient note  Interventional spine and Pain  Physiatry (physical medicine and  Rehabilitation)       Chief complaint:   Chief Complaint   Patient presents with    Follow-Up     Back pain          HISTORY    Please see new patient note by Dr Bartholomew,  for more details.     HPI  Patient identification: Balbina Randall ,  1975,   With Diagnoses of Lumbar spondylosis, Lumbar radiculopathy, Chronic hip pain, and Lumbar degenerative disc disease were pertinent to this visit.       Procedure:  2022 left L3-4 and L4-5 transforaminal epidural steroid injection.  60% improved postprocedure   2022 medial branch block starting the bilateral L3-4 and L4-5 facet joints 100% pain relief during the diagnostic phase  2022 medial branch block starting the bilateral L3-4 and L4-5 facet joints 100% pain relief during the diagnostic phase    The patient had excellent results during the diagnostic phase of medial branch blocks as above.  The pain is returned back to its baseline after the medial branch block diagnostic.  The pain is moderate intensity bilateral axial aching quality nonradiating pain worse with lumbar extension.  Denies leg pain, denies numbness tingling weakness.  Pain is chronic and has been present for greater than 6 months.  She is done a comprehensive pain program including the past 4 months.          Medications tried include toradol, zanaflex, percocet, oral steroids, mobic.          ROS Red Flags :   Fever, Chills, Sweats: Denies  Involuntary Weight Loss: Denies  Bowel/Bladder Incontinence: Denies  Saddle Anesthesia: Denies        PMHx:   Past Medical History:   Diagnosis Date    Airway problem 1/19/15    abnormal airway assessment as noted on procedure orders per MD (Dr. Serrato)    Heart burn     Indigestion     Other specified symptom associated with female genital organs     ovarian cyst, adhesions    Harleen-Parkinson-White syndrome     WPW (Harleen-Parkinson-White syndrome)  since birth    cardiologist, Dr. So       PSHx:   Past Surgical History:   Procedure Laterality Date    LUMBAR MEDIAL BRANCH BLOCKS Bilateral 9/7/2022    Procedure: Diagnostic medial branch blocks targeting the BILATERAL L3-4 and L4-5 facet joints with fluoroscopic guidance #2;  Surgeon: Davide Bartholomew M.D.;  [facility];  Service: Pain Management    LUMBAR MEDIAL BRANCH BLOCKS Bilateral 8/31/2022    Procedure: Diagnostic media branch blocks targeting the BILATERAL L3-4 and L4-5 facet joints with fluoroscopic guidance;  Surgeon: Davide Bartholomew M.D.;  [facility];  Service: Pain Management    LUMBAR TRANSFORAMINAL EPIDURAL STEROID INJECTION Left 4/19/2022    Procedure: LEFT L3-4 and L4-5 transforaminal epidural steroid injection with fluoroscopic guidance;  Surgeon: Davide Bartholomew M.D.;  [facility];  Service: Pain Management    BLOCK EPIDURAL STEROID INJECTION Left 4/19/2022    Procedure: .;  Surgeon: Davide Bartholomew M.D.;  Location[facility];  Service: Pain Management    KY SHLDR ARTHROSCOP,PART ACROMIOPLAS Right 1/29/2020    Procedure: DECOMPRESSION, SHOULDER, ARTHROSCOPIC - SUBACROMIAL, EXTENSIVE DEBRIDEMENT;  Surgeon: Robert Park M.D.;  Location: [facility];  Service: Orthopedics    CLAVICLE DISTAL EXCISION Right 1/29/2020    Procedure: EXCISION, CLAVICLE, DISTAL;  Surgeon: Robert Park M.D.;  Location: [facility];  Service: Orthopedics    ROTATOR CUFF REPAIR Right 1/29/2020    Procedure: REPAIR, ROTATOR CUFF;  Surgeon: Robert Park M.D.;  Location: [facility];  Service: Orthopedics    HYSTEROSCOPY NOVASURE-2  4/27/2017    Procedure: HYSTEROSCOPY NOVASURE;  Surgeon: Lindsay Webb M.D.;  Location: SURGERY SAME DAY [facility];  Service:     WRIST ARTHROSCOPY Right 1/13/2016    Procedure: WRIST ARTHROSCOPY with debridment  ;  Surgeon: Jose Daniel YOUNG  "FELICITAS Redmond;  Location: SURGERY [facility];  Service:     LIGAMENT REPAIR  1/13/2016    Procedure: LIGAMENT REPAIR;  Surgeon: Jose Daniel Redmond M.D.;  Location: SURGERY [facility];  Service:     COLONOSCOPY WITH BIOPSY  1/26/2015    Performed by Mauricio Serrato M.D. at SURGERY [facility]    GASTROSCOPY WITH BIOPSY  1/26/2015    Performed by Mauricio Serrato M.D. at [facility]    HYSTEROSCOPY WITH VIDEO DIAGNOSTIC  5/24/2013    Performed by Robert Rosario M.D. at [facility]    KIRT BY LAPAROSCOPY  9/2/2010    Performed by KIM ZHAO at [facility]    PRIMARY C SECTION  4/30/2010    Performed by DAXA MILLAN at [facility]    BOWEL RESECTION  1992    \"adhesions\"    APPENDECTOMY  1992    LAPAROSCOPY  1992, 2002    OTHER      kenalog inj left foot       Family history   Family History   Problem Relation Age of Onset    Hypertension Other     Cancer Other          Medications:   Outpatient Medications Marked as Taking for the 9/12/22 encounter (Office Visit) with Davide Bartholomew M.D.   Medication Sig Dispense Refill    tizanidine (ZANAFLEX) 4 MG Tab Take 1 Tablet by mouth at bedtime as needed (pain). 30 Tablet 5    gabapentin (NEURONTIN) 300 MG Cap Take 1-2 Capsules by mouth 3 times a day as needed (pain). 180 Capsule 5        Current Outpatient Medications on File Prior to Visit   Medication Sig Dispense Refill    tizanidine (ZANAFLEX) 4 MG Tab Take 1 Tablet by mouth at bedtime as needed (pain). 30 Tablet 5    gabapentin (NEURONTIN) 300 MG Cap Take 1-2 Capsules by mouth 3 times a day as needed (pain). 180 Capsule 5    estradiol (VAGIFEM) 10 MCG Tab insert one tablet vaginally two times a week      acetaminophen (TYLENOL) 500 MG Tab Take 2 Tablets by mouth 3 times a day as needed (pain.  Do not exceed 3000 mg of total acetaminophen per day). 180 Tablet 6    sucralfate (CARAFATE) 1 GM/10ML Suspension       pantoprazole (PROTONIX) 40 " "MG Tablet Delayed Response        No current facility-administered medications on file prior to visit.         Allergies:   Allergies   Allergen Reactions    Latex Rash     HPQ=4410    Other Drug Rash     Allergic to Estrogen IM - preservative within medication  YPH=8969    Sulfa Drugs Rash and Itching     KOT=9880       Social Hx:   Social History     Socioeconomic History    Marital status:      Spouse name: Not on file    Number of children: Not on file    Years of education: Not on file    Highest education level: Not on file   Occupational History    Not on file   Tobacco Use    Smoking status: Never    Smokeless tobacco: Never   Vaping Use    Vaping Use: Never used   Substance and Sexual Activity    Alcohol use: Yes     Alcohol/week: 0.0 oz     Comment: 3-4 month    Drug use: No    Sexual activity: Not on file   Other Topics Concern    Not on file   Social History Narrative    Not on file     Social Determinants of Health     Financial Resource Strain: Not on file   Food Insecurity: Not on file   Transportation Needs: Not on file   Physical Activity: Not on file   Stress: Not on file   Social Connections: Not on file   Intimate Partner Violence: Not on file   Housing Stability: Not on file         EXAMINATION     Physical Exam:   Vitals: /64 (BP Location: Right arm, Patient Position: Sitting, BP Cuff Size: Adult)   Pulse 61   Temp 36.2 °C (97.1 °F) (Temporal)   Ht 1.676 m (5' 6\")   Wt 118 kg (259 lb 14.8 oz)   SpO2 97%     Constitutional:   Body Habitus: Body mass index is 41.95 kg/m².  Cooperation: Fully cooperates with exam  Appearance: Well-groomed no disheveled    Respiratory-  breathing comfortable on room air, no audible wheezing  Cardiovascular- capillary refills less than 2 seconds. No lower extremity edema is noted.   Psychiatric- alert and oriented ×3. Normal affect.    MSK and Neuro: -      Thoracic/Lumbar Spine/Sacral Spine/Hips   There are no signs of infection around the " injection sites.   decreased active range of motion with flexion, lateral flexion, and rotation bilaterally.   There is decreased active range of motion with lumbar extension.    There is  pain with lumbar extension.   There is pain with facet loading maneuver (extension rotation) with axial low back pain on the BILATERAL side(s)    Palpation:   No tenderness to palpation in midline at T1-T12 levels. No tenderness to palpation in the left and right of the midline T1-L5, NEGATIVE for tenderness to palpation to the para-midline region in the lower lumbar levels.  palpation over SI joint: negative bilaterally    palpation in hip or over the gluteus medius tendon insertion: negative bilaterally        Key points for the international standards for neurological classification of spinal cord injury (ISNCSCI) to light touch.     Dermatome R L                                      L2 2 2   L3 2 2   L4 2 2   L5 2 2   S1 2 2   S2 2 2         Motor Exam Lower Extremities    ? Myotome R L   Hip flexion L2 5 5   Knee extension L3 5 5   Ankle dorsiflexion L4 5 5   Toe extension L5 5 5   Ankle plantarflexion S1 5 5             MEDICAL DECISION MAKING    DATA    Labs:   Lab Results   Component Value Date/Time    SODIUM 142 05/11/2021 05:10 PM    POTASSIUM 4.1 05/11/2021 05:10 PM    CHLORIDE 109 05/11/2021 05:10 PM    CO2 24 05/11/2021 05:10 PM    GLUCOSE 109 (H) 05/11/2021 05:10 PM    BUN 25 (H) 05/11/2021 05:10 PM    CREATININE 0.97 05/11/2021 05:10 PM        Lab Results   Component Value Date/Time    PROTHROMBTM 12.8 05/18/2012 08:57 AM    INR 0.95 05/18/2012 08:57 AM        Lab Results   Component Value Date/Time    WBC 8.7 05/11/2021 05:10 PM    RBC 4.60 05/11/2021 05:10 PM    HEMOGLOBIN 14.0 05/11/2021 05:10 PM    HEMATOCRIT 43.6 05/11/2021 05:10 PM    MCV 94.8 05/11/2021 05:10 PM    MCH 30.4 05/11/2021 05:10 PM    MCHC 32.1 (L) 05/11/2021 05:10 PM    MPV 12.3 05/11/2021 05:10 PM    NEUTSPOLYS 53.00 05/11/2021 05:10 PM     LYMPHOCYTES 33.90 05/11/2021 05:10 PM    MONOCYTES 7.90 05/11/2021 05:10 PM    EOSINOPHILS 4.00 05/11/2021 05:10 PM    BASOPHILS 0.70 05/11/2021 05:10 PM        No results found for: HBA1C       Imaging:   I personally reviewed following images    MRI lumbar spine 4/14/2022  There are no areas of high-grade neuroforaminal or central canal stenosis.  There is facet arthropathy worst on the left at L3-4, L4-5, L5-S1.  Degenerative disc disease at L4-5 and L5-S1.  High intensity zone's at the left paracentric region at L4-5 and L5-S1 which can be consistent with an annular tear.      MRI lumbar spine 7/27/2015  No areas of high-grade central stenosis.  Tiny disc protrusions at L4-5 and L5-S1.    I reviewed the following radiology reports    MRI left hip 7/27/2015  IMPRESSION:   1.  Gluteal tendinopathy and greater trochanteric bursitis.  2.  Ischia femoral impingement.   3.  3.9 cm left ovarian cyst.           Results for orders placed during the hospital encounter of 04/14/22    MR-LUMBAR SPINE-W/O    Impression  1.  Mild degenerative disc disease at L4-5 and L5-S1 as described. Central annular fissure posterior disc is unchanged at both levels.    2.  Facet arthropathy at L3-L4, left greater than right.                                                        Results for orders placed during the hospital encounter of 04/23/21    CT-CHEST (THORAX) WITH    Impression  1.  No evidence of focal consolidation or pleural effusion.    2.  No evidence of mediastinal or hilar adenopathy.    3.  Fatty liver.    4.  Prior cholecystectomy.            Results for orders placed during the hospital encounter of 05/02/18    CT-ABDOMEN WITH & W/O    Impression  Unremarkable CT scan of the abdomen with attention to the pancreas.    Status post cholecystectomy.       Results for orders placed during the hospital encounter of 07/01/16    CT-ABDOMEN-PELVIS WITH    Impression  Unremarkable contrast-enhanced CT of the abdomen and pelvis  though appendix not visualized.                   Results for orders placed during the hospital encounter of 10/03/14    DX-ANKLE 3+ VIEWS    Impression  1.  Soft tissue swelling with no acute fracture.  2.  Mild degenerative changes talonavicular joint and possible focal area of osteonecrosis articular surface talus.        Results for orders placed in visit on 21    DX-ELBOW-LIMITED 2- LEFT             Results for orders placed during the hospital encounter of 04/07/15    DX-HIP-COMPLETE - UNILATERAL 2+    Impression  Unremarkable examination of the left hip.   Results for orders placed during the hospital encounter of 09    DX-HIPS-COMPLETE - BILATERAL 3+    Impression  IMPRESSION:    NEGATIVE EXAMINATION OF BOTH HIPS.    SST:dxm      Read By MISHA FIGUEROA MD on 2009  4:37PM  : DXM Transcription Date: 2009  8:27PM  THIS DOCUMENT HAS BEEN ELECTRONICALLY SIGNED BY: MISHA FIGUEROA MD on  2009  2:34PM        Results for orders placed during the hospital encounter of 22    DX-LUMBAR SPINE-2 OR 3 VIEWS    Impression  1.  Minimal retrolisthesis of L1 on L2.  2.  Mild degenerative changes at L5/S1.  3.  Mild facet arthropathy in the lower lumbar spine.              Results for orders placed during the hospital encounter of 09    DX-THORACIC SPINE-WITH SWIMMERS VIEW    Impression  IMPRESSION:    1. MINIMAL ANTERIOR ENDPLATE OSTEOPHYTOSIS INFERIORLY.  OTHERWISE  NEGATIVE EXAMINATION OF THE THORACIC SPINE.    SST:dxm       Results for orders placed during the hospital encounter of 04/20/15    DX-WRIST-LIMITED 2-    Impression  No acute osseous abnormality is identified.        DIAGNOSIS   Visit Diagnoses     ICD-10-CM   1. Lumbar spondylosis  M47.816   2. Lumbar radiculopathy  M54.16   3. Chronic hip pain  M25.559    G89.29   4. Lumbar degenerative disc disease  M51.36         ASSESSMENT and PLAN:     Balbina Randall  1975 female      Balbina  was seen today for follow-up.    Diagnoses and all orders for this visit:    Lumbar spondylosis  Comments:  Not controlled, see below  Orders:  -     Referral to Physical Medicine Rehab; Future  -     Referral to Physical Medicine Rehab    Lumbar radiculopathy  Comments:  Stable after epidural steroid injection    Chronic hip pain  Comments:  Stable, continue home exercise program    Lumbar degenerative disc disease  Comments:  Stable, continue home exercise program        Status post diagnostic medial branch blocks x2 targeting the BILATERAL L3-4 and L4-5 facet joints with greater than 100% pain relief during the diagnostic phase.  This represents a positive diagnostic block.    I believe the patient is a good candidate for BILATERAL radiofrequency neurotomy targeting the medial branches innervating the L3-4 and L4-5 facet joints    The risks benefits and alternatives to this procedure were discussed and the patient wishes to proceed with the procedure. Risks include but are not limited to damage to surrounding structures, infection, bleeding, worsening of pain which can be permanent, weakness which can be permanent. Benefits include pain relief, improved function. Alternatives includes not doing the procedure.       Medications: Continue Zanaflex, gabapentin, Tylenol as needed      Follow up: After the above procedure    Thank you for allowing me to participate in the care of this patient. If you have any questions please not hesitate to contact me.             Please note that this dictation was created using voice recognition software. I have made every reasonable attempt to correct obvious errors but there may be errors of grammar and content that I may have overlooked prior to finalization of this note.      Davide Bartholomew MD  Interventional Spine and Sports Physiatry  Physical Medicine and Rehabilitation  [facility]

## 2022-09-28 ENCOUNTER — APPOINTMENT (OUTPATIENT)
Dept: RADIOLOGY | Facility: REHABILITATION | Age: 47
End: 2022-09-28
Attending: PHYSICAL MEDICINE & REHABILITATION
Payer: COMMERCIAL

## 2022-09-28 ENCOUNTER — HOSPITAL ENCOUNTER (OUTPATIENT)
Facility: REHABILITATION | Age: 47
End: 2022-09-28
Attending: PHYSICAL MEDICINE & REHABILITATION | Admitting: PHYSICAL MEDICINE & REHABILITATION
Payer: COMMERCIAL

## 2022-09-28 VITALS
TEMPERATURE: 96.9 F | OXYGEN SATURATION: 95 % | HEIGHT: 66 IN | WEIGHT: 258.16 LBS | BODY MASS INDEX: 41.49 KG/M2 | HEART RATE: 44 BPM | DIASTOLIC BLOOD PRESSURE: 75 MMHG | RESPIRATION RATE: 18 BRPM | SYSTOLIC BLOOD PRESSURE: 132 MMHG

## 2022-09-28 PROCEDURE — 99152 MOD SED SAME PHYS/QHP 5/>YRS: CPT

## 2022-09-28 PROCEDURE — 64636 DESTROY L/S FACET JNT ADDL: CPT

## 2022-09-28 PROCEDURE — 64635 DESTROY LUMB/SAC FACET JNT: CPT

## 2022-09-28 PROCEDURE — 700111 HCHG RX REV CODE 636 W/ 250 OVERRIDE (IP)

## 2022-09-28 RX ORDER — LIDOCAINE HYDROCHLORIDE 10 MG/ML
INJECTION, SOLUTION EPIDURAL; INFILTRATION; INTRACAUDAL; PERINEURAL
Status: COMPLETED
Start: 2022-09-28 | End: 2022-09-28

## 2022-09-28 RX ORDER — MIDAZOLAM HYDROCHLORIDE 1 MG/ML
INJECTION INTRAMUSCULAR; INTRAVENOUS
Status: COMPLETED
Start: 2022-09-28 | End: 2022-09-28

## 2022-09-28 RX ADMIN — MIDAZOLAM HYDROCHLORIDE 0.5 MG: 1 INJECTION, SOLUTION INTRAMUSCULAR; INTRAVENOUS at 09:40

## 2022-09-28 RX ADMIN — FENTANYL CITRATE 25 MCG: 50 INJECTION, SOLUTION INTRAMUSCULAR; INTRAVENOUS at 09:40

## 2022-09-28 RX ADMIN — LIDOCAINE HYDROCHLORIDE 30 ML: 10 INJECTION, SOLUTION EPIDURAL; INFILTRATION; INTRACAUDAL; PERINEURAL at 09:40

## 2022-09-28 RX ADMIN — FENTANYL CITRATE 25 MCG: 50 INJECTION, SOLUTION INTRAMUSCULAR; INTRAVENOUS at 09:36

## 2022-09-28 RX ADMIN — MIDAZOLAM HYDROCHLORIDE 0.5 MG: 1 INJECTION, SOLUTION INTRAMUSCULAR; INTRAVENOUS at 09:36

## 2022-09-28 ASSESSMENT — PAIN DESCRIPTION - PAIN TYPE: TYPE: CHRONIC PAIN

## 2022-09-28 ASSESSMENT — FIBROSIS 4 INDEX: FIB4 SCORE: 1.03

## 2022-09-28 NOTE — OP REPORT
Patient: Balbina Randall 47 y.o. female MRN: [id number]     Date of Service: 9/28/2022     Physician/s: Davide Bartholomew MD    Pre-operative Diagnosis: Lumbar spondylosis, facet arthropathy.      Post-operative Diagnosis: Lumbar spondylosis, facet arthropathy.     Procedure: Medial Branch Radiofrequency neurotomy targeting the left L3-4 and L4-5 facet joint(s) with sedation.     Description of procedure:    The patient was not treated for radiculopathy at this time    The risks, benefits, and alternatives of the procedure were reviewed and discussed with the patient.  Written informed consent was freely obtained. A pre-procedural time-out was conducted by the physician verifying patient’s identity, procedure to be performed, procedure site and side, and allergy verification. Appropriate equipment was determined to be in place for the procedure.     Moderation sedation was achieved with Versed (1mg) and Fentanyl (50mcg). Monitoring of the patients vital signs and respiratory status was provided by trained independent registered nurse during the entire course of the procedures and under my supervision and recoded in the patient’s medical record. The duration of sedation was over 10 minutes.    The patient's vital signs were carefully monitored before, throughout, and after the procedure.     In the fluoroscopy suite the patient was placed in a prone position, and a pillow was placed underneath the level of the umbilicus. The skin was prepped and draped in the usual sterile fashion. The fluoroscope was placed over the low back at the appropriate angles, and the targets for needle/probe placement were marked. A 25g needle was placed into each of the markings at three levels, and approx 1cc of 1% Lidocaine was injected subcutaneously into the epidermal and dermal layers. The needle was removed.     A 18 guage, 14.5 cm RFN cannula with a 10 mm active tip was then placed into the skin using fluoroscopic guidance and  advanced with an oblique view towards the intersection of the transverse process and superior articular process L3-4 facet joint where the L2 medial branch runs  levels on the left side, where the medial branch runs. The needle/probe tips were then verified by AP, oblique, and lateral views.     A 18 guage, 14.5 cm RFN cannula with a 10 mm active tip was then placed into the skin using fluoroscopic guidance and advanced with an oblique view towards the intersection of the transverse process and superior articular process L4-5 facet joint where the L3 medial branch runs levels on the left side, where the medial branch runs. The needle/probe tips were then verified by AP, oblique, and lateral views.     Then A 18 guage, 14.5 cm RFN cannula with a 10 mm active tip was then placed into the skin using fluoroscopic guidance and advanced with an oblique view towards the intersection of the transverse process and superior articular process L5-S1 facet joint where the L4 medial branch runs  levels on the left side, where the medial branch runs. The needle/probe tips were then verified by AP, oblique, and lateral views.         Motor stimulation is used as an extra precaution to ensure the needle tips are off the lumbar nerve roots prior to each lesion. Following negative aspiration, a syringe was prepared with 10 mL of 1% lidocaine.  2mL of this syringe was injected at each level.  The needles are not moved, but fluoroscope guidance is used to ensure the needles have not moved. After a wait period of approximately 2 minutes, a radiofrequency lesion was then created at each level with a temperature of 80 degrees centigrade for 90 seconds.     The probes were adjusted to a 2nd location and images were saved in 2+ views views. Motor testing was done which confirmed no twitching in the leg.  And another radiofrequency lesion was made of 80 °C for 90 seconds. A 2nd radiofrequency lesion was made with 80°C for 90 seconds.      The  cannulas were restyletted, and were then removed intact.     Fluoroscopic images in AP and lateral view were saved prior to each radiofrequency neurotomy.    This was a challenging procedure given the patients Body mass index is 41.67 kg/m².. This required longer needles.  A typical procedure such as this would require 10cm probes.  This procedure required 14.5cm probes.  This added to the mental effort for complexity this procedure.  This also made acquiring fluoroscopic images more time-consuming and challenging.  Final fluoroscopic images were obtained and saved.     The patient's back was covered with a 4x4 gauze, the area was cleansed with sterile normal saline, and a dressing was applied. There were no complications noted, the patient remained hemodynamically stable, and the patient tolerated the procedure well. The patient was examined in the postoperative area and her strength exam was identical as prior to the procedure.    Follow-up as scheduled    Davide Bartholomew MD  Interventional Spine and Pain  Physical Medicine and Rehabilitation  [facility]            CPT  Radiofrequency ablation (RFA) - lumbar or sacral (1st joint):  45259-86  Radiofrequency ablation (RFA) - lumbar or sacral (each additional joint):  48620-93   moderate procedural sedation first 15 minutes: 22612

## 2022-09-28 NOTE — PROGRESS NOTES
0819 Pt arrived to pre-procedure area. Procedure & plan for recovery reviewed, site confirmed, & consent signed. Allergies & current medications verified. Appointed  waiting in car. Printed discharge instructions discussed & signed. Pt reports mild rash with bandage used on past procedure, procedural RN notified. Pt denies taking NSAIDS or anticoagulants in the last 5 days. MD to bedside prior to procedure.     7126 Pt to recovery area s/p RFN & updates received from procedure RN. VSS. Pt reports increasing pain intraprocedure. Ice pack given for home care. Dr. Bartholomew to bedside for post-procedure evaluation.      1025 Pt ambulates without difficulty & meets criteria for DC. PIV removed. RN assisted pt off unit to appointed .

## 2022-09-28 NOTE — INTERVAL H&P NOTE
H&P reviewed. The patient was examined and there are no changes to the H&P     Lungs clear to auscultation bilaterally.  No abdominal tenderness.  Heart regular rate and rhythm.  Vitals reviewed.    Proceed with the originally scheduled procedure.  The risks, benefits and alternatives were discussed.  The patient understands these.    Pre-Op Diagnosis Codes:     * Lumbar spondylosis [M47.816]  Procedure(s):  LEFT radiofrequency neurotomies medial branch targeting the L3-4 and L4-5 facet joints with fluorsoscopic guidance and sedation    Davide Bartholomew MD  Physical Medicine and Rehabilitation  Interventional Spine and Sports Physiatry  [facility]    p

## 2022-09-29 ENCOUNTER — TELEPHONE (OUTPATIENT)
Dept: PHYSICAL MEDICINE AND REHAB | Facility: MEDICAL CENTER | Age: 47
End: 2022-09-29
Payer: COMMERCIAL

## 2022-10-12 ENCOUNTER — HOSPITAL ENCOUNTER (OUTPATIENT)
Facility: REHABILITATION | Age: 47
End: 2022-10-12
Attending: PHYSICAL MEDICINE & REHABILITATION | Admitting: PHYSICAL MEDICINE & REHABILITATION
Payer: COMMERCIAL

## 2022-10-12 ENCOUNTER — APPOINTMENT (OUTPATIENT)
Dept: RADIOLOGY | Facility: REHABILITATION | Age: 47
End: 2022-10-12
Attending: PHYSICAL MEDICINE & REHABILITATION
Payer: COMMERCIAL

## 2022-10-12 VITALS
DIASTOLIC BLOOD PRESSURE: 89 MMHG | TEMPERATURE: 97.8 F | HEIGHT: 66 IN | SYSTOLIC BLOOD PRESSURE: 124 MMHG | WEIGHT: 257.94 LBS | BODY MASS INDEX: 41.45 KG/M2 | RESPIRATION RATE: 15 BRPM | HEART RATE: 55 BPM | OXYGEN SATURATION: 95 %

## 2022-10-12 PROCEDURE — 99152 MOD SED SAME PHYS/QHP 5/>YRS: CPT

## 2022-10-12 PROCEDURE — 64635 DESTROY LUMB/SAC FACET JNT: CPT

## 2022-10-12 PROCEDURE — 700111 HCHG RX REV CODE 636 W/ 250 OVERRIDE (IP)

## 2022-10-12 PROCEDURE — 64636 DESTROY L/S FACET JNT ADDL: CPT

## 2022-10-12 RX ORDER — LIDOCAINE HYDROCHLORIDE 10 MG/ML
INJECTION, SOLUTION EPIDURAL; INFILTRATION; INTRACAUDAL; PERINEURAL
Status: COMPLETED
Start: 2022-10-12 | End: 2022-10-12

## 2022-10-12 RX ORDER — MIDAZOLAM HYDROCHLORIDE 1 MG/ML
INJECTION INTRAMUSCULAR; INTRAVENOUS
Status: COMPLETED
Start: 2022-10-12 | End: 2022-10-12

## 2022-10-12 RX ADMIN — FENTANYL CITRATE 50 MCG: 50 INJECTION, SOLUTION INTRAMUSCULAR; INTRAVENOUS at 07:58

## 2022-10-12 RX ADMIN — MIDAZOLAM HYDROCHLORIDE 1 MG: 1 INJECTION, SOLUTION INTRAMUSCULAR; INTRAVENOUS at 07:58

## 2022-10-12 RX ADMIN — LIDOCAINE HYDROCHLORIDE 30 ML: 10 INJECTION, SOLUTION EPIDURAL; INFILTRATION; INTRACAUDAL; PERINEURAL at 08:04

## 2022-10-12 ASSESSMENT — PAIN DESCRIPTION - PAIN TYPE: TYPE: CHRONIC PAIN

## 2022-10-12 ASSESSMENT — FIBROSIS 4 INDEX: FIB4 SCORE: 1.03

## 2022-10-12 NOTE — PROGRESS NOTES
0714 Pt admitted to pre-procedure area.  Procedure and site confirmed, consent signed.  VSS.  Medication allergies and current medications reviewed in Epic.  Designated  waiting in the car.  Printed discharge instructions reviewed and signed.  Pertinent medical history reviewed in Epic and communicated to procedure RN.  Pt denies taking any NSAIDS/blood-thinners/anti-coagulants in the last 5 days.

## 2022-10-12 NOTE — PROGRESS NOTES
Pt received to recovery area with report from procedure room RN Pita.  CONSTANTINO.  Ice compress applied to the affected area.  No swelling noted, dressing CDI.  Pt tolerates PO fluids and snack without difficulty.  Dr. Bartholomew evaluated patient.  Meets criteria for discharge.  Pt ambulatory without difficulty.  Discharged to designated .

## 2022-10-12 NOTE — OP REPORT
Patient: Balbina Randall 47 y.o. female MRN: [id number]     Date of Service: 10/12/2022     Physician/s: Davide Bartholomew MD    Pre-operative Diagnosis: Lumbar spondylosis, facet arthropathy.      Post-operative Diagnosis: Lumbar spondylosis, facet arthropathy.     Procedure: Medial Branch Radiofrequency neurotomy targeting the right L3-4 and L4-5 facet joint(s) with sedation.     Description of procedure:    The patient was not treated for radiculopathy at this time    The risks, benefits, and alternatives of the procedure were reviewed and discussed with the patient.  Written informed consent was freely obtained. A pre-procedural time-out was conducted by the physician verifying patient’s identity, procedure to be performed, procedure site and side, and allergy verification. Appropriate equipment was determined to be in place for the procedure.     Moderation sedation was achieved with Versed (1mg) and Fentanyl (50mcg). Monitoring of the patients vital signs and respiratory status was provided by trained independent registered nurse during the entire course of the procedures and under my supervision and recoded in the patient’s medical record. The duration of sedation was over 10 minutes.     The patient's vital signs were carefully monitored before, throughout, and after the procedure.     In the fluoroscopy suite the patient was placed in a prone position, and a pillow was placed underneath the level of the umbilicus. The skin was prepped and draped in the usual sterile fashion. The fluoroscope was placed over the low back at the appropriate angles, and the targets for needle/probe placement were marked. A 25g needle was placed into each of the markings at three levels, and approx 1cc of 1% Lidocaine was injected subcutaneously into the epidermal and dermal layers. The needle was removed.     A 18 guage, 14.5 cm RFN cannula with a 10 mm active tip was then placed into the skin using fluoroscopic guidance and  advanced with an oblique view towards the intersection of the transverse process and superior articular process L3-4 facet joint where the L2 medial branch runs  levels on the right side, where the medial branch runs. The needle/probe tips were then verified by AP, oblique, and lateral views.     A 18 guage, 14.5 cm RFN cannula with a 10 mm active tip was then placed into the skin using fluoroscopic guidance and advanced with an oblique view towards the intersection of the transverse process and superior articular process L4-5 facet joint where the L3 medial branch runs levels on the right side, where the medial branch runs. The needle/probe tips were then verified by AP, oblique, and lateral views.     Then A 18 guage, 14.5 cm RFN cannula with a 10 mm active tip was then placed into the skin using fluoroscopic guidance and advanced with an oblique view towards the intersection of the transverse process and superior articular process L5-S1 facet joint where the L4 medial branch runs  levels on the right side, where the medial branch runs. The needle/probe tips were then verified by AP, oblique, and lateral views.     Motor stimulation is used as an extra precaution to ensure the needle tips are off the lumbar nerve roots prior to each lesion. Following negative aspiration, a syringe was prepared with 10 mL of 1% lidocaine.  2mL of this syringe was injected at each level.  The needles are not moved, but fluoroscope guidance is used to ensure the needles have not moved. After a wait period of approximately 2 minutes, a radiofrequency lesion was then created at each level with a temperature of 80 degrees centigrade for 90 seconds.     The probes were adjusted to a 2nd location and images were saved in 2+ views views. Motor testing was done which confirmed no twitching in the leg.  And another radiofrequency lesion was made of 80 °C for 90 seconds. A 2nd radiofrequency lesion was made with 80°C for 90 seconds.      The  cannulas were restyletted, and were then removed intact.     Fluoroscopic images in AP and lateral view were saved prior to each radiofrequency neurotomy.    This was a challenging procedure given the patients Body mass index is 41.63 kg/m².. This required longer needles.  A typical procedure such as this would require 10cm probes.  This procedure required 14.5cm probes.  This added to the mental effort for complexity this procedure.  This also made acquiring fluoroscopic images more time-consuming and challenging.  Final fluoroscopic images were obtained and saved.     The patient's back was covered with a 4x4 gauze, the area was cleansed with sterile normal saline, and a dressing was applied. There were no complications noted, the patient remained hemodynamically stable, and the patient tolerated the procedure well. The patient was examined in the postoperative area and her strength exam was identical as prior to the procedure.    Follow-up as scheduled    Davide Bartholomew MD  Interventional Spine and Pain  Physical Medicine and Rehabilitation  [facility]            CPT  Radiofrequency ablation (RFA) - lumbar or sacral (1st joint):  69587-36  Radiofrequency ablation (RFA) - lumbar or sacral (each additional joint):  64636-22 x 2   moderate procedural sedation first 15 minutes: 41730

## 2022-10-12 NOTE — INTERVAL H&P NOTE
H&P reviewed. The patient was examined and there are no changes to the H&P     Lungs clear to auscultation bilaterally.  No abdominal tenderness.  Heart regular rate and rhythm.  Vitals reviewed.    Proceed with the originally scheduled procedure.  The risks, benefits and alternatives were discussed.  The patient understands these.    Pre-Op Diagnosis Codes:     * Lumbar spondylosis [M47.816]  Procedure(s):  RIGHT radiofrequency neurotomies medial branch targeting rhe L3-4 and L4-5 facet joints with fluoroscopic guidance and sedation    Davide Bartholomew MD  Physical Medicine and Rehabilitation  Interventional Spine and Sports Physiatry  [facility]

## 2022-10-13 ENCOUNTER — TELEPHONE (OUTPATIENT)
Dept: PHYSICAL MEDICINE AND REHAB | Facility: MEDICAL CENTER | Age: 47
End: 2022-10-13
Payer: COMMERCIAL

## 2022-10-13 NOTE — TELEPHONE ENCOUNTER
Called for post-sp check-up. Pt reported the following regarding the procedure site:    Change in pain?: felt better    Concerns?: Sore    Confirmed [facility] appt?: Yes

## 2022-11-04 ENCOUNTER — HOSPITAL ENCOUNTER (OUTPATIENT)
Dept: RADIOLOGY | Facility: MEDICAL CENTER | Age: 47
End: 2022-11-04
Attending: PHYSICIAN ASSISTANT
Payer: COMMERCIAL

## 2022-11-04 DIAGNOSIS — K29.70 GASTRITIS WITHOUT BLEEDING, UNSPECIFIED CHRONICITY, UNSPECIFIED GASTRITIS TYPE: ICD-10-CM

## 2022-11-04 DIAGNOSIS — K58.9 IRRITABLE BOWEL SYNDROME, UNSPECIFIED TYPE: ICD-10-CM

## 2022-11-04 DIAGNOSIS — K21.00 GASTROESOPHAGEAL REFLUX DISEASE WITH ESOPHAGITIS, UNSPECIFIED WHETHER HEMORRHAGE: ICD-10-CM

## 2022-11-04 DIAGNOSIS — Z86.010 HX OF COLONIC POLYPS: ICD-10-CM

## 2022-11-04 PROCEDURE — 74220 X-RAY XM ESOPHAGUS 1CNTRST: CPT

## 2022-11-21 ENCOUNTER — OFFICE VISIT (OUTPATIENT)
Dept: PHYSICAL MEDICINE AND REHAB | Facility: MEDICAL CENTER | Age: 47
End: 2022-11-21
Payer: COMMERCIAL

## 2022-11-21 VITALS
HEIGHT: 66 IN | BODY MASS INDEX: 42.23 KG/M2 | SYSTOLIC BLOOD PRESSURE: 128 MMHG | WEIGHT: 262.79 LBS | OXYGEN SATURATION: 95 % | TEMPERATURE: 97.7 F | HEART RATE: 57 BPM | DIASTOLIC BLOOD PRESSURE: 88 MMHG

## 2022-11-21 DIAGNOSIS — M54.16 LUMBAR RADICULOPATHY: ICD-10-CM

## 2022-11-21 DIAGNOSIS — G89.29 CHRONIC HIP PAIN, UNSPECIFIED LATERALITY: ICD-10-CM

## 2022-11-21 DIAGNOSIS — M47.816 LUMBAR SPONDYLOSIS: ICD-10-CM

## 2022-11-21 DIAGNOSIS — Z74.09 IMPAIRED MOBILITY AND ADLS: ICD-10-CM

## 2022-11-21 DIAGNOSIS — Z78.9 IMPAIRED MOBILITY AND ADLS: ICD-10-CM

## 2022-11-21 DIAGNOSIS — M25.559 CHRONIC HIP PAIN, UNSPECIFIED LATERALITY: ICD-10-CM

## 2022-11-21 PROCEDURE — 99214 OFFICE O/P EST MOD 30 MIN: CPT | Performed by: PHYSICAL MEDICINE & REHABILITATION

## 2022-11-21 RX ORDER — GABAPENTIN 300 MG/1
300-600 CAPSULE ORAL 3 TIMES DAILY PRN
Qty: 180 CAPSULE | Refills: 5 | Status: SHIPPED | OUTPATIENT
Start: 2022-11-21 | End: 2023-11-20 | Stop reason: SDUPTHER

## 2022-11-21 ASSESSMENT — PAIN SCALES - GENERAL: PAINLEVEL: 2=MINIMAL-SLIGHT

## 2022-11-21 ASSESSMENT — PATIENT HEALTH QUESTIONNAIRE - PHQ9: CLINICAL INTERPRETATION OF PHQ2 SCORE: 0

## 2022-11-21 ASSESSMENT — FIBROSIS 4 INDEX: FIB4 SCORE: 1.03

## 2022-11-21 NOTE — PROGRESS NOTES
Follow up patient note  Interventional spine and Pain  Physiatry (physical medicine and  Rehabilitation)       Chief complaint:   Chief Complaint   Patient presents with    Follow-Up     Lumbar spondylosis          HISTORY    Please see new patient note by Dr Bartholomew,  for more details.     HPI  Patient identification: Balbina Randall ,  1975,   With Diagnoses of Lumbar spondylosis, Lumbar radiculopathy, Chronic hip pain, and Impaired mobility and ADLs were pertinent to this visit.       Procedure:  2022 left L3-4 and L4-5 transforaminal epidural steroid injection.  60% improved postprocedure   2022 medial branch block starting the bilateral L3-4 and L4-5 facet joints 100% pain relief during the diagnostic phase  2022 medial branch block starting the bilateral L3-4 and L4-5 facet joints 100% pain relief during the diagnostic phase  2022 medial branch radiofrequency neurotomy targeting the left L3-4, L4-5, L5-S1 facet joint.  80% pain relief  10/12/2022 medial branch radiofrequency neurotomy targeting the right L3-4, L4-5, L5-S1 facet joint.  80% pain relief      Has had a significant improvement in pain and function after the radiofrequency neurotomy.  Initially there was soreness and worsening of pain however this has improved significantly especially over the past month.  Pain is aching in quality intermittent 2 out of 10 in intensity nonradiating.  The left is typically worse than the right.  Standing and walking tolerance is improved.  She still has difficulty with prolonged sitting and notes that she has to shift frequently with prolonged sitting. With sitting the pain is 3-4/10 in intensity.     She has a home exercise program from physical therapy as well as a comprehensive pain program.        Medications tried include toradol, zanaflex, percocet, oral steroids, mobic.          ROS Red Flags :   Fever, Chills, Sweats: Denies  Involuntary Weight Loss: Denies  Bowel/Bladder  Incontinence: Denies  Saddle Anesthesia: Denies        PMHx:   Past Medical History:   Diagnosis Date    Airway problem 1/19/15    abnormal airway assessment as noted on procedure orders per MD (Dr. Serrato)    Heart burn     Indigestion     Other specified symptom associated with female genital organs     ovarian cyst, adhesions    Harleen-Parkinson-White syndrome     WPW (Harleen-Parkinson-White syndrome) since birth    cardiologist, Dr. So       PSHx:   Past Surgical History:   Procedure Laterality Date    DESTRUCTION, NERVE, USING NEUROLYTIC AGENT Right 10/12/2022    Procedure: RIGHT radiofrequency neurotomies medial branch targeting rhe L3-4 and L4-5 facet joints with fluoroscopic guidance and sedation;  Surgeon: Davide Bartholomew M.D.;  [facility];  Service: Pain Management    RADIO FREQUENCY ABLATION ADDITIONAL LEVEL Left 9/28/2022    Procedure: LEFT radiofrequency neurotomies medial branch targeting the L3-4 and L4-5 facet joints with fluorsoscopic guidance and sedation;  Surgeon: Davide Bartholomew M.D.;  Location: [facility];  Service: Pain Management    LUMBAR MEDIAL BRANCH BLOCKS Bilateral 9/7/2022    Procedure: Diagnostic medial branch blocks targeting the BILATERAL L3-4 and L4-5 facet joints with fluoroscopic guidance #2;  Surgeon: Davide Bartholomew M.D.;  Location: [facility];  Service: Pain Management    LUMBAR MEDIAL BRANCH BLOCKS Bilateral 8/31/2022    Procedure: Diagnostic media branch blocks targeting the BILATERAL L3-4 and L4-5 facet joints with fluoroscopic guidance;  Surgeon: Davide Bartholomew M.D.;  Location: [facility];  Service: Pain Management    LUMBAR TRANSFORAMINAL EPIDURAL STEROID INJECTION Left 4/19/2022    Procedure: LEFT L3-4 and L4-5 transforaminal epidural steroid injection with fluoroscopic guidance;  Surgeon: Davide Bartholomew M.D.;  Location: [facility];  Service: Pain  "Management    BLOCK EPIDURAL STEROID INJECTION Left 4/19/2022    Procedure: .;  Surgeon: Davide Bartholomew M.D.;  Location: [facility];  Service: Pain Management    UT SHLDR ARTHROSCOP,PART ACROMIOPLAS Right 1/29/2020    Procedure: DECOMPRESSION, SHOULDER, ARTHROSCOPIC - SUBACROMIAL, EXTENSIVE DEBRIDEMENT;  Surgeon: Robert Park M.D.;  Location: SURGERY [facility];  Service: Orthopedics    CLAVICLE DISTAL EXCISION Right 1/29/2020    Procedure: EXCISION, CLAVICLE, DISTAL;  Surgeon: Robert Park M.D.;  Location: [facility];  Service: Orthopedics    ROTATOR CUFF REPAIR Right 1/29/2020    Procedure: REPAIR, ROTATOR CUFF;  Surgeon: Robert Park M.D.;  Location: [facility];  Service: Orthopedics    HYSTEROSCOPY NOVASURE-2  4/27/2017    Procedure: HYSTEROSCOPY NOVASURE;  Surgeon: Daxa Webb M.D.;  Location: SURGERY SAME DAY [facility];  Service:     WRIST ARTHROSCOPY Right 1/13/2016    Procedure: WRIST ARTHROSCOPY with debridment  ;  Surgeon: Jose Daniel Redmond M.D.;  Location: [facility];  Service:     LIGAMENT REPAIR  1/13/2016    Procedure: LIGAMENT REPAIR;  Surgeon: Jose Daniel Redmond M.D.;  Location: SURGERY [facility];  Service:     COLONOSCOPY WITH BIOPSY  1/26/2015    Performed by Mauricio Serrato M.D. at [facility]    GASTROSCOPY WITH BIOPSY  1/26/2015    Performed by Mauricio Serrato M.D. at [facility]    HYSTEROSCOPY WITH VIDEO DIAGNOSTIC  5/24/2013    Performed by Robert Rosario M.D. at [facility]    KIRT BY LAPAROSCOPY  9/2/2010    Performed by KIM ZHAO at [facility]    PRIMARY C SECTION  4/30/2010    Performed by DAXA WEBB at [facility]    BOWEL RESECTION  1992    \"adhesions\"    APPENDECTOMY  1992    LAPAROSCOPY  1992, 2002    OTHER      kenalog inj left foot       Family history   Family History   Problem Relation Age of Onset    " Hypertension Other     Cancer Other          Medications:   Outpatient Medications Marked as Taking for the 11/21/22 encounter (Office Visit) with Davide Bartholomew M.D.   Medication Sig Dispense Refill    gabapentin (NEURONTIN) 300 MG Cap Take 1-2 Capsules by mouth 3 times a day as needed (pain). 180 Capsule 5    tizanidine (ZANAFLEX) 4 MG Tab Take 1 Tablet by mouth at bedtime as needed (pain). 30 Tablet 5    estradiol (VAGIFEM) 10 MCG Tab insert one tablet vaginally two times a week      acetaminophen (TYLENOL) 500 MG Tab Take 2 Tablets by mouth 3 times a day as needed (pain.  Do not exceed 3000 mg of total acetaminophen per day). 180 Tablet 6    sucralfate (CARAFATE) 1 GM/10ML Suspension       pantoprazole (PROTONIX) 40 MG Tablet Delayed Response           Current Outpatient Medications on File Prior to Visit   Medication Sig Dispense Refill    tizanidine (ZANAFLEX) 4 MG Tab Take 1 Tablet by mouth at bedtime as needed (pain). 30 Tablet 5    estradiol (VAGIFEM) 10 MCG Tab insert one tablet vaginally two times a week      acetaminophen (TYLENOL) 500 MG Tab Take 2 Tablets by mouth 3 times a day as needed (pain.  Do not exceed 3000 mg of total acetaminophen per day). 180 Tablet 6    sucralfate (CARAFATE) 1 GM/10ML Suspension       pantoprazole (PROTONIX) 40 MG Tablet Delayed Response        No current facility-administered medications on file prior to visit.         Allergies:   Allergies   Allergen Reactions    Latex Rash     RAX=3694    Other Drug Rash     Allergic to Estrogen IM - preservative within medication  BHT=4340    Sulfa Drugs Rash and Itching     WAH=9629    Wound Dressing Adhesive Hives     Band-aids cause rash and itching       Social Hx:   Social History     Socioeconomic History    Marital status:      Spouse name: Not on file    Number of children: Not on file    Years of education: Not on file    Highest education level: Not on file   Occupational History    Not on file   Tobacco Use     "Smoking status: Never    Smokeless tobacco: Never   Vaping Use    Vaping Use: Never used   Substance and Sexual Activity    Alcohol use: Yes     Alcohol/week: 0.0 oz     Comment: 3-4 month    Drug use: No    Sexual activity: Not on file   Other Topics Concern    Not on file   Social History Narrative    Not on file     Social Determinants of Health     Financial Resource Strain: Not on file   Food Insecurity: Not on file   Transportation Needs: Not on file   Physical Activity: Not on file   Stress: Not on file   Social Connections: Not on file   Intimate Partner Violence: Not on file   Housing Stability: Not on file         EXAMINATION     Physical Exam:   Vitals: /88 (BP Location: Right arm, Patient Position: Sitting, BP Cuff Size: Adult)   Pulse (!) 57   Temp 36.5 °C (97.7 °F) (Temporal)   Ht 1.676 m (5' 6\")   Wt 119 kg (262 lb 12.6 oz)   SpO2 95%     Constitutional:   Body Habitus: Body mass index is 42.42 kg/m².  Cooperation: Fully cooperates with exam  Appearance: Well-groomed no disheveled    Respiratory-  breathing comfortable on room air, no audible wheezing  Cardiovascular- capillary refills less than 2 seconds. No lower extremity edema is noted.   Psychiatric- alert and oriented ×3. Normal affect.    MSK and Neuro: -      Thoracic/Lumbar Spine/Sacral Spine/Hips   There are no signs of infection around the injection sites.   full  active range of motion with flexion, lateral flexion, and rotation bilaterally.   There is full  active range of motion with lumbar extension.    There is no  pain with lumbar extension.   There is no pain with facet loading maneuver (extension rotation) with axial low back pain on the BILATERAL side(s)       Palpation:   No tenderness to palpation in midline at T1-T12 levels. No tenderness to palpation in the left and right of the midline T1-L5, NEGATIVE for tenderness to palpation to the para-midline region in the lower lumbar levels.  palpation over SI joint: " negative bilaterally    palpation in hip or over the gluteus medius tendon insertion: negative bilaterally      Lumbar spine Special tests  Neuro tension  Straight leg test negative bilaterally    Slump test negative bilaterally      Key points for the international standards for neurological classification of spinal cord injury (ISNCSCI) to light touch.     Dermatome R L                                      L2 2 2   L3 2 2   L4 2 2   L5 2 2   S1 2 2   S2 2 2         Motor Exam Lower Extremities    ? Myotome R L   Hip flexion L2 5 5   Knee extension L3 5 5   Ankle dorsiflexion L4 5 5   Toe extension L5 5 5   Ankle plantarflexion S1 5 5           MEDICAL DECISION MAKING    DATA    Labs:   Lab Results   Component Value Date/Time    SODIUM 142 05/11/2021 05:10 PM    POTASSIUM 4.1 05/11/2021 05:10 PM    CHLORIDE 109 05/11/2021 05:10 PM    CO2 24 05/11/2021 05:10 PM    GLUCOSE 109 (H) 05/11/2021 05:10 PM    BUN 25 (H) 05/11/2021 05:10 PM    CREATININE 0.97 05/11/2021 05:10 PM        Lab Results   Component Value Date/Time    PROTHROMBTM 12.8 05/18/2012 08:57 AM    INR 0.95 05/18/2012 08:57 AM        Lab Results   Component Value Date/Time    WBC 8.7 05/11/2021 05:10 PM    RBC 4.60 05/11/2021 05:10 PM    HEMOGLOBIN 14.0 05/11/2021 05:10 PM    HEMATOCRIT 43.6 05/11/2021 05:10 PM    MCV 94.8 05/11/2021 05:10 PM    MCH 30.4 05/11/2021 05:10 PM    MCHC 32.1 (L) 05/11/2021 05:10 PM    MPV 12.3 05/11/2021 05:10 PM    NEUTSPOLYS 53.00 05/11/2021 05:10 PM    LYMPHOCYTES 33.90 05/11/2021 05:10 PM    MONOCYTES 7.90 05/11/2021 05:10 PM    EOSINOPHILS 4.00 05/11/2021 05:10 PM    BASOPHILS 0.70 05/11/2021 05:10 PM        No results found for: HBA1C       Imaging:   I personally reviewed following images    MRI lumbar spine 4/14/2022  There are no areas of high-grade neuroforaminal or central canal stenosis.  There is facet arthropathy worst on the left at L3-4, L4-5, L5-S1.  Degenerative disc disease at L4-5 and L5-S1.  High intensity  zone's at the left paracentric region at L4-5 and L5-S1 which can be consistent with an annular tear.      MRI lumbar spine 7/27/2015  No areas of high-grade central stenosis.  Tiny disc protrusions at L4-5 and L5-S1.    I reviewed the following radiology reports    MRI left hip 7/27/2015  IMPRESSION:   1.  Gluteal tendinopathy and greater trochanteric bursitis.  2.  Ischia femoral impingement.   3.  3.9 cm left ovarian cyst.           Results for orders placed during the hospital encounter of 04/14/22    MR-LUMBAR SPINE-W/O    Impression  1.  Mild degenerative disc disease at L4-5 and L5-S1 as described. Central annular fissure posterior disc is unchanged at both levels.    2.  Facet arthropathy at L3-L4, left greater than right.                                                        Results for orders placed during the hospital encounter of 04/23/21    CT-CHEST (THORAX) WITH    Impression  1.  No evidence of focal consolidation or pleural effusion.    2.  No evidence of mediastinal or hilar adenopathy.    3.  Fatty liver.    4.  Prior cholecystectomy.            Results for orders placed during the hospital encounter of 05/02/18    CT-ABDOMEN WITH & W/O    Impression  Unremarkable CT scan of the abdomen with attention to the pancreas.    Status post cholecystectomy.       Results for orders placed during the hospital encounter of 07/01/16    CT-ABDOMEN-PELVIS WITH    Impression  Unremarkable contrast-enhanced CT of the abdomen and pelvis though appendix not visualized.                   Results for orders placed during the hospital encounter of 10/03/14    DX-ANKLE 3+ VIEWS    Impression  1.  Soft tissue swelling with no acute fracture.  2.  Mild degenerative changes talonavicular joint and possible focal area of osteonecrosis articular surface talus.        Results for orders placed in visit on 11/08/21    DX-ELBOW-LIMITED 2- LEFT             Results for orders placed during the hospital encounter of  04/07/15    DX-HIP-COMPLETE - UNILATERAL 2+    Impression  Unremarkable examination of the left hip.   Results for orders placed during the hospital encounter of 09    DX-HIPS-COMPLETE - BILATERAL 3+    Impression  IMPRESSION:    NEGATIVE EXAMINATION OF BOTH HIPS.    SST:dxm      Read By MISHA FIGUEROA MD on 2009  4:37PM  : DXM Transcription Date: 2009  8:27PM  THIS DOCUMENT HAS BEEN ELECTRONICALLY SIGNED BY: MISHA FIGUEROA MD on  2009  2:34PM        Results for orders placed during the hospital encounter of 22    DX-LUMBAR SPINE-2 OR 3 VIEWS    Impression  1.  Minimal retrolisthesis of L1 on L2.  2.  Mild degenerative changes at L5/S1.  3.  Mild facet arthropathy in the lower lumbar spine.              Results for orders placed during the hospital encounter of 09    DX-THORACIC SPINE-WITH SWIMMERS VIEW    Impression  IMPRESSION:    1. MINIMAL ANTERIOR ENDPLATE OSTEOPHYTOSIS INFERIORLY.  OTHERWISE  NEGATIVE EXAMINATION OF THE THORACIC SPINE.    SST:dxm       Results for orders placed during the hospital encounter of 04/20/15    DX-WRIST-LIMITED 2-    Impression  No acute osseous abnormality is identified.        DIAGNOSIS   Visit Diagnoses     ICD-10-CM   1. Lumbar spondylosis  M47.816   2. Lumbar radiculopathy  M54.16   3. Chronic hip pain  M25.559    G89.29   4. Impaired mobility and ADLs  Z74.09    Z78.9         ASSESSMENT and PLAN:     [facility]  1975 [facility]      Balbina was seen today for follow-up.    Diagnoses and all orders for this visit:    Lumbar spondylosis  Comments:  stable, significantly improved pain and function after the frequency neurotomy  Orders:  -     gabapentin (NEURONTIN) 300 MG Cap; Take 1-2 Capsules by mouth 3 times a day as needed (pain).  -     Referral to Physical Therapy    Lumbar radiculopathy  Comments:  Stable, improved after epidural steroid injection.  Likely with some residual discogenic pain.  We  discussed exercises  Orders:  -     gabapentin (NEURONTIN) 300 MG Cap; Take 1-2 Capsules by mouth 3 times a day as needed (pain).  -     Referral to Physical Therapy    Chronic hip pain  Comments:  Stable, continue home exercise program  Orders:  -     gabapentin (NEURONTIN) 300 MG Cap; Take 1-2 Capsules by mouth 3 times a day as needed (pain).  -     Referral to Physical Therapy    Impaired mobility and ADLs  -     gabapentin (NEURONTIN) 300 MG Cap; Take 1-2 Capsules by mouth 3 times a day as needed (pain).  -     Referral to Physical Therapy        Medications: Continue Zanaflex, gabapentin, Tylenol as needed. We discussed tapering down as tolerated.      Follow up: 1 year or sooner as needed    Thank you for allowing me to participate in the care of this patient. If you have any questions please not hesitate to contact me.             Please note that this dictation was created using voice recognition software. I have made every reasonable attempt to correct obvious errors but there may be errors of grammar and content that I may have overlooked prior to finalization of this note.      Davide Bartholomew MD  Interventional Spine and Sports Physiatry  Physical Medicine and Rehabilitation  [facility]

## 2022-12-05 ENCOUNTER — HOSPITAL ENCOUNTER (OUTPATIENT)
Dept: RADIOLOGY | Facility: MEDICAL CENTER | Age: 47
End: 2022-12-05
Attending: FAMILY MEDICINE
Payer: COMMERCIAL

## 2022-12-05 DIAGNOSIS — Z12.31 VISIT FOR SCREENING MAMMOGRAM: ICD-10-CM

## 2022-12-05 PROCEDURE — 77063 BREAST TOMOSYNTHESIS BI: CPT

## 2022-12-06 ENCOUNTER — TELEPHONE (OUTPATIENT)
Dept: SCHEDULING | Facility: IMAGING CENTER | Age: 47
End: 2022-12-06

## 2022-12-17 SDOH — ECONOMIC STABILITY: INCOME INSECURITY: HOW HARD IS IT FOR YOU TO PAY FOR THE VERY BASICS LIKE FOOD, HOUSING, MEDICAL CARE, AND HEATING?: NOT VERY HARD

## 2022-12-17 SDOH — HEALTH STABILITY: MENTAL HEALTH
STRESS IS WHEN SOMEONE FEELS TENSE, NERVOUS, ANXIOUS, OR CAN'T SLEEP AT NIGHT BECAUSE THEIR MIND IS TROUBLED. HOW STRESSED ARE YOU?: TO SOME EXTENT

## 2022-12-17 SDOH — ECONOMIC STABILITY: TRANSPORTATION INSECURITY
IN THE PAST 12 MONTHS, HAS THE LACK OF TRANSPORTATION KEPT YOU FROM MEDICAL APPOINTMENTS OR FROM GETTING MEDICATIONS?: NO

## 2022-12-17 SDOH — ECONOMIC STABILITY: FOOD INSECURITY: WITHIN THE PAST 12 MONTHS, THE FOOD YOU BOUGHT JUST DIDN'T LAST AND YOU DIDN'T HAVE MONEY TO GET MORE.: NEVER TRUE

## 2022-12-17 SDOH — ECONOMIC STABILITY: HOUSING INSECURITY
IN THE LAST 12 MONTHS, WAS THERE A TIME WHEN YOU DID NOT HAVE A STEADY PLACE TO SLEEP OR SLEPT IN A SHELTER (INCLUDING NOW)?: NO

## 2022-12-17 SDOH — ECONOMIC STABILITY: TRANSPORTATION INSECURITY
IN THE PAST 12 MONTHS, HAS LACK OF RELIABLE TRANSPORTATION KEPT YOU FROM MEDICAL APPOINTMENTS, MEETINGS, WORK OR FROM GETTING THINGS NEEDED FOR DAILY LIVING?: NO

## 2022-12-17 SDOH — HEALTH STABILITY: PHYSICAL HEALTH: ON AVERAGE, HOW MANY DAYS PER WEEK DO YOU ENGAGE IN MODERATE TO STRENUOUS EXERCISE (LIKE A BRISK WALK)?: 2 DAYS

## 2022-12-17 SDOH — ECONOMIC STABILITY: TRANSPORTATION INSECURITY
IN THE PAST 12 MONTHS, HAS LACK OF TRANSPORTATION KEPT YOU FROM MEETINGS, WORK, OR FROM GETTING THINGS NEEDED FOR DAILY LIVING?: NO

## 2022-12-17 SDOH — ECONOMIC STABILITY: INCOME INSECURITY: IN THE LAST 12 MONTHS, WAS THERE A TIME WHEN YOU WERE NOT ABLE TO PAY THE MORTGAGE OR RENT ON TIME?: YES

## 2022-12-17 SDOH — ECONOMIC STABILITY: FOOD INSECURITY: WITHIN THE PAST 12 MONTHS, YOU WORRIED THAT YOUR FOOD WOULD RUN OUT BEFORE YOU GOT MONEY TO BUY MORE.: NEVER TRUE

## 2022-12-17 SDOH — HEALTH STABILITY: PHYSICAL HEALTH: ON AVERAGE, HOW MANY MINUTES DO YOU ENGAGE IN EXERCISE AT THIS LEVEL?: 60 MIN

## 2022-12-17 SDOH — ECONOMIC STABILITY: HOUSING INSECURITY
IN THE LAST 12 MONTHS, WAS THERE A TIME WHEN YOU DID NOT HAVE A STEADY PLACE TO SLEEP OR SLEPT IN A SHELTER (INCLUDING NOW)?: YES

## 2022-12-17 SDOH — ECONOMIC STABILITY: HOUSING INSECURITY: IN THE LAST 12 MONTHS, HOW MANY PLACES HAVE YOU LIVED?: 1

## 2022-12-17 ASSESSMENT — LIFESTYLE VARIABLES
HOW MANY STANDARD DRINKS CONTAINING ALCOHOL DO YOU HAVE ON A TYPICAL DAY: 1 OR 2
AUDIT-C TOTAL SCORE: 2
SKIP TO QUESTIONS 9-10: 0
HOW OFTEN DO YOU HAVE A DRINK CONTAINING ALCOHOL: MONTHLY OR LESS
HOW OFTEN DO YOU HAVE SIX OR MORE DRINKS ON ONE OCCASION: LESS THAN MONTHLY

## 2022-12-17 ASSESSMENT — SOCIAL DETERMINANTS OF HEALTH (SDOH)
IN A TYPICAL WEEK, HOW MANY TIMES DO YOU TALK ON THE PHONE WITH FAMILY, FRIENDS, OR NEIGHBORS?: MORE THAN THREE TIMES A WEEK
DO YOU BELONG TO ANY CLUBS OR ORGANIZATIONS SUCH AS CHURCH GROUPS UNIONS, FRATERNAL OR ATHLETIC GROUPS, OR SCHOOL GROUPS?: NO
DO YOU BELONG TO ANY CLUBS OR ORGANIZATIONS SUCH AS CHURCH GROUPS UNIONS, FRATERNAL OR ATHLETIC GROUPS, OR SCHOOL GROUPS?: NO
HOW OFTEN DO YOU HAVE A DRINK CONTAINING ALCOHOL: MONTHLY OR LESS
IN A TYPICAL WEEK, HOW MANY TIMES DO YOU TALK ON THE PHONE WITH FAMILY, FRIENDS, OR NEIGHBORS?: MORE THAN THREE TIMES A WEEK
HOW OFTEN DO YOU ATTEND CHURCH OR RELIGIOUS SERVICES?: NEVER
HOW HARD IS IT FOR YOU TO PAY FOR THE VERY BASICS LIKE FOOD, HOUSING, MEDICAL CARE, AND HEATING?: NOT VERY HARD
WITHIN THE PAST 12 MONTHS, YOU WORRIED THAT YOUR FOOD WOULD RUN OUT BEFORE YOU GOT THE MONEY TO BUY MORE: NEVER TRUE
HOW OFTEN DO YOU ATTENT MEETINGS OF THE CLUB OR ORGANIZATION YOU BELONG TO?: NEVER
HOW OFTEN DO YOU ATTENT MEETINGS OF THE CLUB OR ORGANIZATION YOU BELONG TO?: NEVER
HOW OFTEN DO YOU ATTEND CHURCH OR RELIGIOUS SERVICES?: NEVER
HOW OFTEN DO YOU GET TOGETHER WITH FRIENDS OR RELATIVES?: TWICE A WEEK
HOW OFTEN DO YOU GET TOGETHER WITH FRIENDS OR RELATIVES?: TWICE A WEEK
HOW OFTEN DO YOU HAVE SIX OR MORE DRINKS ON ONE OCCASION: LESS THAN MONTHLY
HOW MANY DRINKS CONTAINING ALCOHOL DO YOU HAVE ON A TYPICAL DAY WHEN YOU ARE DRINKING: 1 OR 2

## 2022-12-19 ENCOUNTER — OFFICE VISIT (OUTPATIENT)
Dept: MEDICAL GROUP | Facility: PHYSICIAN GROUP | Age: 47
End: 2022-12-19
Payer: COMMERCIAL

## 2022-12-19 VITALS
HEIGHT: 66 IN | BODY MASS INDEX: 41.46 KG/M2 | OXYGEN SATURATION: 98 % | DIASTOLIC BLOOD PRESSURE: 70 MMHG | RESPIRATION RATE: 18 BRPM | WEIGHT: 258 LBS | HEART RATE: 61 BPM | SYSTOLIC BLOOD PRESSURE: 130 MMHG | TEMPERATURE: 98.2 F

## 2022-12-19 DIAGNOSIS — F40.243 FEAR OF FLYING: ICD-10-CM

## 2022-12-19 DIAGNOSIS — Z23 NEED FOR VACCINATION: ICD-10-CM

## 2022-12-19 DIAGNOSIS — Z13.0 SCREENING FOR ENDOCRINE, METABOLIC AND IMMUNITY DISORDER: ICD-10-CM

## 2022-12-19 DIAGNOSIS — Z13.228 SCREENING FOR ENDOCRINE, METABOLIC AND IMMUNITY DISORDER: ICD-10-CM

## 2022-12-19 DIAGNOSIS — M47.816 LUMBAR SPONDYLOSIS: ICD-10-CM

## 2022-12-19 DIAGNOSIS — Z13.29 SCREENING FOR ENDOCRINE, METABOLIC AND IMMUNITY DISORDER: ICD-10-CM

## 2022-12-19 PROCEDURE — 90686 IIV4 VACC NO PRSV 0.5 ML IM: CPT | Performed by: PHYSICIAN ASSISTANT

## 2022-12-19 PROCEDURE — 99214 OFFICE O/P EST MOD 30 MIN: CPT | Mod: 25 | Performed by: PHYSICIAN ASSISTANT

## 2022-12-19 PROCEDURE — 90471 IMMUNIZATION ADMIN: CPT | Performed by: PHYSICIAN ASSISTANT

## 2022-12-19 PROCEDURE — 90715 TDAP VACCINE 7 YRS/> IM: CPT | Performed by: PHYSICIAN ASSISTANT

## 2022-12-19 PROCEDURE — 90472 IMMUNIZATION ADMIN EACH ADD: CPT | Performed by: PHYSICIAN ASSISTANT

## 2022-12-19 RX ORDER — POLYETHYLENE GLYCOL 3350, SODIUM SULFATE, SODIUM CHLORIDE, POTASSIUM CHLORIDE, SODIUM ASCORBATE, AND ASCORBIC ACID 7.5-2.691G
KIT ORAL
COMMUNITY
Start: 2022-11-07 | End: 2023-02-27

## 2022-12-19 ASSESSMENT — FIBROSIS 4 INDEX: FIB4 SCORE: 1.03

## 2022-12-19 NOTE — LETTER
[facility]  Mariah Christensen P.A.-C.  [address]  [place] [address]  Fax: [phone]   Authorization for Release/Disclosure of   Protected Health Information   Name: BALBINA RANDALL : 1975 SSN: [id number]   Address: [address] [place] [address] Phone:    [phone] (home)    I authorize the entity listed below to release/disclose the PHI below to:   [facility]/Mariah Christensen P.A.-C. and Mariah Christensen P.A.-C.   Provider or Entity Name:  [facility]    Address   City, State, [facility]   Phone:      Fax:     Reason for request: continuity of care   Information to be released:    [  ] LAST COLONOSCOPY,  including any PATH REPORT and follow-up  [  ] LAST FIT/COLOGUARD RESULT [  ] LAST DEXA  [  ] LAST MAMMOGRAM  [  ] LAST PAP  [  ] LAST LABS [  ] RETINA EXAM REPORT  [  ] IMMUNIZATION RECORDS  [ x ] Release all info      [  ] Check here and initial the line next to each item to release ALL health information INCLUDING  _____ Care and treatment for drug and / or alcohol abuse  _____ HIV testing, infection status, or AIDS  _____ Genetic Testing    DATES OF SERVICE OR TIME PERIOD TO BE DISCLOSED: _____________  I understand and acknowledge that:  * This Authorization may be revoked at any time by you in writing, except if your health information has already been used or disclosed.  * Your health information that will be used or disclosed as a result of you signing this authorization could be re-disclosed by the recipient. If this occurs, your re-disclosed health information may no longer be protected by State or Federal laws.  * You may refuse to sign this Authorization. Your refusal will not affect your ability to obtain treatment.  * This Authorization becomes effective upon signing and will  on (date) __________.      If no date is indicated, this Authorization will  one (1) year from the signature date.    Name: Balbina Randall    Signature:   Date:      12/19/2022       PLEASE FAX REQUESTED RECORDS BACK TO: [phone]

## 2022-12-19 NOTE — LETTER
[facility]  Mariah Christensen P.A.-C.  [address]  [facility] [address]  Fax: [phone]   Authorization for Release/Disclosure of   Protected Health Information   Name: LARRY YOU : 1975 SSN: [id number]   Address: [address] [place] [address] Phone:    [phone] (home)    I authorize the entity listed below to release/disclose the PHI below to:   [facility]/Mariah Christensen P.A.-C. and Mariah Christensen P.A.-C.   Provider or Entity Name:  Dr. Bryson Esteban MD   Address   City, State, [facility]   Phone:      Fax:     Reason for request: continuity of care   Information to be released:    [  ] LAST COLONOSCOPY,  including any PATH REPORT and follow-up  [  ] LAST FIT/COLOGUARD RESULT [  ] LAST DEXA  [  ] LAST MAMMOGRAM  [  ] LAST PAP  [  ] LAST LABS [  ] RETINA EXAM REPORT  [  ] IMMUNIZATION RECORDS  [ x ] Release all info      [  ] Check here and initial the line next to each item to release ALL health information INCLUDING  _____ Care and treatment for drug and / or alcohol abuse  _____ HIV testing, infection status, or AIDS  _____ Genetic Testing    DATES OF SERVICE OR TIME PERIOD TO BE DISCLOSED: _____________  I understand and acknowledge that:  * This Authorization may be revoked at any time by you in writing, except if your health information has already been used or disclosed.  * Your health information that will be used or disclosed as a result of you signing this authorization could be re-disclosed by the recipient. If this occurs, your re-disclosed health information may no longer be protected by State or Federal laws.  * You may refuse to sign this Authorization. Your refusal will not affect your ability to obtain treatment.  * This Authorization becomes effective upon signing and will  on (date) __________.      If no date is indicated, this Authorization will  one (1) year from the signature date.    Name: Larry Brito  Prakash    Signature:   Date:     12/19/2022       PLEASE FAX REQUESTED RECORDS BACK TO: [phone]

## 2022-12-19 NOTE — PROGRESS NOTES
Subjective:     CC: establish care, back pain    HPI:   Balbina presents today to establish care.  Patient is currently under the care of Dr. Bartholomew for lumbar spondylosis.    Past Medical History:   Diagnosis Date    Airway problem 1/19/15    abnormal airway assessment as noted on procedure orders per MD (Dr. Serrato)    Heart burn     Indigestion     Other specified symptom associated with female genital organs     ovarian cyst, adhesions    Harleen-Parkinson-White syndrome     WPW (Harleen-Parkinson-White syndrome) since birth    cardiologist, Dr. So       Social History     Tobacco Use    Smoking status: Never    Smokeless tobacco: Never   Vaping Use    Vaping Use: Never used   Substance Use Topics    Alcohol use: Yes     Alcohol/week: 0.0 oz     Comment: 3-4 month    Drug use: No       Current Outpatient Medications Ordered in Epic   Medication Sig Dispense Refill    PEG-3350/ELECTROLYTES/ASCORBAT 100 g Recon Soln FOLLOW [facility] HANDOUT      gabapentin (NEURONTIN) 300 MG Cap Take 1-2 Capsules by mouth 3 times a day as needed (pain). 180 Capsule 5    tizanidine (ZANAFLEX) 4 MG Tab Take 1 Tablet by mouth at bedtime as needed (pain). 30 Tablet 5    estradiol (VAGIFEM) 10 MCG Tab insert one tablet vaginally two times a week      acetaminophen (TYLENOL) 500 MG Tab Take 2 Tablets by mouth 3 times a day as needed (pain.  Do not exceed 3000 mg of total acetaminophen per day). 180 Tablet 6    sucralfate (CARAFATE) 1 GM/10ML Suspension       pantoprazole (PROTONIX) 40 MG Tablet Delayed Response        No current Epic-ordered facility-administered medications on file.       Allergies:  Latex, Other drug, Sulfa drugs, and Wound dressing adhesive    Health Maintenance: Completed    ROS:  Gen: no fevers/chills  Eyes: no changes in vision  ENT: no sore throat  Pulm: no sob, no cough  CV: no chest pain  GI: no nausea/vomiting  : no dysuria  MSk: Positive for back pain  Skin: no rash  Neuro: no  "headaches    Objective:       Exam:  /70   Pulse 61   Temp 36.8 °C (98.2 °F) (Temporal)   Resp 18   Ht 1.676 m (5' 6\")   Wt 117 kg (258 lb)   SpO2 98%   Breastfeeding No   BMI 41.64 kg/m²  Body mass index is 41.64 kg/m².    Gen: Alert and oriented, No apparent distress.  Skin: Warm, dry, good turgor, no rashes in visible areas.  HEENT: Normocephalic. Eyes conjunctiva clear lids without ptosis, pupils equal and reactive to light accommodation, ears normal shape and contour  Neck: Trachea midline, no masses, no thyromegaly  Lungs: Normal effort, CTA bilaterally, no wheezes, rhonchi, or rales  CV: Regular rate and rhythm. No murmurs, rubs, or gallops.  MSK: Normal gait, moves all extremities.  Neuro: Grossly non-focal.  Ext: No clubbing, cyanosis, edema.  Psych: Alert and oriented x3, normal affect and mood.     Labs: Labs from 5/11/2021 were reviewed and discussed with the patient. All questions were answered.     Assessment & Plan:     47 y.o. female with the following -     1. Lumbar spondylosis  Chronic, managed by Dr. Bartholomew.  Patient recently had an ablation completed.    2.  Fear flying  Chronic.  Patient states that she has an upcoming trip to [facility] next year.  Concerned about the length of the flight.  Discussed different treatment options with the patient and she states she would like to research it.  Will return for an appointment prior to her trip.    3. Screening for endocrine, metabolic and immunity disorder  - CBC WITH DIFFERENTIAL; Future  - Comp Metabolic Panel; Future  - VITAMIN D,25 HYDROXY (DEFICIENCY); Future  - Lipid Profile; Future  - TSH WITH REFLEX TO FT4; Future  - HEMOGLOBIN A1C; Future    4. Need for vaccination  - Tdap Vaccine =>6YO IM  - INFLUENZA VACCINE QUAD INJ (PF)      I spent a total of 31 minutes with record review (including external notes and labs), exam, communication with the patient, communication with other providers, and documentation of this encounter.     No " follow-ups on file.    Please note that this dictation was created using voice recognition software. I have made every reasonable attempt to correct obvious errors, but I expect that there are errors of grammar and possibly content that I did not discover before finalizing the note.    Electronically signed by Mariah Christensen PA-C on December 19, 2022

## 2022-12-19 NOTE — PATIENT INSTRUCTIONS
Please take 4,000 IU of Vitamin D and 1,200 mg calcium daily. You can buy these over the counter. These help with energy, our immune system, and our bone strength to help prevent osteoporosis.

## 2023-01-23 ENCOUNTER — OFFICE VISIT (OUTPATIENT)
Dept: MEDICAL GROUP | Facility: PHYSICIAN GROUP | Age: 48
End: 2023-01-23
Payer: COMMERCIAL

## 2023-01-23 VITALS
DIASTOLIC BLOOD PRESSURE: 80 MMHG | SYSTOLIC BLOOD PRESSURE: 120 MMHG | OXYGEN SATURATION: 94 % | WEIGHT: 261 LBS | HEIGHT: 66 IN | HEART RATE: 76 BPM | RESPIRATION RATE: 18 BRPM | TEMPERATURE: 98.3 F | BODY MASS INDEX: 41.95 KG/M2

## 2023-01-23 DIAGNOSIS — R63.5 WEIGHT GAIN: ICD-10-CM

## 2023-01-23 DIAGNOSIS — M79.661 RIGHT CALF PAIN: ICD-10-CM

## 2023-01-23 DIAGNOSIS — K21.9 GASTROESOPHAGEAL REFLUX DISEASE WITHOUT ESOPHAGITIS: ICD-10-CM

## 2023-01-23 DIAGNOSIS — L98.9 SKIN LESION: ICD-10-CM

## 2023-01-23 PROCEDURE — 99214 OFFICE O/P EST MOD 30 MIN: CPT | Performed by: PHYSICIAN ASSISTANT

## 2023-01-23 RX ORDER — PHENTERMINE HYDROCHLORIDE 15 MG/1
15 CAPSULE ORAL EVERY MORNING
Qty: 30 CAPSULE | Refills: 0 | Status: SHIPPED | OUTPATIENT
Start: 2023-01-23 | End: 2023-02-22

## 2023-01-23 RX ORDER — TOPIRAMATE 25 MG/1
25 TABLET ORAL 2 TIMES DAILY
Qty: 60 TABLET | Refills: 3 | Status: SHIPPED | OUTPATIENT
Start: 2023-01-23 | End: 2023-02-27 | Stop reason: SINTOL

## 2023-01-23 ASSESSMENT — PATIENT HEALTH QUESTIONNAIRE - PHQ9: CLINICAL INTERPRETATION OF PHQ2 SCORE: 0

## 2023-01-23 ASSESSMENT — FIBROSIS 4 INDEX: FIB4 SCORE: 1.03

## 2023-01-23 NOTE — PROGRESS NOTES
Subjective:     CC: Calf pain    HPI:   Balbina presents today with concerns for 2 to 3 weeks of right calf pain.  States that the pain radiates from her calf up to her the back of her knee. Nothing improves the pain. Feels achey. Tried stretching which did not help. No recent injury.     Patient also would like me to look in a freckle on her right chest.  States that is been there since last summer.    Patient would also like to discuss weight gain. Has had a hard time with menopause. Feels like she is eating healthier than she used to     Past Medical History:   Diagnosis Date    Airway problem 1/19/15    abnormal airway assessment as noted on procedure orders per MD (Dr. Serrato)    Heart burn     Indigestion     Other specified symptom associated with female genital organs     ovarian cyst, adhesions    Harleen-Parkinson-White syndrome     WPW (Harleen-Parkinson-White syndrome) since birth    cardiologist, Dr. So       Social History     Tobacco Use    Smoking status: Never    Smokeless tobacco: Never   Vaping Use    Vaping Use: Never used   Substance Use Topics    Alcohol use: Yes     Alcohol/week: 0.0 oz     Comment: 3-4 month    Drug use: No       Current Outpatient Medications Ordered in Epic   Medication Sig Dispense Refill    phentermine 15 MG capsule Take 1 Capsule by mouth every morning for 30 days. 30 Capsule 0    topiramate (TOPAMAX) 25 MG Tab Take 1 Tablet by mouth 2 times a day. 60 Tablet 3    PEG-3350/ELECTROLYTES/ASCORBAT 100 g Recon Soln FOLLOW [facility]S HANDOUT      gabapentin (NEURONTIN) 300 MG Cap Take 1-2 Capsules by mouth 3 times a day as needed (pain). 180 Capsule 5    tizanidine (ZANAFLEX) 4 MG Tab Take 1 Tablet by mouth at bedtime as needed (pain). 30 Tablet 5    estradiol (VAGIFEM) 10 MCG Tab insert one tablet vaginally two times a week      acetaminophen (TYLENOL) 500 MG Tab Take 2 Tablets by mouth 3 times a day as needed (pain.  Do not exceed 3000 mg of total acetaminophen per  "day). 180 Tablet 6    sucralfate (CARAFATE) 1 GM/10ML Suspension       pantoprazole (PROTONIX) 40 MG Tablet Delayed Response        No current Epic-ordered facility-administered medications on file.       Allergies:  Latex, Other drug, Sulfa drugs, and Wound dressing adhesive    Health Maintenance: Completed    ROS:  Gen: no fevers/chills  Eyes: no changes in vision  ENT: no sore throat  Pulm: no sob, no cough  CV: no chest pain  GI: no nausea/vomiting  : no dysuria  MSk: Positive for calf pain  Skin: no rash  Neuro: no headaches    Objective:     Exam:  /80   Pulse 76   Temp 36.8 °C (98.3 °F) (Temporal)   Resp 18   Ht 1.676 m (5' 6\")   Wt 118 kg (261 lb)   SpO2 94%   BMI 42.13 kg/m²  Body mass index is 42.13 kg/m².    Gen: Alert and oriented, No apparent distress.  Skin: Warm, dry, good turgor, no rashes in visible areas.  HEENT: Normocephalic. Eyes conjunctiva clear lids without ptosis, pupils equal and reactive to light accommodation, ears normal shape and contour  Neck: Trachea midline, no masses, no thyromegaly  Lungs: Normal effort, CTA bilaterally, no wheezes, rhonchi, or rales  CV: Regular rate and rhythm. No murmurs, rubs, or gallops.  MSK: Normal gait, moves all extremities.  Right calf tenderness to palpation and tenderness to palpation of right popliteal space, no significant swelling  Neuro: Grossly non-focal.  Ext: No clubbing, cyanosis, edema.  Psych: Alert and oriented x3, normal affect and mood.     Labs: No recent labs to review-labs already ordered    Assessment & Plan:     47 y.o. female with the following -     1. Right calf pain  Acute.  Patient has had right calf pain over the last few weeks.  Ultrasound ordered to rule out DVT.  Did discuss possibility of a Baker's cyst.  Patient is only on estradiol vaginal suppository as needed.  Follow-up after ultrasound  - US-EXTREMITY VENOUS LOWER UNILAT RIGHT; Future    2. Skin lesion  Patient's skin lesion consistent with since back, " however she does have a significant amount of freckles so recommended annual skin check as patient states she does spend a lot of time in the sun.  - Referral to Dermatology    3. Weight gain  Chronic.  Patient states she is on a difficult time with weight gain, particularly after going through menopause.  Different treatment options discussed with the patient.  She would like to trial phentermine and topiramate.  We will have her follow-up in a month for reevaluation.  Educated about side effects of the medication.  Patient is also requesting a referral to Dr. Kim for consultation as well.  Patient is aware that medication is meant to be used short-term as well sign is a controlled substance with abuse potential.  PDMP reviewed and no prescriptions available.  Discussed short-term use for 3 months or less  - Referral to General Surgery  - phentermine 15 MG capsule; Take 1 Capsule by mouth every morning for 30 days.  Dispense: 30 Capsule; Refill: 0  - topiramate (TOPAMAX) 25 MG Tab; Take 1 Tablet by mouth 2 times a day.  Dispense: 60 Tablet; Refill: 3    4. Gastroesophageal reflux disease without esophagitis  - Referral to General Surgery      I spent a total of 35 minutes with record review (including external notes and labs), exam, communication with the patient, communication with other providers, and documentation of this encounter.     Return in about 4 weeks (around 2/20/2023) for follow up on meds.    Please note that this dictation was created using voice recognition software. I have made every reasonable attempt to correct obvious errors, but I expect that there are errors of grammar and possibly content that I did not discover before finalizing the note.    Electronically signed by Mariah Christensen PA-C on January 23, 2023

## 2023-01-24 ENCOUNTER — HOSPITAL ENCOUNTER (OUTPATIENT)
Dept: LAB | Facility: MEDICAL CENTER | Age: 48
End: 2023-01-24
Attending: PHYSICIAN ASSISTANT
Payer: COMMERCIAL

## 2023-01-24 DIAGNOSIS — Z13.0 SCREENING FOR ENDOCRINE, METABOLIC AND IMMUNITY DISORDER: ICD-10-CM

## 2023-01-24 DIAGNOSIS — Z13.228 SCREENING FOR ENDOCRINE, METABOLIC AND IMMUNITY DISORDER: ICD-10-CM

## 2023-01-24 DIAGNOSIS — Z13.29 SCREENING FOR ENDOCRINE, METABOLIC AND IMMUNITY DISORDER: ICD-10-CM

## 2023-01-24 LAB
25(OH)D3 SERPL-MCNC: 24 NG/ML (ref 30–100)
ALBUMIN SERPL BCP-MCNC: 4.6 G/DL (ref 3.2–4.9)
ALBUMIN/GLOB SERPL: 1.4 G/DL
ALP SERPL-CCNC: 107 U/L (ref 30–99)
ALT SERPL-CCNC: 44 U/L (ref 2–50)
ANION GAP SERPL CALC-SCNC: 14 MMOL/L (ref 7–16)
AST SERPL-CCNC: 38 U/L (ref 12–45)
BASOPHILS # BLD AUTO: 0.5 % (ref 0–1.8)
BASOPHILS # BLD: 0.04 K/UL (ref 0–0.12)
BILIRUB SERPL-MCNC: 0.4 MG/DL (ref 0.1–1.5)
BUN SERPL-MCNC: 16 MG/DL (ref 8–22)
CALCIUM ALBUM COR SERPL-MCNC: 9.3 MG/DL (ref 8.5–10.5)
CALCIUM SERPL-MCNC: 9.8 MG/DL (ref 8.5–10.5)
CHLORIDE SERPL-SCNC: 105 MMOL/L (ref 96–112)
CHOLEST SERPL-MCNC: 221 MG/DL (ref 100–199)
CO2 SERPL-SCNC: 24 MMOL/L (ref 20–33)
CREAT SERPL-MCNC: 0.83 MG/DL (ref 0.5–1.4)
EOSINOPHIL # BLD AUTO: 0.28 K/UL (ref 0–0.51)
EOSINOPHIL NFR BLD: 3.8 % (ref 0–6.9)
ERYTHROCYTE [DISTWIDTH] IN BLOOD BY AUTOMATED COUNT: 43.5 FL (ref 35.9–50)
EST. AVERAGE GLUCOSE BLD GHB EST-MCNC: 114 MG/DL
GFR SERPLBLD CREATININE-BSD FMLA CKD-EPI: 86 ML/MIN/1.73 M 2
GLOBULIN SER CALC-MCNC: 3.3 G/DL (ref 1.9–3.5)
GLUCOSE SERPL-MCNC: 83 MG/DL (ref 65–99)
HBA1C MFR BLD: 5.6 % (ref 4–5.6)
HCT VFR BLD AUTO: 45.8 % (ref 37–47)
HDLC SERPL-MCNC: 61 MG/DL
HGB BLD-MCNC: 14.8 G/DL (ref 12–16)
IMM GRANULOCYTES # BLD AUTO: 0.04 K/UL (ref 0–0.11)
IMM GRANULOCYTES NFR BLD AUTO: 0.5 % (ref 0–0.9)
LDLC SERPL CALC-MCNC: 134 MG/DL
LYMPHOCYTES # BLD AUTO: 2.6 K/UL (ref 1–4.8)
LYMPHOCYTES NFR BLD: 35 % (ref 22–41)
MCH RBC QN AUTO: 30.6 PG (ref 27–33)
MCHC RBC AUTO-ENTMCNC: 32.3 G/DL (ref 33.6–35)
MCV RBC AUTO: 94.6 FL (ref 81.4–97.8)
MONOCYTES # BLD AUTO: 0.52 K/UL (ref 0–0.85)
MONOCYTES NFR BLD AUTO: 7 % (ref 0–13.4)
NEUTROPHILS # BLD AUTO: 3.94 K/UL (ref 2–7.15)
NEUTROPHILS NFR BLD: 53.2 % (ref 44–72)
NRBC # BLD AUTO: 0 K/UL
NRBC BLD-RTO: 0 /100 WBC
PLATELET # BLD AUTO: 225 K/UL (ref 164–446)
PMV BLD AUTO: 12.4 FL (ref 9–12.9)
POTASSIUM SERPL-SCNC: 4.1 MMOL/L (ref 3.6–5.5)
PROT SERPL-MCNC: 7.9 G/DL (ref 6–8.2)
RBC # BLD AUTO: 4.84 M/UL (ref 4.2–5.4)
SODIUM SERPL-SCNC: 143 MMOL/L (ref 135–145)
TRIGL SERPL-MCNC: 131 MG/DL (ref 0–149)
TSH SERPL DL<=0.005 MIU/L-ACNC: 1.23 UIU/ML (ref 0.38–5.33)
WBC # BLD AUTO: 7.4 K/UL (ref 4.8–10.8)

## 2023-01-24 PROCEDURE — 85025 COMPLETE CBC W/AUTO DIFF WBC: CPT

## 2023-01-24 PROCEDURE — 83036 HEMOGLOBIN GLYCOSYLATED A1C: CPT

## 2023-01-24 PROCEDURE — 80061 LIPID PANEL: CPT

## 2023-01-24 PROCEDURE — 36415 COLL VENOUS BLD VENIPUNCTURE: CPT

## 2023-01-24 PROCEDURE — 80053 COMPREHEN METABOLIC PANEL: CPT

## 2023-01-24 PROCEDURE — 84443 ASSAY THYROID STIM HORMONE: CPT

## 2023-01-24 PROCEDURE — 82306 VITAMIN D 25 HYDROXY: CPT

## 2023-02-27 ENCOUNTER — OFFICE VISIT (OUTPATIENT)
Dept: MEDICAL GROUP | Facility: PHYSICIAN GROUP | Age: 48
End: 2023-02-27
Payer: COMMERCIAL

## 2023-02-27 ENCOUNTER — HOSPITAL ENCOUNTER (OUTPATIENT)
Dept: RADIOLOGY | Facility: MEDICAL CENTER | Age: 48
End: 2023-02-27
Attending: PHYSICIAN ASSISTANT
Payer: COMMERCIAL

## 2023-02-27 VITALS
OXYGEN SATURATION: 96 % | WEIGHT: 258 LBS | HEIGHT: 66 IN | HEART RATE: 67 BPM | SYSTOLIC BLOOD PRESSURE: 122 MMHG | BODY MASS INDEX: 41.46 KG/M2 | TEMPERATURE: 98.2 F | RESPIRATION RATE: 18 BRPM | DIASTOLIC BLOOD PRESSURE: 80 MMHG

## 2023-02-27 DIAGNOSIS — H92.01 OTALGIA, RIGHT EAR: ICD-10-CM

## 2023-02-27 DIAGNOSIS — E55.9 VITAMIN D DEFICIENCY: ICD-10-CM

## 2023-02-27 DIAGNOSIS — E78.5 DYSLIPIDEMIA: ICD-10-CM

## 2023-02-27 DIAGNOSIS — R63.5 WEIGHT GAIN: ICD-10-CM

## 2023-02-27 DIAGNOSIS — M79.661 RIGHT CALF PAIN: ICD-10-CM

## 2023-02-27 PROCEDURE — 99214 OFFICE O/P EST MOD 30 MIN: CPT | Performed by: PHYSICIAN ASSISTANT

## 2023-02-27 PROCEDURE — 93971 EXTREMITY STUDY: CPT | Mod: RT

## 2023-02-27 RX ORDER — PHENTERMINE HYDROCHLORIDE 15 MG/1
15 CAPSULE ORAL EVERY MORNING
Qty: 30 CAPSULE | Refills: 0 | Status: SHIPPED | OUTPATIENT
Start: 2023-02-27 | End: 2023-03-29

## 2023-02-27 ASSESSMENT — FIBROSIS 4 INDEX: FIB4 SCORE: 1.27

## 2023-02-27 NOTE — PROGRESS NOTES
Subjective:     CC: Weight gain, ear pain    HPI:   Balbina presents today for follow-up on weight gain.  Patient was seen a month ago and started on topiramate and phentermine.  She has had a bad taste in her mouth.     Patient also states that she has had right ear pain for the last 4 days.  States that about 2 weeks ago she had a head cold that is since resolved.  Feels like her ear is popping.     Past Medical History:   Diagnosis Date    Airway problem 1/19/15    abnormal airway assessment as noted on procedure orders per MD (Dr. Serrato)    Heart burn     Indigestion     Other specified symptom associated with female genital organs     ovarian cyst, adhesions    Harleen-Parkinson-White syndrome     WPW (Harleen-Parkinson-White syndrome) since birth    cardiologist, Dr. So       Social History     Tobacco Use    Smoking status: Never    Smokeless tobacco: Never   Vaping Use    Vaping Use: Never used   Substance Use Topics    Alcohol use: Yes     Alcohol/week: 0.0 oz     Comment: 3-4 month    Drug use: No       Current Outpatient Medications Ordered in Epic   Medication Sig Dispense Refill    phentermine 15 MG capsule Take 1 Capsule by mouth every morning for 30 days. 30 Capsule 0    Cholecalciferol 99027 UNIT Cap Take 1 Capsule by mouth every 7 days. 12 Capsule 3    gabapentin (NEURONTIN) 300 MG Cap Take 1-2 Capsules by mouth 3 times a day as needed (pain). 180 Capsule 5    tizanidine (ZANAFLEX) 4 MG Tab Take 1 Tablet by mouth at bedtime as needed (pain). 30 Tablet 5    estradiol (VAGIFEM) 10 MCG Tab insert one tablet vaginally two times a week      acetaminophen (TYLENOL) 500 MG Tab Take 2 Tablets by mouth 3 times a day as needed (pain.  Do not exceed 3000 mg of total acetaminophen per day). 180 Tablet 6    sucralfate (CARAFATE) 1 GM/10ML Suspension       pantoprazole (PROTONIX) 40 MG Tablet Delayed Response        No current Epic-ordered facility-administered medications on file.       Allergies:  Other  "misc, Sulfa drugs, Latex, Other drug, and Wound dressing adhesive    Health Maintenance: Completed    ROS:  Gen: no fevers/chills  Eyes: no changes in vision  ENT: no sore throat, positive for ear pain  Pulm: no sob, no cough  CV: no chest pain  GI: no nausea/vomiting  : no dysuria  MSk: no myalgias  Skin: no rash  Neuro: no headaches    Objective:     Exam:  /80   Pulse 67   Temp 36.8 °C (98.2 °F) (Temporal)   Resp 18   Ht 1.676 m (5' 6\")   Wt 117 kg (258 lb)   SpO2 96%   BMI 41.64 kg/m²  Body mass index is 41.64 kg/m².    Gen: Alert and oriented, No apparent distress.  Skin: Warm, dry, good turgor, no rashes in visible areas.  HEENT: Normocephalic. Eyes conjunctiva clear lids without ptosis, pupils equal and reactive to light accommodation, TM normal bilaterally, ear canals normal bilaterally  Neck: Trachea midline, no masses, no thyromegaly  Lungs: Normal effort  MSK: Normal gait, moves all extremities.  Neuro: Grossly non-focal.  Ext: No clubbing, cyanosis, edema.  Psych: Alert and oriented x3, normal affect and mood.     Labs: Labs from 1/24/2023 were reviewed and discussed with the patient. All questions were answered.     Assessment & Plan:     48 y.o. female with the following -     1. Weight gain  Chronic, stable.  Patient has lost 3 pounds over the last month since starting on phentermine and topiramate.  Patient does have a side effect of dysgeusia.  Suspect that this is likely coming from the topiramate, however could be a side effect of phentermine as well.  We will discontinue topiramate at this time and we will plan to continue with phentermine for another month.  Did discuss if she develops a side effect again to discontinue the medication.  Previously placed a referral to Dr. Kim per patient request, where she got a call from [facility].  We will have my MA call \the referrals department to get this corrected.  Did discuss if needing to change medication can trial " Mounjaro.  Educated about potential side effects of this medication.  - phentermine 15 MG capsule; Take 1 Capsule by mouth every morning for 30 days.  Dispense: 30 Capsule; Refill: 0    2. Otalgia, right ear  Acute.  No signs of infection on exam today.  Recommended sinus rinses.  Follow-up for new or worsening concerns.     3. Vitamin D deficiency  Chronic, not well controlled.  Plan to do prescription dose of vitamin D for the next 9 to 12 months.  Patient does have IBS and a partial bowel resection so discussed the importance of vitamin replacement.  We will plan to recheck labs then.  - Cholecalciferol 23947 UNIT Cap; Take 1 Capsule by mouth every 7 days.  Dispense: 12 Capsule; Refill: 3  - VITAMIN D,25 HYDROXY (DEFICIENCY); Future    4. Dyslipidemia  Chronic, stable.  Recent blood work discussed with the patient.  Discussed patient's ASCVD risk of less than 5% so no medication needed at this time.  Recommended that the patient improve lifestyle with less saturated fats and fried foods as well as regular exercise.  Patient is also provided with written instructions on specific diet changes that can make to lower cholesterol.  Recommend screening annually.    I spent a total of 31 minutes with record review (including external notes and labs), exam, communication with the patient, communication with other providers, and documentation of this encounter.     Return in about 4 weeks (around 3/27/2023) for follow up on meds.    Please note that this dictation was created using voice recognition software. I have made every reasonable attempt to correct obvious errors, but I expect that there are errors of grammar and possibly content that I did not discover before finalizing the note.    Electronically signed by Mariah Christensen PA-C on February 27, 2023

## 2023-03-27 ENCOUNTER — OFFICE VISIT (OUTPATIENT)
Dept: MEDICAL GROUP | Facility: PHYSICIAN GROUP | Age: 48
End: 2023-03-27
Payer: COMMERCIAL

## 2023-03-27 VITALS
BODY MASS INDEX: 40.82 KG/M2 | SYSTOLIC BLOOD PRESSURE: 126 MMHG | DIASTOLIC BLOOD PRESSURE: 82 MMHG | RESPIRATION RATE: 16 BRPM | OXYGEN SATURATION: 97 % | WEIGHT: 254 LBS | TEMPERATURE: 97.6 F | HEIGHT: 66 IN | HEART RATE: 91 BPM

## 2023-03-27 DIAGNOSIS — R63.5 WEIGHT GAIN: ICD-10-CM

## 2023-03-27 DIAGNOSIS — F40.243 FEAR OF FLYING: ICD-10-CM

## 2023-03-27 PROBLEM — E55.9 VITAMIN D DEFICIENCY: Status: ACTIVE | Noted: 2023-03-27

## 2023-03-27 PROBLEM — R09.89 OTHER SPECIFIED SYMPTOMS AND SIGNS INVOLVING THE CIRCULATORY AND RESPIRATORY SYSTEMS: Status: ACTIVE | Noted: 2022-11-09

## 2023-03-27 PROBLEM — R13.19 ESOPHAGEAL DYSPHAGIA: Status: ACTIVE | Noted: 2023-03-27

## 2023-03-27 PROBLEM — Z86.010 PERSONAL HISTORY OF COLONIC POLYPS: Status: ACTIVE | Noted: 2023-03-27

## 2023-03-27 PROBLEM — K64.0 FIRST DEGREE HEMORRHOIDS: Status: ACTIVE | Noted: 2022-11-09

## 2023-03-27 PROBLEM — K21.00 GASTRO-ESOPHAGEAL REFLUX DISEASE WITH ESOPHAGITIS: Status: ACTIVE | Noted: 2023-03-27

## 2023-03-27 PROBLEM — R14.0 BLOATING SYMPTOM: Status: ACTIVE | Noted: 2023-03-27

## 2023-03-27 PROBLEM — K58.9 IRRITABLE BOWEL SYNDROME: Status: ACTIVE | Noted: 2023-03-27

## 2023-03-27 PROBLEM — E66.01 MORBID (SEVERE) OBESITY DUE TO EXCESS CALORIES (HCC): Status: ACTIVE | Noted: 2023-03-27

## 2023-03-27 PROBLEM — Z86.0100 PERSONAL HISTORY OF COLONIC POLYPS: Status: ACTIVE | Noted: 2023-03-27

## 2023-03-27 PROCEDURE — 99214 OFFICE O/P EST MOD 30 MIN: CPT | Performed by: PHYSICIAN ASSISTANT

## 2023-03-27 RX ORDER — ALPRAZOLAM 0.25 MG/1
TABLET ORAL
Qty: 4 TABLET | Refills: 0 | Status: SHIPPED | OUTPATIENT
Start: 2023-03-27 | End: 2023-04-27

## 2023-03-27 ASSESSMENT — FIBROSIS 4 INDEX: FIB4 SCORE: 1.27

## 2023-03-27 NOTE — PROGRESS NOTES
Subjective:     CC: Weight gain    HPI:   Balbina presents today for follow-up on weight gain.  At her last appointment patient was on phentermine and topiramate, but she had a bad taste in her mouth so we switched to just doing the phentermine.  Patient states that since being on just the phentermine she has not experienced the bad taste sensation in her mouth and patient is lost 4 pounds since her previous visit. Patient has seen bariatric surgeon who increased phentermine.     Patient has history of bowel obstruction and resection so surgery is not a great option.   Had Pelvoscopy at 15 caused adhesions which caused obstruction. Still gets some GI symptoms.     Past Medical History:   Diagnosis Date    Airway problem 1/19/15    abnormal airway assessment as noted on procedure orders per MD (Dr. Serrato)    Heart burn     Indigestion     Other specified symptom associated with female genital organs     ovarian cyst, adhesions    Harleen-Parkinson-White syndrome     WPW (Harleen-Parkinson-White syndrome) since birth    cardiologist, Dr. So       Social History     Tobacco Use    Smoking status: Never    Smokeless tobacco: Never   Vaping Use    Vaping Use: Never used   Substance Use Topics    Alcohol use: Yes     Alcohol/week: 0.0 oz     Comment: 3-4 month    Drug use: No       Current Outpatient Medications Ordered in Epic   Medication Sig Dispense Refill    ALPRAZolam (XANAX) 0.25 MG Tab Take 1 tablet by mouth prior to flying.  May repeat dose if needed for anxiety 8 hours later for remainder of flight.  Do not take more than 2 tablets in 24 hours. 4 Tablet 0    phentermine 15 MG capsule Take 1 Capsule by mouth every morning for 30 days. 30 Capsule 0    gabapentin (NEURONTIN) 300 MG Cap Take 1-2 Capsules by mouth 3 times a day as needed (pain). 180 Capsule 5    tizanidine (ZANAFLEX) 4 MG Tab Take 1 Tablet by mouth at bedtime as needed (pain). 30 Tablet 5    estradiol (VAGIFEM) 10 MCG Tab insert one tablet  "vaginally two times a week      acetaminophen (TYLENOL) 500 MG Tab Take 2 Tablets by mouth 3 times a day as needed (pain.  Do not exceed 3000 mg of total acetaminophen per day). 180 Tablet 6    sucralfate (CARAFATE) 1 GM/10ML Suspension       pantoprazole (PROTONIX) 40 MG Tablet Delayed Response 40 mg every day.      Cholecalciferol 18737 UNIT Cap Take 1 Capsule by mouth every 7 days. 12 Capsule 3     No current Epic-ordered facility-administered medications on file.       Allergies:  Other misc, Sulfa drugs, Latex, Other drug, and Wound dressing adhesive    Health Maintenance: Completed    ROS:  Gen: no fevers/chills  Eyes: no changes in vision  ENT: no sore throat  Pulm: no sob, no cough  CV: no chest pain  GI: no nausea/vomiting  : no dysuria  MSk: no myalgias  Skin: no rash  Neuro: no headaches  Objective:       Exam:  /82   Pulse 91   Temp 36.4 °C (97.6 °F) (Temporal)   Resp 16   Ht 1.676 m (5' 6\")   Wt 115 kg (254 lb)   SpO2 97%   BMI 41.00 kg/m²  Body mass index is 41 kg/m².    Constitutional: Alert, no distress, well-groomed.  Skin: Warm, dry, good turgor, no rashes in visible areas.  Eye: Equal, round and reactive, conjunctiva clear, lids normal.  ENMT: Lips without lesions, good dentition, moist mucous membranes.  Neck: Trachea midline, no masses, no thyromegaly.  Respiratory: Unlabored respiratory effort, no cough.  MSK: Normal gait, moves all extremities.  Neuro: Grossly non-focal.   Psych: Alert and oriented x3, normal affect and mood.    Assessment & Plan:     48 y.o. female with the following -     1. Weight gain  Chronic, stable.  Patient has lost 4 pounds over the last month being on phentermine 30 mg.  Had an evaluation from Dr. Kim and really patient's only surgical option is a gastric sleeve.  Because she has had so many abdominal surgical procedures in the past as well as complications with scar tissue, do not think this is the best option to pursue at this time.  Did " discuss the possibility of other medications for weight loss as well, however patient has significant GI symptoms regularly so do not think she will be able to tolerate a GLP-1.  Patient was already referred to a  and we discussed the possibility of her restarting weight watchers as this was beneficial in the past.  Patient would like to discontinue phentermine use at this time but may consider it again in the future.    2. Fear of flying  Patient has an upcoming trip planned and has a significant fear of flying.  Flight is supposed to last about 15 hours each way.  Prescription for alprazolam sent in.  Patient is flying with another adult and is instructed not to mix with alcohol.  PDMP reviewed and no inconsistent prescriptions.  Patient is aware this is a controlled substance and is a one-time prescription that I do not refill.  Educated about potential side effects of the medication.  - ALPRAZolam (XANAX) 0.25 MG Tab; Take 1 tablet by mouth prior to flying.  May repeat dose if needed for anxiety 8 hours later for remainder of flight.  Do not take more than 2 tablets in 24 hours.  Dispense: 4 Tablet; Refill: 0    I spent a total of 30 minutes with record review (including external notes and labs), exam, communication with the patient, communication with other providers, and documentation of this encounter.     Return if symptoms worsen or fail to improve.    Please note that this dictation was created using voice recognition software. I have made every reasonable attempt to correct obvious errors, but I expect that there are errors of grammar and possibly content that I did not discover before finalizing the note.    Electronically signed by Mariah Christensen PA-C on March 27, 2023

## 2023-04-17 ENCOUNTER — OFFICE VISIT (OUTPATIENT)
Dept: DERMATOLOGY | Facility: IMAGING CENTER | Age: 48
End: 2023-04-17
Payer: COMMERCIAL

## 2023-04-17 DIAGNOSIS — L73.8 SEBACEOUS HYPERPLASIA: ICD-10-CM

## 2023-04-17 DIAGNOSIS — Z12.83 SKIN CANCER SCREENING: ICD-10-CM

## 2023-04-17 DIAGNOSIS — L81.4 LENTIGINES: ICD-10-CM

## 2023-04-17 DIAGNOSIS — D18.01 CHERRY ANGIOMA: ICD-10-CM

## 2023-04-17 DIAGNOSIS — D22.9 MULTIPLE NEVI: ICD-10-CM

## 2023-04-17 PROCEDURE — 99212 OFFICE O/P EST SF 10 MIN: CPT | Performed by: NURSE PRACTITIONER

## 2023-04-17 RX ORDER — COVID-19 ANTIGEN TEST
KIT MISCELLANEOUS
COMMUNITY
Start: 2023-04-01

## 2023-04-17 NOTE — PROGRESS NOTES
DERMATOLOGY NOTE  NEW VISIT       Chief complaint: Establish Care (LUIS)  Pt here or LUIS. Some some spots to evaluate       History of skin cancer: No  History of precancers/actinic keratoses: No  History of biopsies:No  History of blistering/severe sunburns:Yes, Details: Teens  Family history of skin cancer:No  Family history of atypical moles:No      Allergies   Allergen Reactions    Other Misc      Additive In Estrogen Im    Other reaction(s): Rash    Sulfa Drugs Rash and Itching     [id number]  Other reaction(s): Rash, itching    Latex Rash     [id number]    Other Drug Rash     Allergic to Estrogen IM - preservative within medication  [id number]    Wound Dressing Adhesive Hives     Band-aids cause rash and itching        MEDICATIONS:  Medications relevant to specialty reviewed.     REVIEW OF SYSTEMS:   Positive for skin (see HPI)  Negative for fevers and chills       EXAM:  There were no vitals taken for this visit.  Constitutional: Well-developed, well-nourished, and in no distress.     A total body skin exam was performed excluding the genitals per patient preference and including the following areas: head (including face), neck, chest, abdomen, groin/buttocks, back, bilateral upper extremities, and bilateral lower extremities with the following pertinent findings listed below and/or in assessment/plan.     -sun exposed skin of trunk and b/l upper, lower extremities and face with scattered clinically benign light brown reticulated macules all of which were morphologically similar and none of which were suspicious for skin cancer today on exam    -Few scattered 1-3mm bright red macules and thin papules on the trunk and extremities    -Multiple tan medium brown skin-colored macules papules scattered over the trunk >> extremities--All with benign-appearing pigment network patterns on dermoscopy    -Few scattered yellowish/red papules with telangiectasias and central dell face    IMPRESSION / PLAN:    1. Lentigines  -  Benign-appearing nature of lesions discussed during exam.   - Advised to continue to monitor for any return to clinic for new or concerning changes.      2. Cherry angioma  - Benign-appearing nature of lesions discussed during exam.   - Advised to continue to monitor for any return to clinic for new or concerning changes.      3. Multiple nevi  - Benign-appearing nature of lesions discussed during exam.   - Advised to continue to monitor for any return to clinic for new or concerning changes.  - ABCDE's of melanoma discussed/handout given      4. Sebaceous hyperplasia  - Benign-appearing nature of lesions discussed during exam.   - Advised to continue to monitor for any return to clinic for new or concerning changes.      5. Skin cancer screening  Skin cancer education  discussed importance of sun protective clothing, eyewear in addition to the use of broad spectrum sunscreen with SPF 30 or greater, as well as need for reapplication ~every 2 hours when exposed to UVR/handout given  discussed importance following up for any new or changing lesions as noted in handout given, but every 12 months exams in clinic in the setting of dermatologic history  ABCDE's of melanoma discussed/handout given        Please note that this dictation was created using voice recognition software. I have made every reasonable attempt to correct obvious errors, but I expect that there are errors of grammar and possibly content that I did not discover before finalizing the note.      Return to clinic in: Return in about 1 year (around 4/17/2024) for LUIS. and as needed for any new or changing skin lesions.

## 2023-09-11 ENCOUNTER — HOSPITAL ENCOUNTER (OUTPATIENT)
Facility: MEDICAL CENTER | Age: 48
End: 2023-09-11
Attending: OBSTETRICS & GYNECOLOGY
Payer: COMMERCIAL

## 2023-09-11 PROCEDURE — 82670 ASSAY OF TOTAL ESTRADIOL: CPT

## 2023-09-11 PROCEDURE — 83002 ASSAY OF GONADOTROPIN (LH): CPT

## 2023-09-11 PROCEDURE — 83001 ASSAY OF GONADOTROPIN (FSH): CPT

## 2023-09-12 LAB
ESTRADIOL SERPL-MCNC: 13.5 PG/ML
FSH SERPL-ACNC: 64.2 MIU/ML
LH SERPL-ACNC: 34.3 IU/L

## 2023-09-26 ENCOUNTER — IMMUNIZATION (OUTPATIENT)
Dept: OCCUPATIONAL MEDICINE | Facility: CLINIC | Age: 48
End: 2023-09-26

## 2023-09-26 DIAGNOSIS — Z23 NEED FOR VACCINATION: Primary | ICD-10-CM

## 2023-09-26 PROCEDURE — 90686 IIV4 VACC NO PRSV 0.5 ML IM: CPT | Performed by: PREVENTIVE MEDICINE

## 2023-10-23 ENCOUNTER — EH NON-PROVIDER (OUTPATIENT)
Dept: OCCUPATIONAL MEDICINE | Facility: CLINIC | Age: 48
End: 2023-10-23

## 2023-10-23 DIAGNOSIS — Z02.89 ENCOUNTER FOR OCCUPATIONAL HEALTH ASSESSMENT: ICD-10-CM

## 2023-10-23 PROCEDURE — 94375 RESPIRATORY FLOW VOLUME LOOP: CPT | Performed by: PREVENTIVE MEDICINE

## 2023-11-20 ENCOUNTER — OFFICE VISIT (OUTPATIENT)
Dept: PHYSICAL MEDICINE AND REHAB | Facility: MEDICAL CENTER | Age: 48
End: 2023-11-20
Payer: COMMERCIAL

## 2023-11-20 VITALS
WEIGHT: 233.69 LBS | BODY MASS INDEX: 37.72 KG/M2 | OXYGEN SATURATION: 98 % | TEMPERATURE: 97.1 F | DIASTOLIC BLOOD PRESSURE: 94 MMHG | SYSTOLIC BLOOD PRESSURE: 132 MMHG | HEART RATE: 61 BPM

## 2023-11-20 DIAGNOSIS — M47.816 LUMBAR SPONDYLOSIS: ICD-10-CM

## 2023-11-20 DIAGNOSIS — G89.29 CHRONIC BILATERAL LOW BACK PAIN WITHOUT SCIATICA: ICD-10-CM

## 2023-11-20 DIAGNOSIS — M54.50 CHRONIC BILATERAL LOW BACK PAIN WITHOUT SCIATICA: ICD-10-CM

## 2023-11-20 DIAGNOSIS — M54.16 LUMBAR RADICULOPATHY: ICD-10-CM

## 2023-11-20 DIAGNOSIS — G89.29 CHRONIC HIP PAIN, UNSPECIFIED LATERALITY: ICD-10-CM

## 2023-11-20 DIAGNOSIS — G89.29 CHRONIC BILATERAL THORACIC BACK PAIN: ICD-10-CM

## 2023-11-20 DIAGNOSIS — M54.6 CHRONIC BILATERAL THORACIC BACK PAIN: ICD-10-CM

## 2023-11-20 DIAGNOSIS — M25.559 CHRONIC HIP PAIN, UNSPECIFIED LATERALITY: ICD-10-CM

## 2023-11-20 PROCEDURE — 3080F DIAST BP >= 90 MM HG: CPT | Performed by: PHYSICAL MEDICINE & REHABILITATION

## 2023-11-20 PROCEDURE — 99214 OFFICE O/P EST MOD 30 MIN: CPT | Performed by: PHYSICAL MEDICINE & REHABILITATION

## 2023-11-20 PROCEDURE — 3075F SYST BP GE 130 - 139MM HG: CPT | Performed by: PHYSICAL MEDICINE & REHABILITATION

## 2023-11-20 PROCEDURE — 1125F AMNT PAIN NOTED PAIN PRSNT: CPT | Performed by: PHYSICAL MEDICINE & REHABILITATION

## 2023-11-20 RX ORDER — GABAPENTIN 300 MG/1
300-600 CAPSULE ORAL 3 TIMES DAILY PRN
Qty: 180 CAPSULE | Refills: 5 | Status: SHIPPED | OUTPATIENT
Start: 2023-11-20

## 2023-11-20 RX ORDER — TIZANIDINE 4 MG/1
4 TABLET ORAL NIGHTLY PRN
Qty: 30 TABLET | Refills: 5 | Status: SHIPPED | OUTPATIENT
Start: 2023-11-20

## 2023-11-20 ASSESSMENT — PAIN SCALES - GENERAL: PAINLEVEL: 2=MINIMAL-SLIGHT

## 2023-11-20 ASSESSMENT — PATIENT HEALTH QUESTIONNAIRE - PHQ9: CLINICAL INTERPRETATION OF PHQ2 SCORE: 0

## 2023-11-20 ASSESSMENT — FIBROSIS 4 INDEX: FIB4 SCORE: 1.27

## 2023-11-20 NOTE — Clinical Note
Dear Mariah Christensen P.A.-C. ,   Thank you for the referral of Balbina Randall. Balbina had a significant improvement in pain and function after the epidural steroid injection and with her home exercise program.  She is continuing with improved exercise, diet and weight loss.  I renewed her gabapentin and Zanaflex today.  I am referring the patient back to you as she is now stable.  Future medication renewals were sent to you as well.  The patient can follow-up with me as needed.  Please see my note for more details    Should you have any questions or concerns please do not hesitate to contact me.   Davide Bartholomew M.D.

## 2023-11-20 NOTE — PROGRESS NOTES
Follow up patient note  Interventional spine and Pain  Physiatry (physical medicine and  Rehabilitation)       Chief complaint:   Chief Complaint   Patient presents with    Follow-Up     Lumbar spondylosis          HISTORY    Please see new patient note by Dr Bartholomew,  for more details.     HPI  Patient identification: Balbina Randall ,  1975,   With Diagnoses of Chronic bilateral thoracic back pain, Chronic bilateral low back pain without sciatica, Lumbar radiculopathy, Lumbar spondylosis, Chronic hip pain, Lumbar radiculopathy, Lumbar spondylosis, and Chronic hip pain were pertinent to this visit.       Procedure:  2022 left L3-4 and L4-5 transforaminal epidural steroid injection.  60% improved postprocedure   2022 medial branch block starting the bilateral L3-4 and L4-5 facet joints 100% pain relief during the diagnostic phase  2022 medial branch block starting the bilateral L3-4 and L4-5 facet joints 100% pain relief during the diagnostic phase  2022 medial branch radiofrequency neurotomy targeting the left L3-4, L4-5, L5-S1 facet joint.  80% pain relief  10/12/2022 medial branch radiofrequency neurotomy targeting the right L3-4, L4-5, L5-S1 facet joint.  80% pain relief    Overall the patient is doing quite well.  Pain is stable and well-controlled.  Pain is 2 out of 10 in intensity.  Intermittent.  The patient has been going to the gym doing elliptical machines and exercising.  She has been doing weight watchers.  She is intentionally lost approximately 30 pounds when compared to last year.  She is progressing with diet and exercise.  She does have intermittent numbness and tingling in the low back region which is typically worse when she is resting and at night this can affect numbness in the legs bilaterally.  This does not bother her during the day or with activity.  She takes gabapentin intermittently which does help with this area.  She also has burning and numbness in the  lower thoracic region.  No pain or injury in this area.  Denies radiating pain.      She has a home exercise program from physical therapy as well as a comprehensive pain program.        Medications tried include toradol, zanaflex, percocet, oral steroids, mobic.          ROS Red Flags :   Fever, Chills, Sweats: Denies  Involuntary Weight Loss: Denies  Bowel/Bladder Incontinence: Denies  Saddle Anesthesia: Denies        PMHx:   Past Medical History:   Diagnosis Date    Airway problem 1/19/15    abnormal airway assessment as noted on procedure orders per MD (Dr. Serrato)    Heart burn     Indigestion     Other specified symptom associated with female genital organs     ovarian cyst, adhesions    Harleen-Parkinson-White syndrome     WPW (Harleen-Parkinson-White syndrome) since birth    cardiologist, Dr. So       PSHx:   Past Surgical History:   Procedure Laterality Date    DESTRUCTION, NERVE, USING NEUROLYTIC AGENT Right 10/12/2022    Procedure: RIGHT radiofrequency neurotomies medial branch targeting rhe L3-4 and L4-5 facet joints with fluoroscopic guidance and sedation;  Surgeon: Davide Bartholomew M.D.;  [facility];  Service: Pain Management    RADIO FREQUENCY ABLATION ADDITIONAL LEVEL Left 9/28/2022    Procedure: LEFT radiofrequency neurotomies medial branch targeting the L3-4 and L4-5 facet joints with fluorsoscopic guidance and sedation;  Surgeon: Davide Bartholomew M.D.;  [facility];  Service: Pain Management    LUMBAR MEDIAL BRANCH BLOCKS Bilateral 9/7/2022    Procedure: Diagnostic medial branch blocks targeting the BILATERAL L3-4 and L4-5 facet joints with fluoroscopic guidance #2;  Surgeon: Davide Bartholomew M.D.;  [facility];  Service: Pain Management    LUMBAR MEDIAL BRANCH BLOCKS Bilateral 8/31/2022    Procedure: Diagnostic media branch blocks targeting the BILATERAL L3-4 and L4-5 facet joints with fluoroscopic guidance;   Surgeon: Davide Bartholomew M.D.;  [facility];  Service: Pain Management    LUMBAR TRANSFORAMINAL EPIDURAL STEROID INJECTION Left 4/19/2022    Procedure: LEFT L3-4 and L4-5 transforaminal epidural steroid injection with fluoroscopic guidance;  Surgeon: Davide Bartholomew M.D.;  [facility];  Service: Pain Management    BLOCK EPIDURAL STEROID INJECTION Left 4/19/2022    Procedure: .;  Surgeon: Davide Bartholomew M.D.;  [facility];  Service: Pain Management    WV SHLDR ARTHROSCOP,PART ACROMIOPLAS Right 1/29/2020    Procedure: DECOMPRESSION, SHOULDER, ARTHROSCOPIC - SUBACROMIAL, EXTENSIVE DEBRIDEMENT;  Surgeon: Robert Park M.D.;  Location: [facility];  Service: Orthopedics    CLAVICLE DISTAL EXCISION Right 1/29/2020    Procedure: EXCISION, CLAVICLE, DISTAL;  Surgeon: Robert Park M.D.;  Location: [facility];  Service: Orthopedics    ROTATOR CUFF REPAIR Right 1/29/2020    Procedure: REPAIR, ROTATOR CUFF;  Surgeon: Robert Park M.D.;  Location: [facility];  Service: Orthopedics    HYSTEROSCOPY NOVASURE-2  4/27/2017    Procedure: HYSTEROSCOPY NOVASURE;  Surgeon: Lindsay Webb M.D.;  Location: [facility];  Service:     WRIST ARTHROSCOPY Right 1/13/2016    Procedure: WRIST ARTHROSCOPY with debridment  ;  Surgeon: Jose Daniel Redmond M.D.;  Location: [facility];  Service:     LIGAMENT REPAIR  1/13/2016    Procedure: LIGAMENT REPAIR;  Surgeon: Jose Daniel Redmond M.D.;  Location: [facility];  Service:     COLONOSCOPY WITH BIOPSY  1/26/2015    Performed by Mauricio Serrato M.D. at [facility]    GASTROSCOPY WITH BIOPSY  1/26/2015    Performed by Mauricio Serrato M.D. at [facility]    HYSTEROSCOPY WITH VIDEO DIAGNOSTIC  5/24/2013    Performed by Robert Rosario M.D. at [facility]    KIRT BY  "LAPAROSCOPY  9/2/2010    Performed by KIM ZHAO at SURGERY [facility]    PRIMARY C SECTION  4/30/2010    Performed by DAXA MILLAN at [facility]    BOWEL RESECTION  1992    \"adhesions\"    APPENDECTOMY  1992    LAPAROSCOPY  1992, 2002    OTHER      kenalog inj left foot       Family history   Family History   Problem Relation Age of Onset    Hypertension Other     Cancer Other          Medications:   Outpatient Medications Marked as Taking for the 11/20/23 encounter (Office Visit) with Davide Bartholomew M.D.   Medication Sig Dispense Refill    gabapentin (NEURONTIN) 300 MG Cap Take 1-2 Capsules by mouth 3 times a day as needed (pain). 180 Capsule 5    tizanidine (ZANAFLEX) 4 MG Tab Take 1 Tablet by mouth at bedtime as needed (pain). 30 Tablet 5    Cholecalciferol 40242 UNIT Cap Take 1 Capsule by mouth every 7 days. 12 Capsule 3    estradiol (VAGIFEM) 10 MCG Tab insert one tablet vaginally two times a week      acetaminophen (TYLENOL) 500 MG Tab Take 2 Tablets by mouth 3 times a day as needed (pain.  Do not exceed 3000 mg of total acetaminophen per day). 180 Tablet 6    sucralfate (CARAFATE) 1 GM/10ML Suspension       pantoprazole (PROTONIX) 40 MG Tablet Delayed Response 40 mg every day.          Current Outpatient Medications on File Prior to Visit   Medication Sig Dispense Refill    Cholecalciferol 40830 UNIT Cap Take 1 Capsule by mouth every 7 days. 12 Capsule 3    estradiol (VAGIFEM) 10 MCG Tab insert one tablet vaginally two times a week      acetaminophen (TYLENOL) 500 MG Tab Take 2 Tablets by mouth 3 times a day as needed (pain.  Do not exceed 3000 mg of total acetaminophen per day). 180 Tablet 6    sucralfate (CARAFATE) 1 GM/10ML Suspension       pantoprazole (PROTONIX) 40 MG Tablet Delayed Response 40 mg every day.      FLOWFLEX COVID-19 AG HOME TEST Kit ADMINISTER AS PER PROTOCOL (Patient not taking: Reported on 4/17/2023)       No current facility-administered medications on file prior to " visit.         Allergies:   Allergies   Allergen Reactions    Other Misc      Additive In Estrogen Im    Other reaction(s): Rash    Sulfa Drugs Rash and Itching     IFA=[id number]  Other reaction(s): Rash, itching    Latex Rash     [id number]    Other Drug Rash     Allergic to Estrogen IM - preservative within medication  IYN=[id number]    Wound Dressing Adhesive Hives     Band-aids cause rash and itching       Social Hx:   Social History     Socioeconomic History    Marital status:      Spouse name: Not on file    Number of children: Not on file    Years of education: Not on file    Highest education level: Associate degree: academic program   Occupational History    Not on file   Tobacco Use    Smoking status: Never    Smokeless tobacco: Never   Vaping Use    Vaping Use: Never used   Substance and Sexual Activity    Alcohol use: Yes     Alcohol/week: 0.0 oz     Comment: 3-4 month    Drug use: No    Sexual activity: Not on file   Other Topics Concern    Not on file   Social History Narrative    Not on file     Social Determinants of Health     Financial Resource Strain: Low Risk  (12/17/2022)    Overall Financial Resource Strain (CARDIA)     Difficulty of Paying Living Expenses: Not very hard   Food Insecurity: No Food Insecurity (12/17/2022)    Hunger Vital Sign     Worried About Running Out of Food in the Last Year: Never true     Ran Out of Food in the Last Year: Never true   Transportation Needs: No Transportation Needs (12/17/2022)    PRAPARE - Transportation     Lack of Transportation (Medical): No     Lack of Transportation (Non-Medical): No   Physical Activity: Insufficiently Active (12/17/2022)    Exercise Vital Sign     Days of Exercise per Week: 2 days     Minutes of Exercise per Session: 60 min   Stress: Stress Concern Present (12/17/2022)    [employer] - Occupational Stress Questionnaire     Feeling of Stress : To some extent   Social Connections: Socially Isolated (12/17/2022)     Social Connection and Isolation Panel [NHANES]     Frequency of Communication with Friends and Family: More than three times a week     Frequency of Social Gatherings with Friends and Family: Twice a week     Attends Nondenominational Services: Never     Active Member of Clubs or Organizations: No     Attends Club or Organization Meetings: Never     Marital Status:    Intimate Partner Violence: Not on file   Housing Stability: High Risk (12/17/2022)    Housing Stability Vital Sign     Unable to Pay for Housing in the Last Year: Yes     Number of Places Lived in the Last Year: 1     Unstable Housing in the Last Year: No         EXAMINATION     Physical Exam:   Vitals: BP (!) 132/94 (BP Location: Left arm, Patient Position: Sitting, BP Cuff Size: Adult)   Pulse 61   Temp 36.2 °C (97.1 °F) (Temporal)   Wt 106 kg (233 lb 11 oz)   SpO2 98%     Constitutional:   Body Habitus: Body mass index is 37.72 kg/m².  Cooperation: Fully cooperates with exam  Appearance: Well-groomed no disheveled    Respiratory-  breathing comfortable on room air, no audible wheezing  Cardiovascular- capillary refills less than 2 seconds. No lower extremity edema is noted.   Psychiatric- alert and oriented ×3. Normal affect.    MSK and Neuro: -      Thoracic/Lumbar Spine/Sacral Spine/Hips   There are no signs of infection around the injection sites.   full  active range of motion with flexion, lateral flexion, and rotation bilaterally.   There is full  active range of motion with lumbar extension.    There is no  pain with lumbar extension.   There is no pain with facet loading maneuver (extension rotation) with axial low back pain on the BILATERAL side(s)       Palpation:   No tenderness to palpation in midline at T1-T12 levels. No tenderness to palpation in the left and right of the midline T1-L5, NEGATIVE for tenderness to palpation to the para-midline region in the lower lumbar levels.  palpation over SI joint: negative bilaterally     palpation in hip or over the gluteus medius tendon insertion: negative bilaterally      Lumbar spine Special tests  Neuro tension  Straight leg test negative bilaterally    Slump test negative bilaterally      Key points for the international standards for neurological classification of spinal cord injury (ISNCSCI) to light touch.     Dermatome R L                                      L2 2 2   L3 2 2   L4 2 2   L5 2 2   S1 2 2   S2 2 2         Motor Exam Lower Extremities    ? Myotome R L   Hip flexion L2 5 5   Knee extension L3 5 5   Ankle dorsiflexion L4 5 5   Toe extension L5 5 5   Ankle plantarflexion S1 5 5             MEDICAL DECISION MAKING    DATA    Labs:   Lab Results   Component Value Date/Time    SODIUM 143 01/24/2023 10:25 AM    POTASSIUM 4.1 01/24/2023 10:25 AM    CHLORIDE 105 01/24/2023 10:25 AM    CO2 24 01/24/2023 10:25 AM    GLUCOSE 83 01/24/2023 10:25 AM    BUN 16 01/24/2023 10:25 AM    CREATININE 0.83 01/24/2023 10:25 AM        Lab Results   Component Value Date/Time    PROTHROMBTM 12.8 05/18/2012 08:57 AM    INR 0.95 05/18/2012 08:57 AM        Lab Results   Component Value Date/Time    WBC 7.4 01/24/2023 10:25 AM    RBC 4.84 01/24/2023 10:25 AM    HEMOGLOBIN 14.8 01/24/2023 10:25 AM    HEMATOCRIT 45.8 01/24/2023 10:25 AM    MCV 94.6 01/24/2023 10:25 AM    MCH 30.6 01/24/2023 10:25 AM    MCHC 32.3 (L) 01/24/2023 10:25 AM    MPV 12.4 01/24/2023 10:25 AM    NEUTSPOLYS 53.20 01/24/2023 10:25 AM    LYMPHOCYTES 35.00 01/24/2023 10:25 AM    MONOCYTES 7.00 01/24/2023 10:25 AM    EOSINOPHILS 3.80 01/24/2023 10:25 AM    BASOPHILS 0.50 01/24/2023 10:25 AM        Lab Results   Component Value Date/Time    HBA1C 5.6 01/24/2023 10:25 AM          Imaging:   I personally reviewed following images    MRI lumbar spine 4/14/2022  There are no areas of high-grade neuroforaminal or central canal stenosis.  There is facet arthropathy worst on the left at L3-4, L4-5, L5-S1.  Degenerative disc disease at L4-5 and  L5-S1.  High intensity zone's at the left paracentric region at L4-5 and L5-S1 which can be consistent with an annular tear.      MRI lumbar spine 7/27/2015  No areas of high-grade central stenosis.  Tiny disc protrusions at L4-5 and L5-S1.    I reviewed the following radiology reports    MRI left hip 7/27/2015  IMPRESSION:   1.  Gluteal tendinopathy and greater trochanteric bursitis.  2.  Ischia femoral impingement.   3.  3.9 cm left ovarian cyst.           Results for orders placed during the hospital encounter of 04/14/22    MR-LUMBAR SPINE-W/O    Impression  1.  Mild degenerative disc disease at L4-5 and L5-S1 as described. Central annular fissure posterior disc is unchanged at both levels.    2.  Facet arthropathy at L3-L4, left greater than right.                                                        Results for orders placed during the hospital encounter of 04/23/21    CT-CHEST (THORAX) WITH    Impression  1.  No evidence of focal consolidation or pleural effusion.    2.  No evidence of mediastinal or hilar adenopathy.    3.  Fatty liver.    4.  Prior cholecystectomy.            Results for orders placed during the hospital encounter of 05/02/18    CT-ABDOMEN WITH & W/O    Impression  Unremarkable CT scan of the abdomen with attention to the pancreas.    Status post cholecystectomy.       Results for orders placed during the hospital encounter of 07/01/16    CT-ABDOMEN-PELVIS WITH    Impression  Unremarkable contrast-enhanced CT of the abdomen and pelvis though appendix not visualized.                   Results for orders placed during the hospital encounter of 10/03/14    DX-ANKLE 3+ VIEWS    Impression  1.  Soft tissue swelling with no acute fracture.  2.  Mild degenerative changes talonavicular joint and possible focal area of osteonecrosis articular surface talus.        Results for orders placed in visit on 11/08/21    DX-ELBOW-LIMITED 2- LEFT             Results for orders placed during the hospital  encounter of 04/07/15    DX-HIP-COMPLETE - UNILATERAL 2+    Impression  Unremarkable examination of the left hip.   Results for orders placed during the hospital encounter of 09    DX-HIPS-COMPLETE - BILATERAL 3+    Impression  IMPRESSION:    NEGATIVE EXAMINATION OF BOTH HIPS.    SST:dxm      Read By MISHA FIGUEROA MD on 2009  4:37PM  : DXM Transcription Date: 2009  8:27PM  THIS DOCUMENT HAS BEEN ELECTRONICALLY SIGNED BY: MISHA FIGUEROA MD on  2009  2:34PM        Results for orders placed during the hospital encounter of 22    DX-LUMBAR SPINE-2 OR 3 VIEWS    Impression  1.  Minimal retrolisthesis of L1 on L2.  2.  Mild degenerative changes at L5/S1.  3.  Mild facet arthropathy in the lower lumbar spine.              Results for orders placed during the hospital encounter of 09    DX-THORACIC SPINE-WITH SWIMMERS VIEW    Impression  IMPRESSION:    1. MINIMAL ANTERIOR ENDPLATE OSTEOPHYTOSIS INFERIORLY.  OTHERWISE  NEGATIVE EXAMINATION OF THE THORACIC SPINE.    SST:dxm       Results for orders placed during the hospital encounter of 04/20/15    DX-WRIST-LIMITED 2-    Impression  No acute osseous abnormality is identified.        DIAGNOSIS   Visit Diagnoses     ICD-10-CM   1. Chronic bilateral thoracic back pain  M54.6    G89.29   2. Chronic bilateral low back pain without sciatica  M54.50    G89.29   3. Lumbar radiculopathy  M54.16   4. Lumbar spondylosis  M47.816   5. Chronic hip pain  M25.559    G89.29   6. Lumbar radiculopathy  M54.16   7. Lumbar spondylosis  M47.816   8. Chronic hip pain  M25.559    G89.29           ASSESSMENT and PLAN:     Balbina Russkellie  1975 female      Balbina was seen today for follow-up.    Diagnoses and all orders for this visit:    Chronic bilateral thoracic back pain  -     Referral to Chiropractic  -     gabapentin (NEURONTIN) 300 MG Cap; Take 1-2 Capsules by mouth 3 times a day as needed (pain).  -     tizanidine  (ZANAFLEX) 4 MG Tab; Take 1 Tablet by mouth at bedtime as needed (pain).    Chronic bilateral low back pain without sciatica  -     Referral to Chiropractic  -     gabapentin (NEURONTIN) 300 MG Cap; Take 1-2 Capsules by mouth 3 times a day as needed (pain).  -     tizanidine (ZANAFLEX) 4 MG Tab; Take 1 Tablet by mouth at bedtime as needed (pain).    Lumbar radiculopathy  Comments:  Stable, improved after epidural steroid injection.  Orders:  -     gabapentin (NEURONTIN) 300 MG Cap; Take 1-2 Capsules by mouth 3 times a day as needed (pain).  -     tizanidine (ZANAFLEX) 4 MG Tab; Take 1 Tablet by mouth at bedtime as needed (pain).    Lumbar spondylosis  Comments:  stable, significantly improved pain and function after the radio frequency neurotomy  Orders:  -     gabapentin (NEURONTIN) 300 MG Cap; Take 1-2 Capsules by mouth 3 times a day as needed (pain).  -     tizanidine (ZANAFLEX) 4 MG Tab; Take 1 Tablet by mouth at bedtime as needed (pain).    Chronic hip pain  Comments:  Stable, continue home exercise program  Orders:  -     gabapentin (NEURONTIN) 300 MG Cap; Take 1-2 Capsules by mouth 3 times a day as needed (pain).  -     tizanidine (ZANAFLEX) 4 MG Tab; Take 1 Tablet by mouth at bedtime as needed (pain).    Lumbar radiculopathy  Comments:  Stable, improved after epidural steroid injection  Orders:  -     gabapentin (NEURONTIN) 300 MG Cap; Take 1-2 Capsules by mouth 3 times a day as needed (pain).  -     tizanidine (ZANAFLEX) 4 MG Tab; Take 1 Tablet by mouth at bedtime as needed (pain).    Lumbar spondylosis  Comments:  stable, continue home exercise program  Orders:  -     gabapentin (NEURONTIN) 300 MG Cap; Take 1-2 Capsules by mouth 3 times a day as needed (pain).  -     tizanidine (ZANAFLEX) 4 MG Tab; Take 1 Tablet by mouth at bedtime as needed (pain).    Chronic hip pain  -     gabapentin (NEURONTIN) 300 MG Cap; Take 1-2 Capsules by mouth 3 times a day as needed (pain).  -     tizanidine (ZANAFLEX) 4 MG Tab;  Take 1 Tablet by mouth at bedtime as needed (pain).        The above issues are stable.    Medications: Continue Zanaflex, gabapentin, Tylenol as needed.  Patient is using these intermittently.  I renewed the prescriptions today.  Future prescriptions per PCP Mariah Christensen P.A.-C.       Follow up: As needed with me    Thank you for allowing me to participate in the care of this patient. If you have any questions please not hesitate to contact me.             Please note that this dictation was created using voice recognition software. I have made every reasonable attempt to correct obvious errors but there may be errors of grammar and content that I may have overlooked prior to finalization of this note.      Davide Bartholomew MD  Interventional Spine and Sports Physiatry  Physical Medicine and Rehabilitation  [facility]

## 2023-12-11 ENCOUNTER — HOSPITAL ENCOUNTER (OUTPATIENT)
Dept: RADIOLOGY | Facility: MEDICAL CENTER | Age: 48
End: 2023-12-11
Attending: OBSTETRICS & GYNECOLOGY
Payer: COMMERCIAL

## 2023-12-11 PROCEDURE — 77063 BREAST TOMOSYNTHESIS BI: CPT

## 2023-12-22 ENCOUNTER — OFFICE VISIT (OUTPATIENT)
Dept: URGENT CARE | Facility: PHYSICIAN GROUP | Age: 48
End: 2023-12-22
Payer: COMMERCIAL

## 2023-12-22 VITALS
OXYGEN SATURATION: 96 % | HEART RATE: 68 BPM | RESPIRATION RATE: 16 BRPM | TEMPERATURE: 97.7 F | WEIGHT: 233.69 LBS | DIASTOLIC BLOOD PRESSURE: 76 MMHG | SYSTOLIC BLOOD PRESSURE: 124 MMHG | BODY MASS INDEX: 37.56 KG/M2 | HEIGHT: 66 IN

## 2023-12-22 DIAGNOSIS — R19.7 DIARRHEA, UNSPECIFIED TYPE: ICD-10-CM

## 2023-12-22 DIAGNOSIS — R11.0 NAUSEA: ICD-10-CM

## 2023-12-22 DIAGNOSIS — K21.9 GASTROESOPHAGEAL REFLUX DISEASE, UNSPECIFIED WHETHER ESOPHAGITIS PRESENT: ICD-10-CM

## 2023-12-22 DIAGNOSIS — Z87.19 HISTORY OF ESOPHAGEAL STRICTURE: ICD-10-CM

## 2023-12-22 PROCEDURE — 99213 OFFICE O/P EST LOW 20 MIN: CPT

## 2023-12-22 PROCEDURE — 3074F SYST BP LT 130 MM HG: CPT

## 2023-12-22 PROCEDURE — 3078F DIAST BP <80 MM HG: CPT

## 2023-12-22 RX ORDER — ONDANSETRON 4 MG/1
4 TABLET, FILM COATED ORAL EVERY 4 HOURS PRN
Qty: 20 TABLET | Refills: 0 | Status: SHIPPED | OUTPATIENT
Start: 2023-12-22 | End: 2024-01-11

## 2023-12-22 ASSESSMENT — FIBROSIS 4 INDEX: FIB4 SCORE: 1.27

## 2023-12-23 NOTE — PROGRESS NOTES
Subjective     Balbina Randall is a 48 y.o. female who presents with GI Problem (Since Sunday has been have stomach issues, diarrhea, when eating food feels like it gets stuck, nausea, can't hardly eat anything, )            HPI patient states she has a history of GERD, she takes Protonix daily  She also has a history of esophageal stricture, she had this dilated either late last year or early of this year.  She was also seen last November for an esophageal scope and barium swallow.    She states for the past several months she has felt like her esophageal stricture is returning  Then this Sunday morning she started feeling sick with a burning in her stomach  By that night she said she started to have diarrhea  She continued to feel sick to her stomach, which affected her appetite and continue to have watery diarrhea through Tuesday night.  Wednesday morning she thought she was feeling a little bit better  She had continued to have intermittent feelings of nausea and stomach cramping  She ate some rice and chicken and did have a soft bowel movement that day  She then had red pepper bisque soup today which again caused her to have an upset stomach and feel like she is not digesting her meal properly, she has been feeling gassy  She denies any blood or mucus in the stool  She denies any vomiting  She denies any fevers    She states she has been told by GI that she can double her Protonix to twice daily when needed, she has not done this recently  The last time she doubled her dose was this summer    ROS  Same as above        Allergies   Allergen Reactions    Other Misc      Additive In Estrogen Im    Other reaction(s): Rash    Sulfa Drugs Rash and Itching     GWS=2638  Other reaction(s): Rash, itching    Latex Rash     SFW=1895    Other Drug Rash     Allergic to Estrogen IM - preservative within medication  UXG=0766    Wound Dressing Adhesive Hives     Band-aids cause rash and itching       Current Outpatient  "Medications   Medication Sig    ondansetron (ZOFRAN) 4 MG Tab tablet Take 1 Tablet by mouth every four hours as needed for Nausea/Vomiting for up to 20 days.    meloxicam (MOBIC) 15 MG tablet Take 1 Tablet by mouth every day for 120 days.    methylPREDNISolone (MEDROL DOSEPAK) 4 MG Tablet Therapy Pack Follow schedule on package instructions.    gabapentin (NEURONTIN) 300 MG Cap Take 1-2 Capsules by mouth 3 times a day as needed (pain).    tizanidine (ZANAFLEX) 4 MG Tab Take 1 Tablet by mouth at bedtime as needed (pain).    FLOWFLEX COVID-19 AG HOME TEST Kit ADMINISTER AS PER PROTOCOL (Patient not taking: Reported on 4/17/2023)    Cholecalciferol 77614 UNIT Cap Take 1 Capsule by mouth every 7 days.    estradiol (VAGIFEM) 10 MCG Tab insert one tablet vaginally two times a week    acetaminophen (TYLENOL) 500 MG Tab Take 2 Tablets by mouth 3 times a day as needed (pain.  Do not exceed 3000 mg of total acetaminophen per day).    sucralfate (CARAFATE) 1 GM/10ML Suspension     pantoprazole (PROTONIX) 40 MG Tablet Delayed Response 40 mg every day.        Past Medical History:   Diagnosis Date    Airway problem 1/19/15    abnormal airway assessment as noted on procedure orders per MD (Dr. Serrato)    Heart burn     Indigestion     Other specified symptom associated with female genital organs     ovarian cyst, adhesions    Hraleen-Parkinson-White syndrome     WPW (Harleen-Parkinson-White syndrome) since birth    cardiologist, Dr. So        Objective     /76 (BP Location: Left arm, Patient Position: Sitting, BP Cuff Size: Adult)   Pulse 68   Temp 36.5 °C (97.7 °F) (Temporal)   Resp 16   Ht 1.676 m (5' 6\")   Wt 106 kg (233 lb 11 oz)   SpO2 96%   BMI 37.72 kg/m²      Physical Exam  Vitals reviewed.   Constitutional:       Appearance: Normal appearance. She is not ill-appearing.   HENT:      Head: Normocephalic and atraumatic.      Nose: Nose normal.      Mouth/Throat:      Mouth: Mucous membranes are moist. "   Eyes:      Conjunctiva/sclera: Conjunctivae normal.   Cardiovascular:      Rate and Rhythm: Normal rate.   Pulmonary:      Effort: Pulmonary effort is normal.   Abdominal:      General: Bowel sounds are normal.      Palpations: Abdomen is soft.      Tenderness: There is generalized abdominal tenderness and tenderness in the epigastric area. There is no guarding or rebound. Negative signs include Frazier's sign and McBurney's sign.   Musculoskeletal:         General: Normal range of motion.      Cervical back: Normal range of motion and neck supple.   Skin:     General: Skin is warm and dry.   Neurological:      Mental Status: She is alert and oriented to person, place, and time.         Assessment & Plan      Patient comes in with a history of GERD and esophageal stricture.  She started having feelings of stomach upset, burning on Sunday.  She then began to have intermittent diarrhea and nausea.  This continued through Tuesday night.  Wednesday she was able to eat some chicken and rice and had a soft bowel movement.  She has not had any blood or mucus in the stool.  She does continue to have epigastric and abdominal discomfort.  She does feel that her esophageal stricture is returning, this was dilated either late last year early this year per her report.  She has not seen GI since then.    On exam there is normal bowel sounds in all 4 quadrants.  There is generalized abdominal tenderness, especially in the epigastric area.  She has negative Frazier's and McBurney sign.  There is no guarding, no rebound, no peritoneal signs.  We did discuss placing an urgent referral in order to assist her with follow-up with GI for possible assessment of return of her esophageal stricture.  We will also call in Zofran to assist with her nausea.  We discussed bland diet, no peppers, no spices, no greasy foods. Differential diagnosis, natural history, supportive care, and indications for immediate follow-up were discussed.  Patient  verbalized understanding agreement with plan of care.        1. Nausea  ondansetron (ZOFRAN) 4 MG Tab tablet    Referral to Gastroenterology      2. Diarrhea, unspecified type  Referral to Gastroenterology      3. Gastroesophageal reflux disease, unspecified whether esophagitis present  Referral to Gastroenterology      4. History of esophageal stricture  Referral to Gastroenterology

## 2024-02-05 DIAGNOSIS — E55.9 VITAMIN D DEFICIENCY: ICD-10-CM

## 2024-02-05 NOTE — TELEPHONE ENCOUNTER
Received request via: Pharmacy    Was the patient seen in the last year in this department? Yes    Does the patient have an active prescription (recently filled or refills available) for medication(s) requested? No    Pharmacy Name:     [facility] - [place], [place] - [address] [facility] [address]       Does the patient have intermediate Plus and need 100 day supply (blood pressure, diabetes and cholesterol meds only)? Patient does not have SCP

## 2024-02-06 RX ORDER — CHOLECALCIFEROL (VITAMIN D3) 1250 MCG
1 CAPSULE ORAL
Qty: 12 CAPSULE | Refills: 0 | Status: SHIPPED | OUTPATIENT
Start: 2024-02-06

## 2024-10-03 ENCOUNTER — IMMUNIZATION (OUTPATIENT)
Dept: OCCUPATIONAL MEDICINE | Facility: CLINIC | Age: 49
End: 2024-10-03

## 2024-10-03 DIAGNOSIS — Z23 NEED FOR VACCINATION: Primary | ICD-10-CM

## 2024-10-03 PROCEDURE — 90656 IIV3 VACC NO PRSV 0.5 ML IM: CPT | Performed by: PREVENTIVE MEDICINE

## 2025-01-16 NOTE — LETTER
April 12, 2022    To Whom It May Concern:         This is confirmation that Balbina Brito Jbekellie attended her scheduled appointment with CHELSEY Ha on 4/12/22.   She may return to work 4/16/2022 or sooner if better.        If you have any questions please do not hesitate to call me at the phone number listed below.    Sincerely,          CARMELITA aH.  [phone]                   [Cooperative] : cooperative [Anxious] : anxious [Full] : full [Clear] : clear [Racing] : racing [Preoccupations/Ruminations] : preoccupations/ruminations [Average] : average [WNL] : within normal limits [Mild] : mild [Individual reports tobacco use during the last 30 days?] : Individual reports tobacco use during the last 30 days? Yes [Individual reports use of the following tobacco products during the last 30 days?] : Individual reports use of the following tobacco products during the last 30 days? Yes -  [Cigarettes] : Cigarettes [Individual reports current use of tobacco cessation medication or nicotine replacement therapy?] : Individual reports current use of tobacco cessation medication or nicotine replacement therapy? No [Was tobacco cessation medication and/or nicotine replacement therapy recommended?] : Was tobacco cessation medication and/or nicotine replacement therapy recommended? Yes [Does individual accept referral to MD for cessation medication or NRT?] : Does individual accept referral to MD for cessation medication or NRT? No

## 2025-01-27 ENCOUNTER — HOSPITAL ENCOUNTER (OUTPATIENT)
Dept: RADIOLOGY | Facility: MEDICAL CENTER | Age: 50
End: 2025-01-27
Attending: PHYSICIAN ASSISTANT
Payer: COMMERCIAL

## 2025-01-27 DIAGNOSIS — Z12.31 VISIT FOR SCREENING MAMMOGRAM: ICD-10-CM

## 2025-01-27 PROCEDURE — 77067 SCR MAMMO BI INCL CAD: CPT

## 2025-02-10 ENCOUNTER — APPOINTMENT (OUTPATIENT)
Dept: MEDICAL GROUP | Facility: PHYSICIAN GROUP | Age: 50
End: 2025-02-10
Payer: COMMERCIAL

## 2025-02-10 VITALS
RESPIRATION RATE: 16 BRPM | HEIGHT: 66 IN | WEIGHT: 247 LBS | BODY MASS INDEX: 39.7 KG/M2 | OXYGEN SATURATION: 98 % | DIASTOLIC BLOOD PRESSURE: 72 MMHG | TEMPERATURE: 96.7 F | HEART RATE: 55 BPM | SYSTOLIC BLOOD PRESSURE: 124 MMHG

## 2025-02-10 DIAGNOSIS — Z13.228 SCREENING FOR ENDOCRINE, METABOLIC AND IMMUNITY DISORDER: ICD-10-CM

## 2025-02-10 DIAGNOSIS — K21.00 GASTROESOPHAGEAL REFLUX DISEASE WITH ESOPHAGITIS, UNSPECIFIED WHETHER HEMORRHAGE: ICD-10-CM

## 2025-02-10 DIAGNOSIS — R10.9 ABDOMINAL CRAMPING: ICD-10-CM

## 2025-02-10 DIAGNOSIS — Z13.29 SCREENING FOR ENDOCRINE, METABOLIC AND IMMUNITY DISORDER: ICD-10-CM

## 2025-02-10 DIAGNOSIS — Z13.0 SCREENING FOR ENDOCRINE, METABOLIC AND IMMUNITY DISORDER: ICD-10-CM

## 2025-02-10 DIAGNOSIS — Z12.11 COLON CANCER SCREENING: ICD-10-CM

## 2025-02-10 PROCEDURE — 3074F SYST BP LT 130 MM HG: CPT | Performed by: PHYSICIAN ASSISTANT

## 2025-02-10 PROCEDURE — 99214 OFFICE O/P EST MOD 30 MIN: CPT | Performed by: PHYSICIAN ASSISTANT

## 2025-02-10 PROCEDURE — 3078F DIAST BP <80 MM HG: CPT | Performed by: PHYSICIAN ASSISTANT

## 2025-02-10 RX ORDER — PANTOPRAZOLE SODIUM 40 MG/1
40 TABLET, DELAYED RELEASE ORAL DAILY
Qty: 90 TABLET | Refills: 3 | Status: SHIPPED | OUTPATIENT
Start: 2025-02-10

## 2025-02-10 RX ORDER — HYOSCYAMINE SULFATE 0.12 MG/1
125 TABLET ORAL EVERY 4 HOURS PRN
Qty: 30 TABLET | Refills: 0 | Status: SHIPPED | OUTPATIENT
Start: 2025-02-10 | End: 2025-05-11

## 2025-02-10 ASSESSMENT — PATIENT HEALTH QUESTIONNAIRE - PHQ9: CLINICAL INTERPRETATION OF PHQ2 SCORE: 0

## 2025-02-10 NOTE — LETTER
[facility]  Mariah Christensen P.A.-C.  [address]  [place] [address]  Fax: [phone]   Authorization for Release/Disclosure of   Protected Health Information   Name: LARRY YOU : 1975 SSN: [id number]   Address: [address] [place] [address] [place] [address] Phone:    [phone] (home)    I authorize the entity listed below to release/disclose the PHI below to:   [facility]/Mariah Christensen P.A.-C. and Mariah Christensen P.A.-C.   Provider or Entity Name:  [facility]   Address   [facility], [place]            [address] [place] [address] Phone:  [phone]      Fax:       [phone]        Reason for request: continuity of care   Information to be released:    [ X ] LAST COLONOSCOPY,  including any PATH REPORT and follow-up  [ X ] LAST FIT/COLOGUARD RESULT [  ] LAST DEXA  [  ] LAST MAMMOGRAM  [  ] LAST PAP  [  ] LAST LABS [  ] RETINA EXAM REPORT  [  ] IMMUNIZATION RECORDS  [  ] Release all info      [  ] Check here and initial the line next to each item to release ALL health information INCLUDING  _____ Care and treatment for drug and / or alcohol abuse  _____ HIV testing, infection status, or AIDS  _____ Genetic Testing    DATES OF SERVICE OR TIME PERIOD TO BE DISCLOSED: _____________  I understand and acknowledge that:  * This Authorization may be revoked at any time by you in writing, except if your health information has already been used or disclosed.  * Your health information that will be used or disclosed as a result of you signing this authorization could be re-disclosed by the recipient. If this occurs, your re-disclosed health information may no longer be protected by State or Federal laws.  * You may refuse to sign this Authorization. Your refusal will not affect your ability to obtain treatment.  * This Authorization becomes effective upon signing and will  on (date) __________.      If no date is indicated, this Authorization will  one (1)  year from the signature date.    Name: Balbina Randall    Signature:   Date:     2/10/2025       PLEASE FAX REQUESTED RECORDS BACK TO: [phone]

## 2025-02-11 NOTE — PROGRESS NOTES
"Verbal consent was acquired by the patient to use [employer] ambient listening note generation during this visit     Subjective:     HPI:   History of Present Illness  The patient is a 50-year-old female presenting for medication refills.    She reports being in good health but requires refills of pantoprazole (Protonix) and hyoscyamine. Pantoprazole is taken daily to prevent severe gastroesophageal reflux and associated headaches. Hyoscyamine is used intermittently to manage abdominal spasms. Approximately two months ago, she utilized hyoscyamine daily for a week due to morning discomfort, but her symptoms have since stabilized. She prefers to have these medications available and has been without hyoscyamine for about a month, relying on over-the-counter alternatives. Her last consultation with a gastroenterologist was in 2023, with no further follow-up recommended. A colonoscopy performed in 2021 revealed polyps, and an upper endoscopy was also conducted. She underwent esophageal stricture dilation in December 2023, following a previous dilation procedure.    MEDICATIONS  Protonix, hyoscyamine    Health Maintenance: Completed    ROS:  Gen: no fevers/chills  Eyes: no changes in vision  ENT: no sore throat  Pulm: no sob, no cough  CV: no chest pain  : no dysuria  MSk: no myalgias  Skin: no rash  Neuro: no headaches    Objective:     Exam:  /72   Pulse (!) 55   Temp 35.9 °C (96.7 °F) (Temporal)   Resp 16   Ht 1.676 m (5' 6\")   Wt 112 kg (247 lb)   SpO2 98%   BMI 39.87 kg/m²  Body mass index is 39.87 kg/m².    PHYSICAL EXAM  Constitutional: Alert, no distress, well-groomed.  Skin: Warm, dry, good turgor, no rashes in visible areas.  Eye: Equal, round and reactive, conjunctiva clear, lids normal.  ENMT: Lips without lesions, good dentition, moist mucous membranes.  Neck: Trachea midline, no masses, no thyromegaly.  Respiratory: Unlabored respiratory effort, no cough.  MSK: Normal gait, moves all " extremities.  Neuro: Grossly non-focal.   Psych: Alert and oriented x3, normal affect and mood.    Results      Assessment & Plan:     1. Gastroesophageal reflux disease with esophagitis, unspecified whether hemorrhage  pantoprazole (PROTONIX) 40 MG Tablet Delayed Response      2. Abdominal cramping  hyoscyamine (LEVSIN) 0.125 MG tablet      3. Screening for endocrine, metabolic and immunity disorder  Comp Metabolic Panel    VITAMIN D,25 HYDROXY (DEFICIENCY)    Lipid Profile    TSH WITH REFLEX TO FT4    HEMOGLOBIN A1C    CBC WITHOUT DIFFERENTIAL      4. Colon cancer screening  Referral to Gastroenterology          Assessment & Plan  1. Gastroesophageal reflux disease (GERD): Chronic, Stable. Protonix used daily to manage symptoms.  - Renew prescription for Protonix   -S/p esophageal dilation x 2    2. Stomach spasms: Chronic, stable. Hyoscyamine used as needed.  - Renew prescription for hyoscyamine    Follow-up  - Ensure patient remains up to date with GI consultations and colonoscopy as needed.    PROCEDURE  Colonoscopy in 2021 revealed polyps. Upper endoscopy in 2021.      I spent a total of 25 minutes with record review, exam, communication with the patient, communication with other providers, and documentation of this encounter.    Please note that this dictation was created using voice recognition software. I have made every reasonable attempt to correct obvious errors, but I expect that there are errors of grammar and possibly content that I did not discover before finalizing the note.

## 2025-02-13 NOTE — Clinical Note
REFERRAL APPROVAL NOTICE         Sent on February 13, 2025                   Balbina Alisha Prakash  [address] [place] [address]                   Dear MsKeith Prakash,    After a careful review of the medical information and benefit coverage, [facility] has processed your referral. See below for additional details.    If applicable, you must be actively enrolled with your insurance for coverage of the authorized service. If you have any questions regarding your coverage, please contact your insurance directly.    REFERRAL INFORMATION   Referral #:  [id number]  Referred-To Provider    Referred-By Provider:  Gastroenterology    Mariah Christensen P.A.-C.   [facility]      [address]  [place] [address]  [phone] [address]  [place] [address]  [phone]    Referral Start Date:  02/10/2025  Referral End Date:   02/10/2026             SCHEDULING  If you do not already have an appointment, please call [phone] to make an appointment.     MORE INFORMATION  If you do not already have a [employer] account, sign up at: [email or web address]  You can access your medical information, make appointments, see lab results, billing information, and more.  If you have questions regarding this referral, please contact  the [facility] Referrals department at:             [phone]. Monday - Friday 8:00AM - 5:00PM.     Sincerely,    [facility]

## 2025-03-07 ENCOUNTER — HOSPITAL ENCOUNTER (OUTPATIENT)
Dept: RADIOLOGY | Facility: MEDICAL CENTER | Age: 50
End: 2025-03-07
Payer: COMMERCIAL

## 2025-03-07 ENCOUNTER — NON-PROVIDER VISIT (OUTPATIENT)
Dept: URGENT CARE | Facility: PHYSICIAN GROUP | Age: 50
End: 2025-03-07
Payer: COMMERCIAL

## 2025-03-07 ENCOUNTER — OCCUPATIONAL MEDICINE (OUTPATIENT)
Dept: URGENT CARE | Facility: PHYSICIAN GROUP | Age: 50
End: 2025-03-07
Payer: COMMERCIAL

## 2025-03-07 VITALS
DIASTOLIC BLOOD PRESSURE: 70 MMHG | HEIGHT: 66 IN | TEMPERATURE: 97.3 F | SYSTOLIC BLOOD PRESSURE: 132 MMHG | BODY MASS INDEX: 38.14 KG/M2 | OXYGEN SATURATION: 99 % | WEIGHT: 237.32 LBS | HEART RATE: 56 BPM | RESPIRATION RATE: 12 BRPM

## 2025-03-07 DIAGNOSIS — Z02.83 ENCOUNTER FOR DRUG SCREENING: ICD-10-CM

## 2025-03-07 DIAGNOSIS — S83.92XA SPRAIN OF LEFT KNEE, UNSPECIFIED LIGAMENT, INITIAL ENCOUNTER: ICD-10-CM

## 2025-03-07 DIAGNOSIS — S80.02XA CONTUSION OF LEFT KNEE, INITIAL ENCOUNTER: ICD-10-CM

## 2025-03-07 LAB
AMP AMPHETAMINE: NORMAL
BAR BARBITURATES: NORMAL
BREATH ALCOHOL COMMENT: NORMAL
BZO BENZODIAZEPINES: NORMAL
COC COCAINE: NORMAL
INT CON NEG: NORMAL
INT CON POS: NORMAL
MDMA ECSTASY: NORMAL
MET METHAMPHETAMINES: NORMAL
MTD METHADONE: NORMAL
OPI OPIATES: NORMAL
OXY OXYCODONE: NORMAL
PCP PHENCYCLIDINE: NORMAL
POC BREATHALIZER: 0 PERCENT (ref 0–0.01)
POC URINE DRUG SCREEN OCDRS: NORMAL
THC: NORMAL

## 2025-03-07 PROCEDURE — 80305 DRUG TEST PRSMV DIR OPT OBS: CPT

## 2025-03-07 PROCEDURE — 99213 OFFICE O/P EST LOW 20 MIN: CPT

## 2025-03-07 PROCEDURE — 3078F DIAST BP <80 MM HG: CPT

## 2025-03-07 PROCEDURE — 73564 X-RAY EXAM KNEE 4 OR MORE: CPT | Mod: LT

## 2025-03-07 PROCEDURE — 82075 ASSAY OF BREATH ETHANOL: CPT

## 2025-03-07 PROCEDURE — 3075F SYST BP GE 130 - 139MM HG: CPT

## 2025-03-07 RX ORDER — CEPHALEXIN 500 MG/1
500 CAPSULE ORAL 4 TIMES DAILY
Qty: 20 CAPSULE | Refills: 0 | Status: SHIPPED | OUTPATIENT
Start: 2025-03-07 | End: 2025-03-07

## 2025-03-07 NOTE — LETTER
PHYSICIAN’S AND CHIROPRACTIC PHYSICIAN'S   PROGRESS REPORT   CERTIFICATION OF DISABILITY Claim Number:     Social Security Number:    Patient’s Name: Balbina Randall Date of Injury: 3/6/2025   Employer: [employer] Name of [facility] (if applicable):      Patient’s Job Description/Occupation: RN       Previous Injuries/Diseases/Surgeries Contributing to the Condition:         Diagnosis: (S83.92XA) Sprain of left knee, unspecified ligament, initial encounter  (S80.02XA) Contusion of left knee, initial encounter      Related to the Industrial Injury? Yes     Explain: DOI: 3/6/25 Pt was walking to the lab and slipped on water.  She landed straight down on her left knee.  She is able to bear weight, she has a few small superficial abrasion but most concerned about a burning type pain on the anteriolateral aspect of the knee      Objective Medical Findings: Multiple small superficial abrasions  No instability  Tenderness on left lateral knee         None - Discharged                         Stable  No                 Ratable  No        Generally Improved                         Condition Worsened                  Condition Same  May Have Suffered a Permanent Disability No     Treatment Plan:    Rest, ice, motrin, ace wrap, wound care         No Change in Therapy                  PT/OT Prescribed                      Medication May be Used While Working        Case Management                          PT/OT Discontinued    Consultation    Further Diagnostic Studies:    Prescription(s)                 Released to FULL DUTY /No Restrictions on (Date):       Certified TOTALLY TEMPORARILY DISABLED (Indicate Dates) From:   To:    X  Released to RESTRICTED/Modified Duty on (Date): From: 3/7/2025 To: 3/10/2025  Restrictions Are:         No Sitting    No Standing    No Pulling Other: FU Monday after 4 days resting to determine work restrictions for her next day back on Tuesday.        No Bending at Waist     No Stooping     No  Lifting        No Carrying     No Walking Lifting Restricted to (lbs.):          No Pushing        No Climbing     No Reaching Above Shoulders       Date of Next Visit:  3/10/2025 Date of this Exam: 3/7/2025 Physician/Chiropractic Physician Name: CHELSEY Marquez Physician/Chiropractic Physician Signature:  Shiv Christensen DO MPH     [place]:  [address] [place] [address], [address] [place], [place] [address] - Telephone [phone] [place]:  [address] [place] [address], [address] [place], [place] [address] - Telephone [phone]    [email or web address]  D-39 (Rev. 10/24)

## 2025-03-07 NOTE — LETTER
"  EMPLOYEE’S CLAIM FOR COMPENSATION/ REPORT OF INITIAL TREATMENT  FORM C-4  PLEASE TYPE OR PRINT    EMPLOYEE’S CLAIM - PROVIDE ALL INFORMATION REQUESTED   First Name                    FABIANA Mortensen                  Last Name  Prakash Birthdate                    1975                Sex  [x]Female Claim Number (Insurer’s Use Only)     Mailing Address  [address] Age  50 y.o. Height  1.676 m (5' 6\") Weight  108 kg (237 lb 5.2 oz) Social Security Number     [facility] Zip  [address] Telephone  There are no phone numbers on file.   Email  [email or web address]    Primary Language Spoken  English    INSURER   THIRD-PARTY   Esis   Employee's Occupation (Job Title) When Injury or Occupational Disease Occurred  RN    Employer's Name/Company Name  [employer]  Telephone  [phone]    Office Mail Address (Number and Street)  [address] [place] [address]     Date of Injury (if applicable) 3/6/2025               Hours Injury (if applicable)  5:00 PM am    pm Date Employer Notified  3/6/2025 Last Day of Work after Injury or Occupational Disease  3/7/2025 Supervisor to Whom Injury Reported  Homa Lebron   Address or Location of Accident (if applicable)  Work [1]   What were you doing at the time of accident? (if applicable)  Walking a specimen to lab room    How did this injury or occupational disease occur? (Be specific and answer in detail. Use additional sheet if necessary)  Walking in [place] to drop off specimen in OR lab room.  I slipped and fell  onto L knee.  I was then able to see the water that was on the floor   If you believe that you have an occupational disease, when did you first have knowledge of the disability and its relationship to your employment?   Witnesses to the Accident (if applicable)  Ronn Huddleston      Nature of Injury or Occupational Disease  Workers' Compensation  Part(s) of " Body Injured or Affected  Knee (L) N/A N/A    I CERTIFY THAT THE ABOVE IS TRUE AND CORRECT TO T HE BEST OF MY KNOWLEDGE AND THAT I HAVE PROVIDED THIS INFORMATION IN ORDER TO OBTAIN THE BENEFITS OF [place]CC[place], OR CHAPTER 617 [place]).  I HEREBY AUTHORIZE ANY PHYSICIAN, CHIROPRACTOR, SURGEON, PRACTITIONER OR ANY OTHER PERSON, ANY HOSPITAL, INCLUDING [facility], ANY  MEDICAL SERVICE ORGANIZATION, ANY INSURANCE COMPANY, OR OTHER INSTITUTION OR ORGANIZATION TO RELEASE TO EACH OTHER, ANY MEDICAL OR OTHER INFORMATION, INCLUDING BENEFITS PAID OR PAYABLE, PERTINENT TO THIS INJURY OR DISEASE, EXCEPT INFORMATION RELATIVE TO DIAGNOSIS, TREATMENT AND/OR COUNSELING FOR AIDS, PSYCHOLOGICAL CONDITIONS, ALCOHOL OR CONTROLLED SUBSTANCES, FOR WHICH I MUST GIVE SPECIFIC AUTHORIZATION.  A PHOTOSTAT OF THIS AUTHORIZATION SHALL BE VALID AS THE ORIGINAL.     Date  03/07/25   Place  [facility]  Employee’s Original or  *Electronic Signature   THIS REPORT MUST BE COMPLETED AND MAILED WITHIN 3 WORKING DAYS OF TREATMENT   Place  [facility]    Name of Facility  [facility]   Date 3/7/2025 Diagnosis and Description of Injury or Occupational Disease  (S83.92XA) Sprain of left knee, unspecified ligament, initial encounter  (S80.02XA) Contusion of left knee, initial encounter  Diagnoses of Sprain of left knee, unspecified ligament, initial encounter and Contusion of left knee, initial encounter were pertinent to this visit. Is there evidence that the injured employee was under the influence of alcohol and/or another controlled substance at the time of accident?  []No  [] Yes (if yes, please explain)   Hour 6:49 PM  No   Treatment: Rest, ice, motrin, ace wrap, wound care    Have you advised the patient to remain off work five days or more?   No  [] Yes  If yes, indicate dates: From_ _                                                       To __ _  [] No   If no, is the injured employee capable of: [] full duty No   [] modified duty Yes    If modified duty, specify any limitations / restrictions:__________________  ___ ___________________________     X-Ray Findings: Negative    From information given by the employee, together with medical evidence, can you directly connect this injury or occupational disease as job incurred?  []Yes   [] No Yes    Is additional medical care by a physician indicated? []Yes [] No  Yes    Do you know of any previous injury or disease contributing to this condition or occupational disease? []Yes [] No (Explain if yes)                          No   Date  3/7/2025 Print Health Care Provider’s Name  CHELSEY Marquez I certify that the employer’s copy of  this form was delivered to the employer on:   Address  [address] INSURER'S USE ONLY                       [facility]  [address] Provider’s Tax ID Number  [id number]   Telephone  Dept: [phone]    Health Care Provider’s Original or Electronic Signature      e-EDY Bermudez    Degree (MD,DO, DC,PA-C,APRN)  APRN  Choose (if applicable)      ORIGINAL - TREATING HEALTHCARE PROVIDER PAGE 2 - INSURER/TPA PAGE 3 - EMPLOYER PAGE 4 - EMPLOYEE             Form C-4 (rev.02/25)

## 2025-03-08 NOTE — PROGRESS NOTES
"Balbina Randall is a 50 y.o. female who presents for Other (WC DOI [id number], L knee, slipped of water and slammed knee on ground, 2 abrasion / swlling instant, tender to touch w/ burning sensation. )    DOI: 3/6/25 Pt was walking to the lab and slipped on water.  She landed straight down on her left knee.  She is able to bear weight, she has a few small superficial abrasion but most concerned about a burning type pain on the anteriolateral aspect of the knee       PMH:   No pertinent past medical history to this problem  MEDS:  Medications were reviewed in EMR  ALLERGIES:  Allergies were reviewed in EMR  FH:   No pertinent family history to this problem       Objective:     /70 (BP Location: Right arm, Patient Position: Sitting, BP Cuff Size: Adult long)   Pulse (!) 56   Temp 36.3 °C (97.3 °F) (Temporal)   Resp 12   Ht 1.676 m (5' 6\")   Wt 108 kg (237 lb 5.2 oz)   SpO2 99%   BMI 38.31 kg/m²     Physical Exam  Musculoskeletal:        Legs:       Comments: Tenderness at lateral knee  Small abrasions         Multiple small superficial abrasions  No instability  Tenderness on left lateral knee      Assessment/Plan:     IMPRESSION:  Pt has stable vital signs and no red flag symptoms or exam findings identified.    1. Sprain of left knee, unspecified ligament, initial encounter  - DX-KNEE COMPLETE 4+ LEFT; Future    2. Contusion of left knee, initial encounter    She will FU on Monday before her work week starts on Tuesday.  She will take the next 3 days to rest her knee, keep covered.  Ace wrap applied by pt    Differential diagnosis, natural history, supportive care, and indications for immediate follow-up discussed.  "

## 2025-03-10 ENCOUNTER — OCCUPATIONAL MEDICINE (OUTPATIENT)
Dept: URGENT CARE | Facility: PHYSICIAN GROUP | Age: 50
End: 2025-03-10
Payer: COMMERCIAL

## 2025-03-10 VITALS
HEART RATE: 60 BPM | RESPIRATION RATE: 12 BRPM | DIASTOLIC BLOOD PRESSURE: 66 MMHG | TEMPERATURE: 97.6 F | BODY MASS INDEX: 38.09 KG/M2 | OXYGEN SATURATION: 98 % | HEIGHT: 66 IN | SYSTOLIC BLOOD PRESSURE: 118 MMHG | WEIGHT: 237 LBS

## 2025-03-10 DIAGNOSIS — S89.92XD LEFT KNEE INJURY, SUBSEQUENT ENCOUNTER: ICD-10-CM

## 2025-03-10 PROCEDURE — 3074F SYST BP LT 130 MM HG: CPT

## 2025-03-10 PROCEDURE — 3078F DIAST BP <80 MM HG: CPT

## 2025-03-10 PROCEDURE — 99213 OFFICE O/P EST LOW 20 MIN: CPT

## 2025-03-10 ASSESSMENT — ENCOUNTER SYMPTOMS
SHORTNESS OF BREATH: 0
CHILLS: 0
FEVER: 0

## 2025-03-10 NOTE — LETTER
PHYSICIAN’S AND CHIROPRACTIC PHYSICIAN'S   PROGRESS REPORT   CERTIFICATION OF DISABILITY Claim Number:     Social Security Number:    Patient’s Name: Balbina Randall Date of Injury: 3/6/2025   Employer: [employer] Name of [facility] [place] (if applicable):      Patient’s Job Description/Occupation: RN       Previous Injuries/Diseases/Surgeries Contributing to the Condition:  No previous injuries, diseases or surgeries that contribute to current condition.       Diagnosis: (S89.92XD) Left knee injury, subsequent encounter      Related to the Industrial Injury? Yes     Explain: See notes from initial incident       Objective Medical Findings: Continues to have burning sensation laterally across knee with touch or lateral lying position. Pain with straightening movements and burning sensation illicited with flexion of the knee.         None - Discharged                         Stable  No                 Ratable  No        Generally Improved                         Condition Worsened               X   Condition Same  May Have Suffered a Permanent Disability No     Treatment Plan:    -Continue ibuprofen and acetaminophen  -Rest, ice, compression, and elevation   -Continue with work restrictions           No Change in Therapy                  PT/OT Prescribed                      Medication May be Used While Working        Case Management                          PT/OT Discontinued    Consultation    Further Diagnostic Studies:    Prescription(s)                 Released to FULL DUTY /No Restrictions on (Date):       Certified TOTALLY TEMPORARILY DISABLED (Indicate Dates) From:   To:    X  Released to RESTRICTED/Modified Duty on (Date): From: 3/10/2025 To: 3/17/2025  Restrictions Are:  Temporary      No Sitting    No Standing    No Pulling Other: -No climbing stairs repeatedly  -Limited pushing and pulling  -Limited crouching          No Bending at Waist     No Stooping     No Lifting        No Carrying     No Walking Lifting  Restricted to (lbs.):          No Pushing        No Climbing     No Reaching Above Shoulders       Date of Next Visit:  3/17/2025 Date of this Exam: 3/10/2025 Physician/Chiropractic Physician Name: CHELSEY Maki Physician/Chiropractic Physician Signature:  Shiv Christensen DO MPH     [place]:  [address] [place] [address], [address] [place], [place] [address] - Telephone [phone] [place]:  [address] [place] [address], [address] [place], [place] [address] - Telephone [phone]    [email or web address]  D-39 (Rev. 10/24)

## 2025-03-10 NOTE — PROGRESS NOTES
"Subjective     Balbina Randall is a 50 y.o. female who presents with Follow-Up (Workers comp follow up )            Patient is following-up from her initial work injury from 3/6/2025. She reports that she slipped on water while at work leading to her falling and subsequently injuring her left knee.         Review of Systems   Constitutional:  Negative for chills and fever.   Respiratory:  Negative for shortness of breath.    Cardiovascular:  Negative for chest pain.   Musculoskeletal:  Positive for joint pain.              Objective     /66 (BP Location: Left arm, Patient Position: Sitting)   Pulse 60   Temp 36.4 °C (97.6 °F) (Temporal)   Resp 12   Ht 1.676 m (5' 6\")   Wt 108 kg (237 lb)   SpO2 98%   BMI 38.25 kg/m²      Physical Exam  Constitutional:       Appearance: Normal appearance.   HENT:      Head: Normocephalic and atraumatic.      Mouth/Throat:      Mouth: Mucous membranes are moist.   Musculoskeletal:      Left knee: Swelling and laceration present. No deformity or ecchymosis. Tenderness present over the patellar tendon.        Legs:       Comments: 2 healing abrasions noted along the knee measuring approximately 2x2 cm each.No drainage noted    Neurological:      Mental Status: She is alert and oriented to person, place, and time.           Assessment & Plan  Left knee injury, subsequent encounter    -Continue treating left knee pain with ibuprofen and acetaminophen   -Continue RICE  -Continue work restrictions see D39               "

## 2025-03-17 ENCOUNTER — OCCUPATIONAL MEDICINE (OUTPATIENT)
Dept: URGENT CARE | Facility: PHYSICIAN GROUP | Age: 50
End: 2025-03-17
Payer: COMMERCIAL

## 2025-03-17 VITALS
RESPIRATION RATE: 14 BRPM | HEART RATE: 50 BPM | OXYGEN SATURATION: 97 % | WEIGHT: 237 LBS | SYSTOLIC BLOOD PRESSURE: 116 MMHG | TEMPERATURE: 97.2 F | DIASTOLIC BLOOD PRESSURE: 72 MMHG | BODY MASS INDEX: 38.09 KG/M2 | HEIGHT: 66 IN

## 2025-03-17 DIAGNOSIS — S80.02XD CONTUSION OF LEFT KNEE, SUBSEQUENT ENCOUNTER: ICD-10-CM

## 2025-03-17 DIAGNOSIS — S89.92XD LEFT KNEE INJURY, SUBSEQUENT ENCOUNTER: ICD-10-CM

## 2025-03-17 PROCEDURE — 3078F DIAST BP <80 MM HG: CPT | Performed by: PHYSICIAN ASSISTANT

## 2025-03-17 PROCEDURE — 3074F SYST BP LT 130 MM HG: CPT | Performed by: PHYSICIAN ASSISTANT

## 2025-03-17 PROCEDURE — 99213 OFFICE O/P EST LOW 20 MIN: CPT | Performed by: PHYSICIAN ASSISTANT

## 2025-03-17 NOTE — PROGRESS NOTES
"Subjective     Balbina Randall is a 50 y.o. female who presents with Knee Pain (Worker's comp fv. PT notes the pain in her knee has gotten better, but has ongoing pressure and feels a burning sensation when putting pressure on her leg in the lateral side of her knee. Has been alternating between motrin and tylenol. )            HPI    The patient presents to clinic for Workmen's Comp. follow-up.    DOI: 3/6/2025 -copied from original visit- \"Pt was walking to the lab and slipped on water. She landed straight down on her left knee. She is able to bear weight, she has a few small superficial abrasion but most concerned about a burning type pain on the anteriolateral aspect of the knee.\"    Visit #3:   Today, the patient states her overall injury to her left knee has improved.  The patient reports continued burning pain and pressure to her kneecap which radiates to the anterior lateral portion of the left knee.  The patient states the swelling has improved.  She reports no associated bruising or redness.  The patient states the 2 areas of scabbing are also improved.  The patient states she is not able to apply pressure to the anterior aspect of her left knee.  The patient is able to walk without difficulty.  She reports no decreased range of motion.  The patient is taken OTC Tylenol and Motrin for her current symptoms.  The patient states her employer is currently not providing her light duty, stating that they do not accommodate work restrictions.    PMH:  has a past medical history of Airway problem (1/19/15), Heart burn, Indigestion, Other specified symptom associated with female genital organs, Harleen-Parkinson-White syndrome, and WPW (Harleen-Parkinson-White syndrome) (since birth).  MEDS:   Current Outpatient Medications:     pantoprazole (PROTONIX) 40 MG Tablet Delayed Response, Take 1 Tablet by mouth every day., Disp: 90 Tablet, Rfl: 3    hyoscyamine (LEVSIN) 0.125 MG tablet, Take 1 Tablet by mouth every " four hours as needed for Cramping for up to 90 days., Disp: 30 Tablet, Rfl: 0    gabapentin (NEURONTIN) 300 MG Cap, Take 1-2 Capsules by mouth 3 times a day as needed (pain)., Disp: 180 Capsule, Rfl: 5    tizanidine (ZANAFLEX) 4 MG Tab, Take 1 Tablet by mouth at bedtime as needed (pain)., Disp: 30 Tablet, Rfl: 5    estradiol (VAGIFEM) 10 MCG Tab, insert one tablet vaginally two times a week, Disp: , Rfl:     acetaminophen (TYLENOL) 500 MG Tab, Take 2 Tablets by mouth 3 times a day as needed (pain.  Do not exceed 3000 mg of total acetaminophen per day)., Disp: 180 Tablet, Rfl: 6    FLOWFLEX COVID-19 AG HOME TEST Kit, ADMINISTER AS PER PROTOCOL (Patient not taking: Reported on 3/17/2025), Disp: , Rfl:   ALLERGIES:   Allergies   Allergen Reactions    Other Misc      Additive In Estrogen Im    Other reaction(s): Rash    Sulfa Drugs Rash and Itching     JOX=6127  Other reaction(s): Rash, itching    Latex Rash     LDA=8113    Other Drug Rash     Allergic to Estrogen IM - preservative within medication  BTC=5217    Wound Dressing Adhesive Hives     Band-aids cause rash and itching     SURGHX:   Past Surgical History:   Procedure Laterality Date    DESTRUCTION, NERVE, USING NEUROLYTIC AGENT Right 10/12/2022    Procedure: RIGHT radiofrequency neurotomies medial branch targeting rhe L3-4 and L4-5 facet joints with fluoroscopic guidance and sedation;  Surgeon: Davide Bartholomew M.D.;  [facility];  Service: Pain Management    RADIO FREQUENCY ABLATION ADDITIONAL LEVEL Left 9/28/2022    Procedure: LEFT radiofrequency neurotomies medial branch targeting the L3-4 and L4-5 facet joints with fluorsoscopic guidance and sedation;  Surgeon: Davide Bartholomew M.D.;  [facility];  Service: Pain Management    LUMBAR MEDIAL BRANCH BLOCKS Bilateral 9/7/2022    Procedure: Diagnostic medial branch blocks targeting the BILATERAL L3-4 and L4-5 facet joints with fluoroscopic guidance #2;   Surgeon: Davide Bartholomew M.D.;  [facility];  Service: Pain Management    LUMBAR MEDIAL BRANCH BLOCKS Bilateral 8/31/2022    Procedure: Diagnostic media branch blocks targeting the BILATERAL L3-4 and L4-5 facet joints with fluoroscopic guidance;  Surgeon: Davide Bartholomew M.D.;  [facility];  Service: Pain Management    LUMBAR TRANSFORAMINAL EPIDURAL STEROID INJECTION Left 4/19/2022    Procedure: LEFT L3-4 and L4-5 transforaminal epidural steroid injection with fluoroscopic guidance;  Surgeon: Davide Bartholomew M.D.;  [facility];  Service: Pain Management    BLOCK EPIDURAL STEROID INJECTION Left 4/19/2022    Procedure: .;  Surgeon: Davide Bartholomew M.D.;  [facility];  Service: Pain Management    WI SHLDR ARTHROSCOP,PART ACROMIOPLAS Right 1/29/2020    Procedure: DECOMPRESSION, SHOULDER, ARTHROSCOPIC - SUBACROMIAL, EXTENSIVE DEBRIDEMENT;  Surgeon: Robert Park M.D.;  Location: [facility];  Service: Orthopedics    CLAVICLE DISTAL EXCISION Right 1/29/2020    Procedure: EXCISION, CLAVICLE, DISTAL;  Surgeon: Robert Park M.D.;  Location: [facility];  Service: Orthopedics    ROTATOR CUFF REPAIR Right 1/29/2020    Procedure: REPAIR, ROTATOR CUFF;  Surgeon: Robert Park M.D.;  Location: [facility];  Service: Orthopedics    HYSTEROSCOPY NOVASURE-2  4/27/2017    Procedure: HYSTEROSCOPY NOVASURE;  Surgeon: Lindsay Webb M.D.;  Location: [facility];  Service:     WRIST ARTHROSCOPY Right 1/13/2016    Procedure: WRIST ARTHROSCOPY with debridment  ;  Surgeon: Jose Daniel Redmond M.D.;  [facility]: [facility];  Service:     LIGAMENT REPAIR  1/13/2016    Procedure: LIGAMENT REPAIR;  Surgeon: Jose Daniel Redmond M.D.;  Location: [facility];  Service:     COLONOSCOPY WITH BIOPSY  1/26/2015    Performed by Mauricio Serrato  "M.D. at SURGERY [facility]    GASTROSCOPY WITH BIOPSY  1/26/2015    Performed by Mauricio Serrato M.D. at SURGERY [facility]    HYSTEROSCOPY WITH VIDEO DIAGNOSTIC  5/24/2013    Performed by Robert Rosario M.D. at SURGERY [facility]    KIRT BY LAPAROSCOPY  9/2/2010    Performed by KIM ZHAO at SURGERY [facility]    PRIMARY C SECTION  4/30/2010    Performed by DAXA MILLAN at LABOR AND DELIVERY    BOWEL RESECTION  1992    \"adhesions\"    APPENDECTOMY  1992    LAPAROSCOPY  1992, 2002    OTHER      kenalog inj left foot     SOCHX:  reports that she has never smoked. She has never used smokeless tobacco. She reports current alcohol use. She reports that she does not use drugs.  FH: Family history was reviewed, no pertinent findings to report      ROS           Objective     /72 (BP Location: Right arm, Patient Position: Sitting, BP Cuff Size: Adult long)   Pulse (!) 50   Temp 36.2 °C (97.2 °F) (Temporal)   Resp 14   Ht 1.676 m (5' 6\") Comment: Pt reported  Wt 108 kg (237 lb)   SpO2 97%   BMI 38.25 kg/m²      Physical Exam  Constitutional:       General: She is not in acute distress.     Appearance: Normal appearance. She is well-developed. She is not ill-appearing.   HENT:      Head: Normocephalic and atraumatic.      Right Ear: External ear normal.      Left Ear: External ear normal.      Nose: Nose normal.   Eyes:      Extraocular Movements: Extraocular movements intact.      Conjunctiva/sclera: Conjunctivae normal.   Cardiovascular:      Rate and Rhythm: Normal rate.   Pulmonary:      Effort: Pulmonary effort is normal.   Musculoskeletal:      Cervical back: Normal range of motion and neck supple.      Comments:   Left Knee:  Tenderness to the anterior aspect of the right knee overlying the patella with tenderness extending to the anterior lateral aspect of the left knee.  No edema.  No ecchymosis.  No overlying erythema.  No increased warmth.  2 healing abrasions are " present to the anterior aspect of the left knee.  No secondary signs of infection.  ROM intact -patient demonstrates full active range of motion of the left knee  Neurovascular intact distally  Strength 5/5  Normal gait   Skin:     General: Skin is warm and dry.   Neurological:      Mental Status: She is alert and oriented to person, place, and time.                                  Assessment & Plan  Left knee injury, subsequent encounter    Orders:    Referral to Occupational Medicine    Contusion of left knee, subsequent encounter    Orders:    Referral to Occupational Medicine           Plan:  Light Duty Work Restrictions - D39 provided   OTC NSAIDs for pain/discomfort   RICE  Weight-bearing as tolerated  Referral to Occupational Medicine   Follow-up with Occupational Medicine in 7-10 days  Return to clinic or go tot he ED if symptoms worsen or fail to improve, or if the patient should develop worsening/increasing pain/tenderness, swelling, bruising, redness or warmth to the affected area, decreased ROM, numbness, tingling or weakness, difficulty walking, fever/chills, and/or any concerning symptoms.       Discussed plan with the patient, and she agrees to the above.     I personally reviewed prior external notes and test results pertinent to today's visit.  I have independently reviewed and interpreted all diagnostics ordered during this urgent care visit.     Please note that this dictation was created using voice recognition software. I have made every reasonable attempt to correct obvious errors, but I expect that there may be errors of grammar and possibly content that I did not discover before finalizing the note.     This note was electronically signed by Marilin Estes PA-C

## 2025-03-17 NOTE — LETTER
"PHYSICIAN’S AND CHIROPRACTIC PHYSICIAN'S   PROGRESS REPORT   CERTIFICATION OF DISABILITY Claim Number:     Social Security Number:    Patient’s Name: Balbina Randall Date of Injury: 3/6/2025   Employer: [employer] [place] of [employer] [place]      Patient’s Job Description/Occupation: RN       Previous Injuries/Diseases/Surgeries Contributing to the Condition:         Diagnosis: (S89.92XD) Left knee injury, subsequent encounter  (S80.02XD) Contusion of left knee, subsequent encounter      Related to the Industrial Injury? Yes     Explain: The patient presents to clinic for Workmen's Comp. follow-up.    DOI: 3/6/2025 -copied from original visit- \"Pt was walking to the lab and slipped on water. She landed straight down on her left knee. She is able to bear weight, she has a few small superficial abrasion but most concerned about a burning type pain on the anteriolateral aspect of the knee.\"    Visit #3:   Today, the patient states her overall injury to her left knee has improved.  The patient reports continued burning pain and pressure to her kneecap which radiates to the anterior lateral portion of the left knee.  The patient states the swelling has improved.  She reports no associated bruising or redness.  The patient states the 2 areas of scabbing are also improved.  The patient states she is not able to apply pressure to the anterior aspect of her left knee.  The patient is able to walk without difficulty.  She reports no decreased range of motion.  The patient is taken OTC Tylenol and Motrin for her current symptoms.  The patient states her employer is currently not providing her light duty, stating that they do not accommodate work restrictions.      Objective Medical Findings: Left Knee:  Tenderness to the anterior aspect of the right knee overlying the patella with tenderness extending to the anterior lateral aspect of the left knee.  No edema.  No ecchymosis.  No overlying erythema.  No increased " warmth.  2 healing abrasions are present to the anterior aspect of the left knee.  No secondary signs of infection.  ROM intact -patient demonstrates full active range of motion of the left knee  Neurovascular intact distally  Strength 5/5  Normal gait            None - Discharged                         Stable  No                 Ratable  No     X   Generally Improved                         Condition Worsened                  Condition Same  May Have Suffered a Permanent Disability No     Treatment Plan:    Plan:  Light Duty Work Restrictions - D39 provided   OTC NSAIDs for pain/discomfort   RICE  Weight-bearing as tolerated  Referral to Occupational Medicine   Follow-up with Occupational Medicine in 7-10 days  Return to clinic or go tot he ED if symptoms worsen or fail to improve, or if the patient should develop worsening/increasing pain/tenderness, swelling, bruising, redness or warmth to the affected area, decreased ROM, numbness, tingling or weakness, difficulty walking, fever/chills, and/or any concerning symptoms.            No Change in Therapy                  PT/OT Prescribed                      Medication May be Used While Working        Case Management                          PT/OT Discontinued  X  Consultation    Further Diagnostic Studies:    Prescription(s) Referral to Occupational Medicine                Released to FULL DUTY /No Restrictions on (Date):       Certified TOTALLY TEMPORARILY DISABLED (Indicate Dates) From:   To:    X  Released to RESTRICTED/Modified Duty on (Date): From: 3/17/2025 To: 3/27/2025  Restrictions Are:  Temporary      No Sitting    No Standing    No Pulling Other: -No climbing stairs repeatedly  -Limited pushing and pulling  -Limited crouching          No Bending at Waist     No Stooping     No Lifting        No Carrying     No Walking Lifting Restricted to (lbs.):          No Pushing        No Climbing     No Reaching Above Shoulders       Date of Next Visit:  3/27/2025  with Occupational Medicine  Date of this Exam: 3/17/2025 Physician/Chiropractic Physician Name: Marilin Estes P.A.-C. Physician/Chiropractic Physician Signature:  Shiv Christensen DO MPH     [place]:  [facility]  [facility], [address] [place], [place] [address] - Telephone [phone] [place]:  [address] [place] [address], [address] [place], [place] [address] - Telephone [phone]    [email or web address]  D-39 (Rev. 10/24)

## 2025-03-27 ENCOUNTER — OCCUPATIONAL MEDICINE (OUTPATIENT)
Dept: OCCUPATIONAL MEDICINE | Facility: CLINIC | Age: 50
End: 2025-03-27
Payer: COMMERCIAL

## 2025-03-27 VITALS
DIASTOLIC BLOOD PRESSURE: 72 MMHG | HEIGHT: 66 IN | RESPIRATION RATE: 16 BRPM | WEIGHT: 232 LBS | OXYGEN SATURATION: 96 % | HEART RATE: 90 BPM | BODY MASS INDEX: 37.28 KG/M2 | TEMPERATURE: 97.5 F | SYSTOLIC BLOOD PRESSURE: 132 MMHG

## 2025-03-27 DIAGNOSIS — S80.02XD CONTUSION OF LEFT KNEE, SUBSEQUENT ENCOUNTER: ICD-10-CM

## 2025-03-27 PROCEDURE — 99214 OFFICE O/P EST MOD 30 MIN: CPT | Performed by: PREVENTIVE MEDICINE

## 2025-03-27 ASSESSMENT — PAIN SCALES - GENERAL: PAINLEVEL_OUTOF10: 2=MINIMAL-SLIGHT

## 2025-03-27 NOTE — LETTER
PHYSICIAN’S AND CHIROPRACTIC PHYSICIAN'S   PROGRESS REPORT   CERTIFICATION OF DISABILITY Claim Number:     Social Security Number:    Patient’s Name: Balbina Randall Date of Injury: 3/6/2025   Employer: [employer] Name of [facility] [facility] [place] (if applicable):      Patient’s Job Description/Occupation: RN       Previous Injuries/Diseases/Surgeries Contributing to the Condition:         Diagnosis: (S80.02XD) Contusion of left knee, subsequent encounter      Related to the Industrial Injury? Yes     Explain:        Objective Medical Findings: Left knee: Well-healed abrasions over the anterior knee.  Full range of motion.  Tenderness over the lateral joint line and patella.  Full range of motion.  Normal gait.  Anterior/posterior drawer test negative.  No laxity varus or valgus stress.  Saran's negative.  Able to squat with minimal difficulty.         None - Discharged                         Stable  No                 Ratable  No     X   Generally Improved                         Condition Worsened                  Condition Same  May Have Suffered a Permanent Disability No     Treatment Plan:    Will continue conservative management at this time  Prescribed diclofenac 1% gel to use 3-4 times daily as needed  Increase activity as tolerated  If symptoms continue to improve will consider released to full duty at next visit         No Change in Therapy                  PT/OT Prescribed                      Medication May be Used While Working        Case Management                          PT/OT Discontinued    Consultation    Further Diagnostic Studies:    Prescription(s)                 Released to FULL DUTY /No Restrictions on (Date):       Certified TOTALLY TEMPORARILY DISABLED (Indicate Dates) From:   To:    X  Released to RESTRICTED/Modified Duty on (Date): From: 3/27/2025 To: 4/10/2025  Restrictions Are:  Temporary      No Sitting    No Standing    No Pulling Other: No climbing stairs repeatedly  -Limited pushing and  pulling  -Limited crouching          No Bending at Waist     No Stooping     No Lifting        No Carrying     No Walking Lifting Restricted to (lbs.):          No Pushing        No Climbing     No Reaching Above Shoulders       Date of Next Visit:  4/10/2025  @8:30 am  Date of this Exam: 3/27/2025 Physician/Chiropractic Physician Name: Shiv Christensen D.O. Physician/Chiropractic Physician Signature:  Shiv Christensen DO MPH     [place]:  [address] [place] [address], [address] [place], [place] [address] - Telephone [phone] [place]:  [address] [place] [address], [address] [place], [place] [address] - Telephone [phone]    [email or web address]  D-39 (Rev. 10/24)

## 2025-03-27 NOTE — PROGRESS NOTES
"Subjective:     Balbina Randall is a 50 y.o. 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  Landed directly on left knee anteriorly.  Noted immediate pain.    3/27/2025: She was seen in urgent care x 3.  X-rays were negative.  She was advised NSAIDs and work restrictions.  She states initially her workplace was not really following restrictions and she was getting increased pain.  She states the last week and a half or so they have been following her restrictions which has helped.  She states that the knee pain is mostly anterior and lateral.  Pain is more of a burning sensation.  She is sensitive to touch in the area but does not feel a lot of pain with walking, squatting or standing unless it is done for prolonged period of time.  Denies significant prior left knee injuries.    Reviewed urgent care notes from 3/7/2025, 3/10/2025 and 3/17/2025.  Reviewed x-ray from 3/7/2025    ROS: All systems were reviewed on intake form, form was reviewed and signed. See scanned documents in media. Pertinent positives and negatives included in HPI.    PMH: No pertinent past medical history to this problem  MEDS: Medications were reviewed in Epic  ALLERGIES:   Allergies   Allergen Reactions    Other Misc      Additive In Estrogen Im    Other reaction(s): Rash    Sulfa Drugs Rash and Itching     EES=5555  Other reaction(s): Rash, itching    Latex Rash     SWB=8749    Other Drug Rash     Allergic to Estrogen IM - preservative within medication  STU=3508    Wound Dressing Adhesive Hives     Band-aids cause rash and itching     SOCHX: Works as a surgery RN at [employer]  FH: No pertinent family history to this problem       Objective:     /72 (BP Location: Right arm, Patient Position: Sitting, BP Cuff Size: Adult)   Pulse 90   Temp 36.4 °C (97.5 °F) (Temporal)   Resp 16   Ht 1.676 m (5' 6\")   Wt 105 kg (232 lb)   SpO2 96%   BMI 37.45 kg/m² "     Constitutional: Patient is in no acute distress. Appears well-developed and well-nourished.   Cardiovascular: Normal rate.    Pulmonary/Chest: Effort normal. No respiratory distress.   Neurological: Patient is alert and oriented to person, place, and time.   Skin: Skin is warm and dry.   Psychiatric: Normal mood and affect. Behavior is normal.     Left knee: Well-healed abrasions over the anterior knee.  Full range of motion.  Tenderness over the lateral joint line and patella.  Full range of motion.  Normal gait.  Anterior/posterior drawer test negative.  No laxity varus or valgus stress.  Saran's negative.  Able to squat with minimal difficulty.    Assessment/Plan:     1. Contusion of left knee, subsequent encounter  - diclofenac sodium (VOLTAREN) 1 % Gel; Apply 4 g topically 3 times a day as needed (pain).  Dispense: 100 g; Refill: 0      Will continue conservative management at this time  Prescribed diclofenac 1% gel to use 3-4 times daily as needed  Increase activity as tolerated  If symptoms continue to improve will consider released to full duty at next visit    Work Status: Restricted Duty - see D-39 or other state/federal worker's compensation forms for specific restrictions if applicable  Follow up 2 weeks    Differential diagnosis, natural history, supportive care, and indications for immediate follow-up discussed.

## 2025-04-10 ENCOUNTER — OCCUPATIONAL MEDICINE (OUTPATIENT)
Dept: OCCUPATIONAL MEDICINE | Facility: CLINIC | Age: 50
End: 2025-04-10
Payer: COMMERCIAL

## 2025-04-10 VITALS
DIASTOLIC BLOOD PRESSURE: 80 MMHG | HEART RATE: 57 BPM | WEIGHT: 229 LBS | BODY MASS INDEX: 36.8 KG/M2 | HEIGHT: 66 IN | OXYGEN SATURATION: 98 % | TEMPERATURE: 98.4 F | SYSTOLIC BLOOD PRESSURE: 128 MMHG | RESPIRATION RATE: 16 BRPM

## 2025-04-10 DIAGNOSIS — S80.02XD CONTUSION OF LEFT KNEE, SUBSEQUENT ENCOUNTER: ICD-10-CM

## 2025-04-10 PROCEDURE — 3079F DIAST BP 80-89 MM HG: CPT | Performed by: PREVENTIVE MEDICINE

## 2025-04-10 PROCEDURE — 3074F SYST BP LT 130 MM HG: CPT | Performed by: PREVENTIVE MEDICINE

## 2025-04-10 PROCEDURE — 1125F AMNT PAIN NOTED PAIN PRSNT: CPT | Performed by: PREVENTIVE MEDICINE

## 2025-04-10 PROCEDURE — 99213 OFFICE O/P EST LOW 20 MIN: CPT | Performed by: PREVENTIVE MEDICINE

## 2025-04-10 ASSESSMENT — PAIN SCALES - GENERAL: PAINLEVEL_OUTOF10: 1=MINIMAL PAIN

## 2025-04-10 NOTE — LETTER
PHYSICIAN’S AND CHIROPRACTIC PHYSICIAN'S   PROGRESS REPORT   CERTIFICATION OF DISABILITY Claim Number:     Social Security Number:    Patient’s Name: Balbina Randall Date of Injury: 3/6/2025   Employer: [employer] Name of [facility] (if applicable):      Patient’s Job Description/Occupation: RN       Previous Injuries/Diseases/Surgeries Contributing to the Condition:         Diagnosis: (S80.02XD) Contusion of left knee, subsequent encounter      Related to the Industrial Injury? Yes     Explain:        Objective Medical Findings: Left knee: Tenderness over the lateral joint line and  distal and lateral border of the patella.  Full range of motion.  Normal gait.           None - Discharged                         Stable  No                 Ratable  No     X   Generally Improved                         Condition Worsened                  Condition Same  May Have Suffered a Permanent Disability No     Treatment Plan:    Will continue conservative management at this time.  Has had some improvement  Will release to full duty  Diclofenac gel as needed  Heat/ice as needed  If symptoms do not continue to improve will consider further diagnostics with MRI         No Change in Therapy                  PT/OT Prescribed                      Medication May be Used While Working        Case Management                          PT/OT Discontinued    Consultation    Further Diagnostic Studies:    Prescription(s)               X  Released to FULL DUTY /No Restrictions on (Date):  4/10/2025    Certified TOTALLY TEMPORARILY DISABLED (Indicate Dates) From:   To:      Released to RESTRICTED/Modified Duty on (Date): From:   To:    Restrictions Are:         No Sitting    No Standing    No Pulling Other:         No Bending at Waist     No Stooping     No Lifting        No Carrying     No Walking Lifting Restricted to (lbs.):          No Pushing        No Climbing     No Reaching Above Shoulders       Date of Next Visit:  5/8/2025 AT 8:00  AM Date of this Exam: 4/10/2025 Physician/Chiropractic Physician Name: Shiv Christensen D.O. Physician/Chiropractic Physician Signature:  Shiv Christensen DO MPH     [place]:  [facility]  [facility] [place] [facility], [address] [place], [place] [address] - Telephone [phone] [place]:  [address] [place] [address], [address] [place], [place] [address] - Telephone [phone]    [email or web address]  D-39 (Rev. 10/24)

## 2025-04-10 NOTE — PROGRESS NOTES
"Subjective:     Balbina Randall is a 50 y.o. 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  Landed directly on left knee anteriorly.  Noted immediate pain.     3/27/2025: She was seen in urgent care x 3.  X-rays were negative.  She was advised NSAIDs and work restrictions.  She states initially her workplace was not really following restrictions and she was getting increased pain.  She states the last week and a half or so they have been following her restrictions which has helped.  She states that the knee pain is mostly anterior and lateral.  Pain is more of a burning sensation.  She is sensitive to touch in the area but does not feel a lot of pain with walking, squatting or standing unless it is done for prolonged period of time.  Denies significant prior left knee injuries    4/10/2025: Patient states that overall symptoms are overall improved.  She states that it will walk better.  However she still has sensitivity to touch over the lateral side of the patella.  She states the area is very tender to touch at times.  She sometimes has a hard time applying even lotion because the sensitivity.  She states this area gets occasionally sore if she is standing or walking a lot but for the most part does okay walking.  She feels like she would be able to do her normal job.  Denies any clicking, popping or instability.    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       Objective:     /80   Pulse (!) 57   Temp 36.9 °C (98.4 °F) (Temporal)   Resp 16   Ht 1.676 m (5' 6\")   Wt 104 kg (229 lb)   SpO2 98%   BMI 36.96 kg/m²     Constitutional: Patient is in no acute distress. Appears well-developed and well-nourished.   Cardiovascular: Normal rate.    Pulmonary/Chest: Effort normal. No respiratory distress.   Neurological: Patient is alert and oriented to person, place, and " time.   Skin: Skin is warm and dry.   Psychiatric: Normal mood and affect. Behavior is normal.     Left knee: Tenderness over the lateral joint line and  distal and lateral border of the patella.  Full range of motion.  Normal gait.      Assessment/Plan:     1. Contusion of left knee, subsequent encounter      Will continue conservative management at this time.  Has had some improvement  Will release to full duty  Diclofenac gel as needed  Heat/ice as needed  If symptoms do not continue to improve will consider further diagnostics with MRI    Work Status: Full Duty - see D-39 or other state/federal worker's compensation forms for specific restrictions if applicable  Follow up 4 weeks    Differential diagnosis, natural history, supportive care, and indications for immediate follow-up discussed.

## 2025-05-08 ENCOUNTER — OCCUPATIONAL MEDICINE (OUTPATIENT)
Dept: OCCUPATIONAL MEDICINE | Facility: CLINIC | Age: 50
End: 2025-05-08
Payer: COMMERCIAL

## 2025-05-08 DIAGNOSIS — S80.02XD CONTUSION OF LEFT KNEE, SUBSEQUENT ENCOUNTER: ICD-10-CM

## 2025-05-08 PROCEDURE — 99213 OFFICE O/P EST LOW 20 MIN: CPT | Performed by: PREVENTIVE MEDICINE

## 2025-05-08 NOTE — PROGRESS NOTES
Subjective:     Balbina Randall is a 50 y.o. female who presents for No chief complaint on file.    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  Landed directly on left knee anteriorly.  Noted immediate pain.     3/27/2025: She was seen in urgent care x 3.  X-rays were negative.  She was advised NSAIDs and work restrictions.  She states initially her workplace was not really following restrictions and she was getting increased pain.  She states the last week and a half or so they have been following her restrictions which has helped.  She states that the knee pain is mostly anterior and lateral.  Pain is more of a burning sensation.  She is sensitive to touch in the area but does not feel a lot of pain with walking, squatting or standing unless it is done for prolonged period of time.  Denies significant prior left knee injuries     4/10/2025: Patient states that overall symptoms are overall improved.  She states that it will walk better.  However she still has sensitivity to touch over the lateral side of the patella.  She states the area is very tender to touch at times.  She sometimes has a hard time applying even lotion because the sensitivity.  She states this area gets occasionally sore if she is standing or walking a lot but for the most part does okay walking.  She feels like she would be able to do her normal job.  Denies any clicking, popping or instability.    5/8/2025: Patient continues to note anterior left knee pain.  Knee pain extends to the lateral side 2.  She states in regards to walking and squatting pain is minimal.  She states is still very tender to touch.  Patient states that she is able to work full duty with only mild discomfort.  Uses topical medications occasionally.    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       Objective:     There were no vitals taken for this visit.    Constitutional: Patient is in no acute distress. Appears  well-developed and well-nourished.   Cardiovascular: Normal rate.    Pulmonary/Chest: Effort normal. No respiratory distress.   Neurological: Patient is alert and oriented to person, place, and time.   Skin: Skin is warm and dry.   Psychiatric: Normal mood and affect. Behavior is normal.     Left knee: No gross deformity. Tenderness over the anterior knee, extending laterally.  Full range of motion.  Normal gait.      Assessment/Plan:     1. Contusion of left knee, subsequent encounter  - Referral to Radiology  - MR-KNEE-W/O LEFT; Future      Given duration of symptoms will request MRI Left knee w/o  Diclofenac gel as needed  Heat/ice as needed        Work Status: Full Duty - see D-39 or other state/federal worker's compensation forms for specific restrictions if applicable  Follow up 3 weeks    Differential diagnosis, natural history, supportive care, and indications for immediate follow-up discussed.

## 2025-05-08 NOTE — LETTER
PHYSICIAN’S AND CHIROPRACTIC PHYSICIAN'S   PROGRESS REPORT   CERTIFICATION OF DISABILITY Claim Number:     Social Security Number:    Patient’s Name: Balbina Randall Date of Injury: 3/6/2025   Employer: [employer] Name of [facility] (if applicable):      Patient’s Job Description/Occupation: RN       Previous Injuries/Diseases/Surgeries Contributing to the Condition:         Diagnosis: (S80.02XD) Contusion of left knee, subsequent encounter      Related to the Industrial Injury? Yes     Explain:        Objective Medical Findings: Left knee: No gross deformity. Tenderness over the anterior knee, extending laterally.  Full range of motion.  Normal gait.           None - Discharged                         Stable  No                 Ratable  No        Generally Improved                         Condition Worsened               X   Condition Same  May Have Suffered a Permanent Disability No     Treatment Plan:    Given duration of symptoms will request MRI Left knee w/o  Diclofenac gel as needed  Heat/ice as needed             No Change in Therapy                  PT/OT Prescribed                      Medication May be Used While Working        Case Management                          PT/OT Discontinued    Consultation    Further Diagnostic Studies:    Prescription(s)               X  Released to FULL DUTY /No Restrictions on (Date):  5/8/2025    Certified TOTALLY TEMPORARILY DISABLED (Indicate Dates) From:   To:      Released to RESTRICTED/Modified Duty on (Date): From:   To:    Restrictions Are:         No Sitting    No Standing    No Pulling Other:         No Bending at Waist     No Stooping     No Lifting        No Carrying     No Walking Lifting Restricted to (lbs.):          No Pushing        No Climbing     No Reaching Above Shoulders       Date of Next Visit:  06/05/2025  @8 am  Date of this Exam: 5/8/2025 Physician/Chiropractic Physician Name: Shiv Christensen D.O. Physician/Chiropractic Physician Signature:  Shiv  DO Amparo MPH     [place]:  [address] [place] [address], [address] [place], [place] [address] - Telephone [phone] [place]:  [address]  [facility], [address] [place], [place] [address] - Telephone [phone]    [email or web address]  D-39 (Rev. 10/24)

## 2025-05-14 NOTE — Clinical Note
REFERRAL APPROVAL NOTICE         Sent on May 14, 2025                   Balbina Alisha Russkellie  [address] [place] [address]                   Dear Ms. Randall,    After a careful review of the medical information and benefit coverage, [facility] has processed your referral. See below for additional details.    If applicable, you must be actively enrolled with your insurance for coverage of the authorized service. If you have any questions regarding your coverage, please contact your insurance directly.    REFERRAL INFORMATION   Referral #:  [id number]  Referred-To Department    Referred-By Provider:  Radiology    Shiv Christensen D.O.   Imaging Support [address] [address]  [phone] [address] [place] [address]  [phone]    Referral Start Date:  05/08/2025  Referral End Date:   05/08/2026             SCHEDULING  If you do not already have an appointment, please call [phone] to make an appointment.     MORE INFORMATION  If you do not already have a [employer] account, sign up at: [email or web address]  You can access your medical information, make appointments, see lab results, billing information, and more.  If you have questions regarding this referral, please contact  the [facility] Referrals department at:             [phone]. Monday - Friday 8:00AM - 5:00PM.     Sincerely,    [facility]

## 2025-05-19 ENCOUNTER — HOSPITAL ENCOUNTER (OUTPATIENT)
Dept: RADIOLOGY | Facility: MEDICAL CENTER | Age: 50
End: 2025-05-19
Attending: PREVENTIVE MEDICINE
Payer: COMMERCIAL

## 2025-05-19 DIAGNOSIS — S80.02XD CONTUSION OF LEFT KNEE, SUBSEQUENT ENCOUNTER: ICD-10-CM

## 2025-05-19 PROCEDURE — 73721 MRI JNT OF LWR EXTRE W/O DYE: CPT | Mod: LT

## 2025-05-20 ENCOUNTER — RESULTS FOLLOW-UP (OUTPATIENT)
Dept: OCCUPATIONAL MEDICINE | Facility: CLINIC | Age: 50
End: 2025-05-20

## 2025-06-05 ENCOUNTER — OCCUPATIONAL MEDICINE (OUTPATIENT)
Dept: OCCUPATIONAL MEDICINE | Facility: CLINIC | Age: 50
End: 2025-06-05
Payer: COMMERCIAL

## 2025-06-05 VITALS
SYSTOLIC BLOOD PRESSURE: 130 MMHG | RESPIRATION RATE: 16 BRPM | OXYGEN SATURATION: 97 % | DIASTOLIC BLOOD PRESSURE: 86 MMHG | BODY MASS INDEX: 35.36 KG/M2 | WEIGHT: 220 LBS | HEART RATE: 56 BPM | HEIGHT: 66 IN | TEMPERATURE: 96.5 F

## 2025-06-05 DIAGNOSIS — S80.02XD CONTUSION OF LEFT KNEE, SUBSEQUENT ENCOUNTER: Primary | ICD-10-CM

## 2025-06-05 PROCEDURE — 99213 OFFICE O/P EST LOW 20 MIN: CPT | Performed by: PREVENTIVE MEDICINE

## 2025-06-05 NOTE — PROGRESS NOTES
Subjective:   Balbina Randall is a 50 y.o. female who presents for Follow-Up      Date of Injury: 3/6/2025   Industrial Injury: Yes , Comments: Walking in [place] to drop off specimen in OR lab room.  I slipped and fell  onto L knee.  I was then able to see the water that was on the floor  Previous Injuries/Diseases/Surgeries Contributing to the Condition:      3/27/2025: She was seen in urgent care x 3.  X-rays were negative.  She was advised NSAIDs and work restrictions.  She states initially her workplace was not really following restrictions and she was getting increased pain.  She states the last week and a half or so they have been following her restrictions which has helped.  She states that the knee pain is mostly anterior and lateral.  Pain is more of a burning sensation.  She is sensitive to touch in the area but does not feel a lot of pain with walking, squatting or standing unless it is done for prolonged period of time.  Denies significant prior left knee injuries     4/10/2025: Patient states that overall symptoms are overall improved.  She states that it will walk better.  However she still has sensitivity to touch over the lateral side of the patella.  She states the area is very tender to touch at times.  She sometimes has a hard time applying even lotion because the sensitivity.  She states this area gets occasionally sore if she is standing or walking a lot but for the most part does okay walking.  She feels like she would be able to do her normal job.  Denies any clicking, popping or instability.     5/8/2025: Patient continues to note anterior left knee pain.  Knee pain extends to the lateral side 2.  She states in regards to walking and squatting pain is minimal.  She states is still very tender to touch.  Patient states that she is able to work full duty with only mild discomfort.  Uses topical medications occasionally.    6/5/2025: Patient states that overall symptoms are about the same.   Continues to have lateral sided pain extending from the lateral patella.  She states she is able to walk and squat with minimal to no discomfort.  However the areas very sensitive to touch.  She also notes some discomfort with extremes of varus/valgus stress.  Concerned about duration of symptoms.  Would like to see an orthopedic specialist before closing claim.    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    Constitutional: Patient is in no acute distress. Appears well-developed and well-nourished.   Cardiovascular: Normal rate.    Pulmonary/Chest: Effort normal. No respiratory distress.   Neurological: Patient is alert and oriented to person, place, and time.   Skin: Skin is warm and dry.   Psychiatric: Normal mood and affect. Behavior is normal.     MRI Left Knee: Intact ligaments and menisci. 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 Small joint effusion. Multiloculated synovial cyst tracking along the popliteus muscle.  Left knee: No gross deformity. Tenderness over the anterior knee, extending laterally.  Full range of motion.  Normal gait.       Assessment/Plan:     1. Contusion of left knee, subsequent encounter  - Referral to Orthopedics      Released to Full Duty   Persistent lateral knee pain, no surgical indications on MRI, will refer to orthopedics for consideration of corticosteroid injection and consultation.  Advised patient if no further recommendations from orthopedics case will likely be at [facility]  Diclofenac gel as needed  Heat/ice as needed  Follow-up here 6 weeks if not seen by orthopedics    Differential diagnosis, natural history, supportive care, and indications for immediate follow-up  discussed.      Shiv Christensen D.O.

## 2025-06-05 NOTE — LETTER
PHYSICIAN’S AND CHIROPRACTIC PHYSICIAN'S   PROGRESS REPORT   CERTIFICATION OF DISABILITY Claim Number:     Social Security Number:    Patient’s Name: Balbian Randall Date of Injury: 3/6/2025   Employer: [employer] Name of [place] (if applicable):      Patient’s Job Description/Occupation: RN       Previous Injuries/Diseases/Surgeries Contributing to the Condition:         Diagnosis: (S80.02XD) Contusion of left knee, subsequent encounter  (primary encounter diagnosis)      Related to the Industrial Injury? Yes     Explain:        Objective Medical Findings: MRI Left Knee: Intact ligaments and menisci. 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 Small joint effusion. Multiloculated synovial cyst tracking along the popliteus muscle.  Left knee: No gross deformity. Tenderness over the anterior knee, extending laterally.  Full range of motion.  Normal gait.            None - Discharged                         Stable  No                 Ratable  No        Generally Improved                         Condition Worsened               X   Condition Same  May Have Suffered a Permanent Disability No     Treatment Plan:    Persistent lateral knee pain, no surgical indications on MRI, will refer to orthopedics for consideration of corticosteroid injection and consultation.  Advised patient if no further recommendations from orthopedics case will likely be at [facility]  Diclofenac gel as needed  Heat/ice as needed  Follow-up here 6 weeks if not seen by orthopedics         No Change in Therapy                  PT/OT Prescribed                      Medication May be Used While Working        Case Management                          PT/OT Discontinued    Consultation    Further Diagnostic Studies:    Prescription(s)               X  Released to FULL DUTY /No Restrictions on (Date):  6/5/2025    Certified TOTALLY TEMPORARILY DISABLED (Indicate Dates)  From:   To:      Released to RESTRICTED/Modified Duty on (Date): From:   To:    Restrictions Are:         No Sitting    No Standing    No Pulling Other:         No Bending at Waist     No Stooping     No Lifting        No Carrying     No Walking Lifting Restricted to (lbs.):          No Pushing        No Climbing     No Reaching Above Shoulders       Date of Next Visit:  7/17/2025 @ 8:00 AM Date of this Exam: 6/5/2025 Physician/Chiropractic Physician Name: Shiv Christensen D.O. Physician/Chiropractic Physician Signature:  Shiv Christensen DO MPH     [place]:  [address] [place] [address], [address] [place], [place] [address] - Telephone [phone] [place]:  [address] [place] [address], [address] [place], [place] [address] - Telephone [phone]    [email or web address]  D-39 (Rev. 10/24)

## 2025-06-11 NOTE — Clinical Note
REFERRAL APPROVAL NOTICE         Sent on June 11, 2025                   Balbina Alishaheath Randall  [address] [place] [address]                   Dear MsKeith Russkellie,    After a careful review of the medical information and benefit coverage, [facility] has processed your referral. See below for additional details.    If applicable, you must be actively enrolled with your insurance for coverage of the authorized service. If you have any questions regarding your coverage, please contact your insurance directly.    REFERRAL INFORMATION   Referral #:  [id number]  Referred-To Department    Referred-By Provider:  Orthopedics    Shiv Christensen D.O.   [facility] (joint)      [address] [address]  [phone] [address] [place] [address]  [phone]    Referral Start Date:  06/05/2025  Referral End Date:   06/05/2026             SCHEDULING  If you do not already have an appointment, please call [phone] to make an appointment.     MORE INFORMATION  If you do not already have a [employer] account, sign up at: [email or web address]  You can access your medical information, make appointments, see lab results, billing information, and more.  If you have questions regarding this referral, please contact  the [facility] Referrals department at:             [phone]. Monday - Friday 8:00AM - 5:00PM.     Sincerely,    [facility]

## 2025-07-16 ENCOUNTER — TELEPHONE (OUTPATIENT)
Dept: OCCUPATIONAL MEDICINE | Facility: CLINIC | Age: 50
End: 2025-07-16
Payer: COMMERCIAL

## 2025-07-17 ENCOUNTER — OCCUPATIONAL MEDICINE (OUTPATIENT)
Dept: OCCUPATIONAL MEDICINE | Facility: CLINIC | Age: 50
End: 2025-07-17
Payer: COMMERCIAL

## 2025-07-17 VITALS
HEIGHT: 66 IN | RESPIRATION RATE: 14 BRPM | TEMPERATURE: 96.4 F | DIASTOLIC BLOOD PRESSURE: 86 MMHG | SYSTOLIC BLOOD PRESSURE: 128 MMHG | OXYGEN SATURATION: 99 % | HEART RATE: 60 BPM | BODY MASS INDEX: 35.2 KG/M2 | WEIGHT: 219 LBS

## 2025-07-17 DIAGNOSIS — S80.02XD CONTUSION OF LEFT KNEE, SUBSEQUENT ENCOUNTER: Primary | ICD-10-CM

## 2025-07-17 PROCEDURE — 99213 OFFICE O/P EST LOW 20 MIN: CPT | Performed by: PREVENTIVE MEDICINE

## 2025-07-17 ASSESSMENT — PAIN SCALES - GENERAL: PAINLEVEL_OUTOF10: NO PAIN

## 2025-07-17 NOTE — LETTER
PHYSICIAN’S AND CHIROPRACTIC PHYSICIAN'S   PROGRESS REPORT   CERTIFICATION OF DISABILITY Claim Number:     Social Security Number:    Patient’s Name: Balbina Randall Date of Injury: 3/6/2025   Employer:   [employer] Name of [facility] [employer] [facility] [place] (if applicable):      Patient’s Job Description/Occupation: RN       Previous Injuries/Diseases/Surgeries Contributing to the Condition:         Diagnosis: (S80.02XD) Contusion of left knee, subsequent encounter  (primary encounter diagnosis)      Related to the Industrial Injury? Yes     Explain:        Objective Medical Findings: MRI Left Knee: Intact ligaments and menisci. 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 Small joint effusion. Multiloculated synovial cyst tracking along the popliteus muscle.  Left knee: No gross deformity. Area of pain over the right knee.  Full range of motion.  Normal gait.            None - Discharged                         Stable  No                 Ratable  No     X   Generally Improved                         Condition Worsened                  Condition Same  May Have Suffered a Permanent Disability No     Treatment Plan:    Has noted gradual improvement in symptoms over the last month.  Referral to orthopedics, was approved for the [facility] however she is having difficult time getting an appointment, we will resend referral to another clinic  Diclofenac gel as needed  Heat/ice as needed  Follow-up here 6 weeks if not seen by orthopedics         No Change in Therapy                  PT/OT Prescribed                      Medication May be Used While Working        Case Management                          PT/OT Discontinued    Consultation    Further Diagnostic Studies:    Prescription(s)               X  Released to FULL DUTY /No Restrictions on (Date):  7/17/2025    Certified TOTALLY TEMPORARILY DISABLED (Indicate Dates) From:   To:      Released to  RESTRICTED/Modified Duty on (Date): From:   To:    Restrictions Are:         No Sitting    No Standing    No Pulling Other:         No Bending at Waist     No Stooping     No Lifting        No Carrying     No Walking Lifting Restricted to (lbs.):          No Pushing        No Climbing     No Reaching Above Shoulders       Date of Next Visit:  08/28/2025  @8 am Date of this Exam: 7/17/2025 Physician/Chiropractic Physician Name: Shiv Christensen D.O. Physician/Chiropractic Physician Signature:  Shiv Christensen DO MPH     [place]:  [address] [place] [address], [address] [place], [place] [address] - Telephone [phone] [place]:  [address] [place] [address], [address] [place], [place] [address] - Telephone [phone]    [email or web address]  D-39 (Rev. 10/24)

## 2025-07-18 NOTE — Clinical Note
REFERRAL APPROVAL NOTICE         Sent on July 18, 2025                   Balbina Alishaheath Randall  [address] [place] [address]                   Dear MsKeith Russkellie,    After a careful review of the medical information and benefit coverage, [facility] has processed your referral. See below for additional details.    If applicable, you must be actively enrolled with your insurance for coverage of the authorized service. If you have any questions regarding your coverage, please contact your insurance directly.    REFERRAL INFORMATION   Referral #:  [id number]  Referred-To Provider    Referred-By Provider:  Orthopedic Surgery    Shiv Christensen D.O.   [facility]      [address]  [place] [address]  [phone] [id number]  [id number]  [place] [address]  [phone]    Referral Start Date:  07/17/2025  Referral End Date:   07/17/2026             SCHEDULING  If you do not already have an appointment, please call [phone] to make an appointment.     MORE INFORMATION  If you do not already have a [employer] account, sign up at: [email or web address]  You can access your medical information, make appointments, see lab results, billing information, and more.  If you have questions regarding this referral, please contact  the [facility] Referrals department at:             [phone]. Monday - Friday 8:00AM - 5:00PM.     Sincerely,    [facility]

## (undated) DEVICE — GOWN WARMING STANDARD FLEX - (30/CA)

## (undated) DEVICE — SENSOR SPO2 NEO LNCS ADHESIVE (20/BX) SEE USER NOTES

## (undated) DEVICE — NEPTUNE 4 PORT MANIFOLD - (20/PK)

## (undated) DEVICE — SET LEADWIRE 5 LEAD BEDSIDE DISPOSABLE ECG (1SET OF 5/EA)

## (undated) DEVICE — LACTATED RINGERS INJ 1000 ML - (14EA/CA 60CA/PF)

## (undated) DEVICE — CANISTER SUCTION RIGID RED 1500CC (40EA/CA)

## (undated) DEVICE — PROTECTOR ULNA NERVE - (36PR/CA)

## (undated) DEVICE — MASK, LARYNGEAL AIRWAY #4

## (undated) DEVICE — TRAY SRGPRP PVP IOD WT PRP - (20/CA)

## (undated) DEVICE — SUCTION INSTRUMENT YANKAUER BULBOUS TIP W/O VENT (50EA/CA)

## (undated) DEVICE — HEAD HOLDER JUNIOR/ADULT

## (undated) DEVICE — HUMID-VENT HEAT AND MOISTURE EXCHANGE- (50/BX)

## (undated) DEVICE — ARTHROWAND TURBOVAC 3.5/90 SCT

## (undated) DEVICE — ADVANCED NOVASURE STERILE DEVICE (3EA/PK)

## (undated) DEVICE — NEEDLE W/FACET TIP DULL VERSION W/STIMULATION CABLE SONOPLEX 21G X 4 (10/EA)"

## (undated) DEVICE — KIT ANESTHESIA W/CIRCUIT & 3/LT BAG W/FILTER (20EA/CA)

## (undated) DEVICE — SODIUM CHL. IRRIGATION 0.9% 3000ML (4EA/CA 65CA/PF)

## (undated) DEVICE — CANISTER SUCTION 3000ML MECHANICAL FILTER AUTO SHUTOFF MEDI-VAC NONSTERILE LF DISP  (40EA/CA)

## (undated) DEVICE — NEEDLE EXPRESSEW III (5EA/PK)

## (undated) DEVICE — SYRINGE 30 ML LL (56/BX)

## (undated) DEVICE — TUBING CLEARLINK DUO-VENT - C-FLO (48EA/CA)

## (undated) DEVICE — CANNULA GEMINI PASSPORT 20MM - (5/BX)

## (undated) DEVICE — NEEDLE SPINAL NON-SAFETY 22 GA X 3 (25EA/BX)"

## (undated) DEVICE — DRAPETIBURON SHOULDER W/POUCH - (5EA/CA)

## (undated) DEVICE — ELECTRODE DUAL RETURN W/ CORD - (50/PK)

## (undated) DEVICE — TUBE CONNECTING SUCTION - CLEAR PLASTIC STERILE 72 IN (50EA/CA)

## (undated) DEVICE — NEEDLE SAFETY 18 GA X 1 1/2 IN (100EA/BX)

## (undated) DEVICE — ELECTRODE 850 FOAM ADHESIVE - HYDROGEL RADIOTRNSPRNT (50/PK)

## (undated) DEVICE — CHLORAPREP 26 ML APPLICATOR - ORANGE TINT(25/CA)

## (undated) DEVICE — GLOVE BIOGEL SZ 8 SURGICAL PF LTX - (50PR/BX 4BX/CA)

## (undated) DEVICE — GLOVE BIOGEL INDICATOR SZ 8 SURGICAL PF LTX - (50/BX 4BX/CA)

## (undated) DEVICE — LEAD SET 6 DISP. EKG NIHON KOHDEN (100EA/CA)

## (undated) DEVICE — DRAPE UNDER BUTTOCKS FLUID - (20/CA)

## (undated) DEVICE — SPIDER SHOULDER HOLDER (12EA/BX)

## (undated) DEVICE — GLOVE BIOGEL INDICATOR SZ 7SURGICAL PF LTX - (50/BX 4BX/CA)

## (undated) DEVICE — GOWN SURGICAL X-LARGE ULTRA - FILM-REINFORCED (20/CA)

## (undated) DEVICE — PACK SHOULDER ARTHROSCOPY SM - (2EA/CA)

## (undated) DEVICE — KIT ROOM DECONTAMINATION

## (undated) DEVICE — MASK ANESTHESIA ADULT  - (100/CA)

## (undated) DEVICE — SUTURE 3-0 PROLENE PS-1 (12PK/BX)

## (undated) DEVICE — GLOVE, LITE (PAIR)

## (undated) DEVICE — TUBE E-T HI-LO CUFF 7.0MM (10EA/PK)

## (undated) DEVICE — SUTURE PLASTIC

## (undated) DEVICE — SHAVER, 5.5 RESECTOR

## (undated) DEVICE — SHAVER4.0 AGGRESSIVE + FORMLA (5EA/BX)

## (undated) DEVICE — KIT  I.V. START (100EA/CA)

## (undated) DEVICE — Device

## (undated) DEVICE — SPONGE GAUZESTER 4 X 4 4PLY - (128PK/CA)

## (undated) DEVICE — GLOVE BIOGEL PI INDICATOR SZ 6.5 SURGICAL PF LF - (50/BX 4BX/CA)

## (undated) DEVICE — GLOVE BIOGEL OTRHO SZ 7 SURGICAL PF LTX - (4BX/CA)